# Patient Record
Sex: MALE | Race: WHITE | NOT HISPANIC OR LATINO | Employment: OTHER | URBAN - METROPOLITAN AREA
[De-identification: names, ages, dates, MRNs, and addresses within clinical notes are randomized per-mention and may not be internally consistent; named-entity substitution may affect disease eponyms.]

---

## 2017-02-08 ENCOUNTER — COMMUNICATION - HEALTHEAST (OUTPATIENT)
Dept: FAMILY MEDICINE | Facility: CLINIC | Age: 67
End: 2017-02-08

## 2017-02-08 DIAGNOSIS — F52.21 ERECTILE DISORDER, GENERALIZED, MILD: ICD-10-CM

## 2017-04-24 ENCOUNTER — AMBULATORY - HEALTHEAST (OUTPATIENT)
Dept: FAMILY MEDICINE | Facility: CLINIC | Age: 67
End: 2017-04-24

## 2017-04-24 ENCOUNTER — AMBULATORY - HEALTHEAST (OUTPATIENT)
Dept: NEUROSURGERY | Facility: CLINIC | Age: 67
End: 2017-04-24

## 2017-04-24 ENCOUNTER — OFFICE VISIT - HEALTHEAST (OUTPATIENT)
Dept: FAMILY MEDICINE | Facility: CLINIC | Age: 67
End: 2017-04-24

## 2017-04-24 DIAGNOSIS — N52.9 ED (ERECTILE DYSFUNCTION): ICD-10-CM

## 2017-04-24 DIAGNOSIS — R73.09 ABNORMAL BLOOD SUGAR: ICD-10-CM

## 2017-04-24 DIAGNOSIS — M54.30 SCIATICA: ICD-10-CM

## 2017-04-24 DIAGNOSIS — N40.0 PROSTATISM: ICD-10-CM

## 2017-04-24 DIAGNOSIS — M54.10 RADICULOPATHY: ICD-10-CM

## 2017-04-24 DIAGNOSIS — Z00.00 ROUTINE GENERAL MEDICAL EXAMINATION AT A HEALTH CARE FACILITY: ICD-10-CM

## 2017-04-24 DIAGNOSIS — E78.00 HYPERCHOLESTEREMIA: ICD-10-CM

## 2017-04-24 DIAGNOSIS — M54.9 BACK PAIN: ICD-10-CM

## 2017-04-24 LAB
ATRIAL RATE - MUSE: 71 BPM
CHOLEST SERPL-MCNC: 157 MG/DL
DIASTOLIC BLOOD PRESSURE - MUSE: NORMAL MMHG
FASTING STATUS PATIENT QL REPORTED: YES
HBA1C MFR BLD: 5.9 % (ref 3.5–6)
HDLC SERPL-MCNC: 53 MG/DL
INTERPRETATION ECG - MUSE: NORMAL
LDLC SERPL CALC-MCNC: 84 MG/DL
P AXIS - MUSE: 16 DEGREES
PR INTERVAL - MUSE: 162 MS
PSA SERPL-MCNC: 1.1 NG/ML (ref 0–4.5)
QRS DURATION - MUSE: 84 MS
QT - MUSE: 376 MS
QTC - MUSE: 408 MS
R AXIS - MUSE: 30 DEGREES
SYSTOLIC BLOOD PRESSURE - MUSE: NORMAL MMHG
T AXIS - MUSE: 0 DEGREES
TRIGL SERPL-MCNC: 98 MG/DL
VENTRICULAR RATE- MUSE: 71 BPM

## 2017-04-24 RX ORDER — CHLORAL HYDRATE 500 MG
2 CAPSULE ORAL DAILY
Status: SHIPPED | COMMUNITY
Start: 2017-04-24 | End: 2024-08-27

## 2017-04-24 ASSESSMENT — MIFFLIN-ST. JEOR: SCORE: 1513.76

## 2017-04-28 ENCOUNTER — HOSPITAL ENCOUNTER (OUTPATIENT)
Dept: RADIOLOGY | Facility: HOSPITAL | Age: 67
Discharge: HOME | End: 2017-04-28
Attending: SURGERY

## 2017-04-28 ENCOUNTER — HOSPITAL ENCOUNTER (OUTPATIENT)
Dept: MRI IMAGING | Facility: HOSPITAL | Age: 67
Discharge: HOME | End: 2017-04-28
Attending: SURGERY

## 2017-04-28 DIAGNOSIS — M54.9 BACK PAIN: ICD-10-CM

## 2017-05-04 ENCOUNTER — AMBULATORY - HEALTHEAST (OUTPATIENT)
Dept: NEUROSURGERY | Facility: CLINIC | Age: 67
End: 2017-05-04

## 2017-05-04 ENCOUNTER — OFFICE VISIT - HEALTHEAST (OUTPATIENT)
Dept: NEUROSURGERY | Facility: CLINIC | Age: 67
End: 2017-05-04

## 2017-05-04 ENCOUNTER — COMMUNICATION - HEALTHEAST (OUTPATIENT)
Dept: NEUROSURGERY | Facility: CLINIC | Age: 67
End: 2017-05-04

## 2017-05-04 DIAGNOSIS — M54.9 BACK PAIN: ICD-10-CM

## 2017-05-04 DIAGNOSIS — E78.00 HIGH CHOLESTEROL: ICD-10-CM

## 2017-05-04 ASSESSMENT — MIFFLIN-ST. JEOR: SCORE: 1477.47

## 2017-05-14 ENCOUNTER — COMMUNICATION - HEALTHEAST (OUTPATIENT)
Dept: FAMILY MEDICINE | Facility: CLINIC | Age: 67
End: 2017-05-14

## 2017-05-14 DIAGNOSIS — E78.00 HYPERCHOLESTEREMIA: ICD-10-CM

## 2017-11-12 ENCOUNTER — COMMUNICATION - HEALTHEAST (OUTPATIENT)
Dept: FAMILY MEDICINE | Facility: CLINIC | Age: 67
End: 2017-11-12

## 2017-11-12 DIAGNOSIS — E78.00 HYPERCHOLESTEREMIA: ICD-10-CM

## 2017-11-16 ENCOUNTER — OFFICE VISIT - HEALTHEAST (OUTPATIENT)
Dept: FAMILY MEDICINE | Facility: CLINIC | Age: 67
End: 2017-11-16

## 2017-11-16 DIAGNOSIS — Z00.00 HEALTH CARE MAINTENANCE: ICD-10-CM

## 2017-11-16 DIAGNOSIS — E78.00 HYPERCHOLESTEREMIA: ICD-10-CM

## 2017-11-16 DIAGNOSIS — Z23 NEED FOR PNEUMOCOCCAL VACCINE: ICD-10-CM

## 2017-11-16 DIAGNOSIS — C43.59 MALIGNANT MELANOMA OF TORSO EXCLUDING BREAST (H): ICD-10-CM

## 2017-11-16 DIAGNOSIS — Z01.818 PREOP EXAMINATION: ICD-10-CM

## 2017-11-16 LAB
ATRIAL RATE - MUSE: 70 BPM
CHOLEST SERPL-MCNC: 157 MG/DL
DIASTOLIC BLOOD PRESSURE - MUSE: NORMAL MMHG
FASTING STATUS PATIENT QL REPORTED: YES
HDLC SERPL-MCNC: 54 MG/DL
INTERPRETATION ECG - MUSE: NORMAL
LDLC SERPL CALC-MCNC: 84 MG/DL
P AXIS - MUSE: 10 DEGREES
PR INTERVAL - MUSE: 156 MS
QRS DURATION - MUSE: 84 MS
QT - MUSE: 374 MS
QTC - MUSE: 403 MS
R AXIS - MUSE: 25 DEGREES
SYSTOLIC BLOOD PRESSURE - MUSE: NORMAL MMHG
T AXIS - MUSE: -5 DEGREES
TRIGL SERPL-MCNC: 95 MG/DL
VENTRICULAR RATE- MUSE: 70 BPM

## 2017-11-16 ASSESSMENT — MIFFLIN-ST. JEOR: SCORE: 1504.69

## 2018-05-23 ENCOUNTER — OFFICE VISIT - HEALTHEAST (OUTPATIENT)
Dept: FAMILY MEDICINE | Facility: CLINIC | Age: 68
End: 2018-05-23

## 2018-05-23 DIAGNOSIS — N40.0 PROSTATISM: ICD-10-CM

## 2018-05-23 DIAGNOSIS — R06.02 SOB (SHORTNESS OF BREATH): ICD-10-CM

## 2018-05-23 DIAGNOSIS — E78.00 HYPERCHOLESTEREMIA: ICD-10-CM

## 2018-05-23 DIAGNOSIS — Z00.00 MEDICARE ANNUAL WELLNESS VISIT, INITIAL: ICD-10-CM

## 2018-05-23 DIAGNOSIS — N52.9 ED (ERECTILE DYSFUNCTION): ICD-10-CM

## 2018-05-23 LAB
ALBUMIN SERPL-MCNC: 4.6 G/DL (ref 3.5–5)
ALBUMIN UR-MCNC: NEGATIVE MG/DL
ALP SERPL-CCNC: 80 U/L (ref 45–120)
ALT SERPL W P-5'-P-CCNC: 26 U/L (ref 0–45)
ANION GAP SERPL CALCULATED.3IONS-SCNC: 11 MMOL/L (ref 5–18)
APPEARANCE UR: CLEAR
AST SERPL W P-5'-P-CCNC: 24 U/L (ref 0–40)
ATRIAL RATE - MUSE: 79 BPM
BASOPHILS # BLD AUTO: 0 THOU/UL (ref 0–0.2)
BASOPHILS NFR BLD AUTO: 1 % (ref 0–2)
BILIRUB SERPL-MCNC: 0.7 MG/DL (ref 0–1)
BILIRUB UR QL STRIP: NEGATIVE
BUN SERPL-MCNC: 22 MG/DL (ref 8–22)
CALCIUM SERPL-MCNC: 10.2 MG/DL (ref 8.5–10.5)
CHLORIDE BLD-SCNC: 105 MMOL/L (ref 98–107)
CHOLEST SERPL-MCNC: 137 MG/DL
CO2 SERPL-SCNC: 25 MMOL/L (ref 22–31)
COLOR UR AUTO: YELLOW
CREAT SERPL-MCNC: 1.23 MG/DL (ref 0.7–1.3)
DIASTOLIC BLOOD PRESSURE - MUSE: NORMAL MMHG
EOSINOPHIL # BLD AUTO: 0.3 THOU/UL (ref 0–0.4)
EOSINOPHIL NFR BLD AUTO: 3 % (ref 0–6)
ERYTHROCYTE [DISTWIDTH] IN BLOOD BY AUTOMATED COUNT: 12.7 % (ref 11–14.5)
FASTING STATUS PATIENT QL REPORTED: YES
GFR SERPL CREATININE-BSD FRML MDRD: 58 ML/MIN/1.73M2
GLUCOSE BLD-MCNC: 103 MG/DL (ref 70–125)
GLUCOSE UR STRIP-MCNC: NEGATIVE MG/DL
HBA1C MFR BLD: 5.6 % (ref 3.5–6)
HCT VFR BLD AUTO: 43.2 % (ref 40–54)
HDLC SERPL-MCNC: 49 MG/DL
HGB BLD-MCNC: 14.7 G/DL (ref 14–18)
HGB UR QL STRIP: NEGATIVE
INTERPRETATION ECG - MUSE: NORMAL
KETONES UR STRIP-MCNC: NEGATIVE MG/DL
LDLC SERPL CALC-MCNC: 75 MG/DL
LEUKOCYTE ESTERASE UR QL STRIP: NEGATIVE
LYMPHOCYTES # BLD AUTO: 1.6 THOU/UL (ref 0.8–4.4)
LYMPHOCYTES NFR BLD AUTO: 22 % (ref 20–40)
MCH RBC QN AUTO: 32.1 PG (ref 27–34)
MCHC RBC AUTO-ENTMCNC: 33.9 G/DL (ref 32–36)
MCV RBC AUTO: 95 FL (ref 80–100)
MONOCYTES # BLD AUTO: 0.7 THOU/UL (ref 0–0.9)
MONOCYTES NFR BLD AUTO: 9 % (ref 2–10)
NEUTROPHILS # BLD AUTO: 4.8 THOU/UL (ref 2–7.7)
NEUTROPHILS NFR BLD AUTO: 65 % (ref 50–70)
NITRATE UR QL: NEGATIVE
P AXIS - MUSE: 11 DEGREES
PH UR STRIP: 5.5 [PH] (ref 5–8)
PLATELET # BLD AUTO: 203 THOU/UL (ref 140–440)
PMV BLD AUTO: 9.5 FL (ref 7–10)
POTASSIUM BLD-SCNC: 4.4 MMOL/L (ref 3.5–5)
PR INTERVAL - MUSE: 150 MS
PROT SERPL-MCNC: 7.3 G/DL (ref 6–8)
PSA SERPL-MCNC: 1.4 NG/ML (ref 0–4.5)
QRS DURATION - MUSE: 86 MS
QT - MUSE: 382 MS
QTC - MUSE: 438 MS
R AXIS - MUSE: 48 DEGREES
RBC # BLD AUTO: 4.56 MILL/UL (ref 4.4–6.2)
SODIUM SERPL-SCNC: 141 MMOL/L (ref 136–145)
SP GR UR STRIP: 1.02 (ref 1–1.03)
SYSTOLIC BLOOD PRESSURE - MUSE: NORMAL MMHG
T AXIS - MUSE: 9 DEGREES
TRIGL SERPL-MCNC: 64 MG/DL
UROBILINOGEN UR STRIP-ACNC: NORMAL
VENTRICULAR RATE- MUSE: 79 BPM
WBC: 7.3 THOU/UL (ref 4–11)

## 2018-05-23 ASSESSMENT — MIFFLIN-ST. JEOR: SCORE: 1503.56

## 2018-05-24 ENCOUNTER — COMMUNICATION - HEALTHEAST (OUTPATIENT)
Dept: FAMILY MEDICINE | Facility: CLINIC | Age: 68
End: 2018-05-24

## 2018-05-31 ENCOUNTER — HOSPITAL ENCOUNTER (OUTPATIENT)
Dept: CARDIOLOGY | Facility: HOSPITAL | Age: 68
Discharge: HOME | End: 2018-05-31
Attending: FAMILY MEDICINE

## 2018-05-31 DIAGNOSIS — R06.02 SOB (SHORTNESS OF BREATH): ICD-10-CM

## 2018-05-31 LAB
CV STRESS CURRENT BP HE: NORMAL
CV STRESS CURRENT HR HE: 101
CV STRESS CURRENT HR HE: 104
CV STRESS CURRENT HR HE: 106
CV STRESS CURRENT HR HE: 108
CV STRESS CURRENT HR HE: 109
CV STRESS CURRENT HR HE: 110
CV STRESS CURRENT HR HE: 110
CV STRESS CURRENT HR HE: 116
CV STRESS CURRENT HR HE: 122
CV STRESS CURRENT HR HE: 124
CV STRESS CURRENT HR HE: 128
CV STRESS CURRENT HR HE: 130
CV STRESS CURRENT HR HE: 130
CV STRESS CURRENT HR HE: 132
CV STRESS CURRENT HR HE: 142
CV STRESS CURRENT HR HE: 142
CV STRESS CURRENT HR HE: 143
CV STRESS CURRENT HR HE: 150
CV STRESS CURRENT HR HE: 151
CV STRESS CURRENT HR HE: 151
CV STRESS CURRENT HR HE: 152
CV STRESS CURRENT HR HE: 79
CV STRESS CURRENT HR HE: 81
CV STRESS CURRENT HR HE: 93
CV STRESS CURRENT HR HE: 94
CV STRESS CURRENT HR HE: 95
CV STRESS CURRENT HR HE: 96
CV STRESS CURRENT HR HE: 97
CV STRESS CURRENT HR HE: 97
CV STRESS CURRENT HR HE: 98
CV STRESS CURRENT HR HE: 98
CV STRESS CURRENT HR HE: 99
CV STRESS DEVIATION TIME HE: NORMAL
CV STRESS ECHO PERCENT HR HE: NORMAL
CV STRESS EXERCISE STAGE HE: NORMAL
CV STRESS FINAL RESTING BP HE: NORMAL
CV STRESS FINAL RESTING HR HE: 101
CV STRESS MAX HR HE: 152
CV STRESS MAX TREADMILL GRADE HE: 16
CV STRESS MAX TREADMILL SPEED HE: 4.2
CV STRESS PEAK DIA BP HE: NORMAL
CV STRESS PEAK SYS BP HE: NORMAL
CV STRESS PHASE HE: NORMAL
CV STRESS PROTOCOL HE: NORMAL
CV STRESS RESTING PT POSITION HE: NORMAL
CV STRESS RESTING PT POSITION HE: NORMAL
CV STRESS ST DEVIATION AMOUNT HE: NORMAL
CV STRESS ST DEVIATION ELEVATION HE: NORMAL
CV STRESS ST EVELATION AMOUNT HE: NORMAL
CV STRESS TEST TYPE HE: NORMAL
CV STRESS TOTAL STAGE TIME MIN 1 HE: NORMAL
ECHO EJECTION FRACTION ESTIMATED: 60 %
STRESS ECHO BASELINE BP: NORMAL
STRESS ECHO BASELINE HR: 77
STRESS ECHO CALCULATED PERCENT HR: 101 %
STRESS ECHO LAST STRESS BP: NORMAL
STRESS ECHO LAST STRESS HR: 152
STRESS ECHO POST ESTIMATED WORKLOAD: 12.1
STRESS ECHO POST EXERCISE DUR MIN: 11
STRESS ECHO POST EXERCISE DUR SEC: 20
STRESS ECHO TARGET HR: 128

## 2018-06-04 ENCOUNTER — COMMUNICATION - HEALTHEAST (OUTPATIENT)
Dept: FAMILY MEDICINE | Facility: CLINIC | Age: 68
End: 2018-06-04

## 2018-06-04 ENCOUNTER — OFFICE VISIT - HEALTHEAST (OUTPATIENT)
Dept: FAMILY MEDICINE | Facility: CLINIC | Age: 68
End: 2018-06-04

## 2018-06-04 DIAGNOSIS — R07.89 ATYPICAL CHEST PAIN: ICD-10-CM

## 2018-06-04 ASSESSMENT — MIFFLIN-ST. JEOR: SCORE: 1492.67

## 2018-07-11 ENCOUNTER — OFFICE VISIT - HEALTHEAST (OUTPATIENT)
Dept: FAMILY MEDICINE | Facility: CLINIC | Age: 68
End: 2018-07-11

## 2018-07-11 DIAGNOSIS — F52.21 ERECTILE DISORDER, GENERALIZED, MILD: ICD-10-CM

## 2018-07-11 DIAGNOSIS — E78.00 HYPERCHOLESTEREMIA: ICD-10-CM

## 2018-07-11 DIAGNOSIS — I25.10 CAD (CORONARY ARTERY DISEASE): ICD-10-CM

## 2018-07-11 ASSESSMENT — MIFFLIN-ST. JEOR: SCORE: 1500.38

## 2019-01-28 ENCOUNTER — COMMUNICATION - HEALTHEAST (OUTPATIENT)
Dept: FAMILY MEDICINE | Facility: CLINIC | Age: 69
End: 2019-01-28

## 2019-01-28 DIAGNOSIS — F52.21 ERECTILE DISORDER, GENERALIZED, MILD: ICD-10-CM

## 2019-05-13 ENCOUNTER — COMMUNICATION - HEALTHEAST (OUTPATIENT)
Dept: FAMILY MEDICINE | Facility: CLINIC | Age: 69
End: 2019-05-13

## 2019-05-13 DIAGNOSIS — E78.00 HYPERCHOLESTEREMIA: ICD-10-CM

## 2019-05-13 RX ORDER — UBIDECARENONE 200 MG
200 CAPSULE ORAL DAILY
Qty: 90 CAPSULE | Refills: 3 | Status: SHIPPED | OUTPATIENT
Start: 2019-05-13

## 2019-05-22 ENCOUNTER — COMMUNICATION - HEALTHEAST (OUTPATIENT)
Dept: FAMILY MEDICINE | Facility: CLINIC | Age: 69
End: 2019-05-22

## 2019-05-22 DIAGNOSIS — F52.21 ERECTILE DISORDER, GENERALIZED, MILD: ICD-10-CM

## 2019-07-02 ENCOUNTER — RECORDS - HEALTHEAST (OUTPATIENT)
Dept: ADMINISTRATIVE | Facility: OTHER | Age: 69
End: 2019-07-02

## 2019-08-28 ENCOUNTER — COMMUNICATION - HEALTHEAST (OUTPATIENT)
Dept: FAMILY MEDICINE | Facility: CLINIC | Age: 69
End: 2019-08-28

## 2019-08-28 DIAGNOSIS — F52.21 ERECTILE DISORDER, GENERALIZED, MILD: ICD-10-CM

## 2019-08-29 ENCOUNTER — COMMUNICATION - HEALTHEAST (OUTPATIENT)
Dept: FAMILY MEDICINE | Facility: CLINIC | Age: 69
End: 2019-08-29

## 2019-08-29 DIAGNOSIS — F52.21 ERECTILE DISORDER, GENERALIZED, MILD: ICD-10-CM

## 2019-09-25 ENCOUNTER — OFFICE VISIT - HEALTHEAST (OUTPATIENT)
Dept: FAMILY MEDICINE | Facility: CLINIC | Age: 69
End: 2019-09-25

## 2019-09-25 DIAGNOSIS — N40.0 PROSTATISM: ICD-10-CM

## 2019-09-25 DIAGNOSIS — Z12.5 ENCOUNTER FOR SCREENING FOR MALIGNANT NEOPLASM OF PROSTATE: ICD-10-CM

## 2019-09-25 DIAGNOSIS — C43.59 MALIGNANT MELANOMA OF TORSO EXCLUDING BREAST (H): ICD-10-CM

## 2019-09-25 DIAGNOSIS — E78.00 HYPERCHOLESTEREMIA: ICD-10-CM

## 2019-09-25 DIAGNOSIS — R73.01 IMPAIRED FASTING BLOOD SUGAR: ICD-10-CM

## 2019-09-25 DIAGNOSIS — Z00.00 MEDICARE ANNUAL WELLNESS VISIT, SUBSEQUENT: ICD-10-CM

## 2019-09-25 LAB
ALBUMIN SERPL-MCNC: 4.6 G/DL (ref 3.5–5)
ALBUMIN UR-MCNC: NEGATIVE MG/DL
ALP SERPL-CCNC: 78 U/L (ref 45–120)
ALT SERPL W P-5'-P-CCNC: 30 U/L (ref 0–45)
ANION GAP SERPL CALCULATED.3IONS-SCNC: 9 MMOL/L (ref 5–18)
APPEARANCE UR: CLEAR
AST SERPL W P-5'-P-CCNC: 25 U/L (ref 0–40)
BILIRUB SERPL-MCNC: 0.8 MG/DL (ref 0–1)
BILIRUB UR QL STRIP: NEGATIVE
BUN SERPL-MCNC: 19 MG/DL (ref 8–28)
CALCIUM SERPL-MCNC: 9.8 MG/DL (ref 8.5–10.5)
CHLORIDE BLD-SCNC: 104 MMOL/L (ref 98–107)
CHOLEST SERPL-MCNC: 151 MG/DL
CO2 SERPL-SCNC: 28 MMOL/L (ref 22–31)
COLOR UR AUTO: YELLOW
CREAT SERPL-MCNC: 1.24 MG/DL (ref 0.7–1.3)
ERYTHROCYTE [DISTWIDTH] IN BLOOD BY AUTOMATED COUNT: 12.8 % (ref 11–14.5)
FASTING STATUS PATIENT QL REPORTED: YES
GFR SERPL CREATININE-BSD FRML MDRD: 58 ML/MIN/1.73M2
GLUCOSE BLD-MCNC: 93 MG/DL (ref 70–125)
GLUCOSE UR STRIP-MCNC: NEGATIVE MG/DL
HBA1C MFR BLD: 5.8 % (ref 3.5–6)
HCT VFR BLD AUTO: 42.2 % (ref 40–54)
HDLC SERPL-MCNC: 58 MG/DL
HGB BLD-MCNC: 14.4 G/DL (ref 14–18)
HGB UR QL STRIP: NEGATIVE
KETONES UR STRIP-MCNC: NEGATIVE MG/DL
LDLC SERPL CALC-MCNC: 76 MG/DL
LEUKOCYTE ESTERASE UR QL STRIP: NEGATIVE
MCH RBC QN AUTO: 31.7 PG (ref 27–34)
MCHC RBC AUTO-ENTMCNC: 34.2 G/DL (ref 32–36)
MCV RBC AUTO: 93 FL (ref 80–100)
NITRATE UR QL: NEGATIVE
PH UR STRIP: 5.5 [PH] (ref 5–8)
PLATELET # BLD AUTO: 174 THOU/UL (ref 140–440)
PMV BLD AUTO: 9.5 FL (ref 7–10)
POTASSIUM BLD-SCNC: 4.1 MMOL/L (ref 3.5–5)
PROT SERPL-MCNC: 7.3 G/DL (ref 6–8)
PSA SERPL-MCNC: 0.8 NG/ML (ref 0–6.5)
RBC # BLD AUTO: 4.55 MILL/UL (ref 4.4–6.2)
SODIUM SERPL-SCNC: 141 MMOL/L (ref 136–145)
SP GR UR STRIP: 1.02 (ref 1–1.03)
TRIGL SERPL-MCNC: 86 MG/DL
UROBILINOGEN UR STRIP-ACNC: NORMAL
WBC: 6.9 THOU/UL (ref 4–11)

## 2019-09-25 ASSESSMENT — MIFFLIN-ST. JEOR: SCORE: 1457.75

## 2019-09-25 ASSESSMENT — ANXIETY QUESTIONNAIRES
2. NOT BEING ABLE TO STOP OR CONTROL WORRYING: NOT AT ALL
1. FEELING NERVOUS, ANXIOUS, OR ON EDGE: NOT AT ALL

## 2019-09-26 ENCOUNTER — COMMUNICATION - HEALTHEAST (OUTPATIENT)
Dept: FAMILY MEDICINE | Facility: CLINIC | Age: 69
End: 2019-09-26

## 2019-09-26 LAB
ATRIAL RATE - MUSE: 69 BPM
DIASTOLIC BLOOD PRESSURE - MUSE: NORMAL
INTERPRETATION ECG - MUSE: NORMAL
P AXIS - MUSE: 17 DEGREES
PR INTERVAL - MUSE: 160 MS
QRS DURATION - MUSE: 82 MS
QT - MUSE: 388 MS
QTC - MUSE: 415 MS
R AXIS - MUSE: 37 DEGREES
SYSTOLIC BLOOD PRESSURE - MUSE: NORMAL
T AXIS - MUSE: 7 DEGREES
VENTRICULAR RATE- MUSE: 69 BPM

## 2019-12-01 ENCOUNTER — COMMUNICATION - HEALTHEAST (OUTPATIENT)
Dept: FAMILY MEDICINE | Facility: CLINIC | Age: 69
End: 2019-12-01

## 2019-12-01 DIAGNOSIS — F52.21 ERECTILE DISORDER, GENERALIZED, MILD: ICD-10-CM

## 2019-12-04 ENCOUNTER — COMMUNICATION - HEALTHEAST (OUTPATIENT)
Dept: FAMILY MEDICINE | Facility: CLINIC | Age: 69
End: 2019-12-04

## 2020-02-20 ENCOUNTER — COMMUNICATION - HEALTHEAST (OUTPATIENT)
Dept: FAMILY MEDICINE | Facility: CLINIC | Age: 70
End: 2020-02-20

## 2020-02-20 DIAGNOSIS — R07.89 ATYPICAL CHEST PAIN: ICD-10-CM

## 2020-10-23 ENCOUNTER — COMMUNICATION - HEALTHEAST (OUTPATIENT)
Dept: LAB | Facility: CLINIC | Age: 70
End: 2020-10-23

## 2020-10-26 ENCOUNTER — AMBULATORY - HEALTHEAST (OUTPATIENT)
Dept: LAB | Facility: CLINIC | Age: 70
End: 2020-10-26

## 2020-10-26 ENCOUNTER — AMBULATORY - HEALTHEAST (OUTPATIENT)
Dept: FAMILY MEDICINE | Facility: CLINIC | Age: 70
End: 2020-10-26

## 2020-10-26 DIAGNOSIS — R73.01 IMPAIRED FASTING GLUCOSE: ICD-10-CM

## 2020-10-26 DIAGNOSIS — Z00.00 ROUTINE GENERAL MEDICAL EXAMINATION AT A HEALTH CARE FACILITY: ICD-10-CM

## 2020-10-26 DIAGNOSIS — Z12.5 ENCOUNTER FOR SCREENING FOR MALIGNANT NEOPLASM OF PROSTATE: ICD-10-CM

## 2020-10-26 DIAGNOSIS — E78.00 HYPERCHOLESTEREMIA: ICD-10-CM

## 2020-10-26 LAB
ALBUMIN SERPL-MCNC: 4.6 G/DL (ref 3.5–5)
ALBUMIN UR-MCNC: NEGATIVE MG/DL
ALP SERPL-CCNC: 86 U/L (ref 45–120)
ALT SERPL W P-5'-P-CCNC: 30 U/L (ref 0–45)
ANION GAP SERPL CALCULATED.3IONS-SCNC: 10 MMOL/L (ref 5–18)
APPEARANCE UR: CLEAR
AST SERPL W P-5'-P-CCNC: 24 U/L (ref 0–40)
BACTERIA #/AREA URNS HPF: NORMAL HPF
BILIRUB SERPL-MCNC: 0.6 MG/DL (ref 0–1)
BILIRUB UR QL STRIP: NEGATIVE
BUN SERPL-MCNC: 24 MG/DL (ref 8–28)
CALCIUM SERPL-MCNC: 9.9 MG/DL (ref 8.5–10.5)
CHLORIDE BLD-SCNC: 106 MMOL/L (ref 98–107)
CHOLEST SERPL-MCNC: 161 MG/DL
CO2 SERPL-SCNC: 26 MMOL/L (ref 22–31)
COLOR UR AUTO: YELLOW
CREAT SERPL-MCNC: 1.13 MG/DL (ref 0.7–1.3)
ERYTHROCYTE [DISTWIDTH] IN BLOOD BY AUTOMATED COUNT: 14.1 % (ref 11–14.5)
FASTING STATUS PATIENT QL REPORTED: YES
GFR SERPL CREATININE-BSD FRML MDRD: >60 ML/MIN/1.73M2
GLUCOSE BLD-MCNC: 112 MG/DL (ref 70–125)
GLUCOSE UR STRIP-MCNC: NEGATIVE MG/DL
HBA1C MFR BLD: 5.9 %
HCT VFR BLD AUTO: 43.2 % (ref 40–54)
HDLC SERPL-MCNC: 57 MG/DL
HGB BLD-MCNC: 14.1 G/DL (ref 14–18)
HGB UR QL STRIP: NEGATIVE
KETONES UR STRIP-MCNC: NEGATIVE MG/DL
LDLC SERPL CALC-MCNC: 87 MG/DL
LEUKOCYTE ESTERASE UR QL STRIP: NEGATIVE
MCH RBC QN AUTO: 31.3 PG (ref 27–34)
MCHC RBC AUTO-ENTMCNC: 32.6 G/DL (ref 32–36)
MCV RBC AUTO: 96 FL (ref 80–100)
NITRATE UR QL: NEGATIVE
PH UR STRIP: 5.5 [PH] (ref 5–8)
PLATELET # BLD AUTO: 161 THOU/UL (ref 140–440)
PMV BLD AUTO: 12.8 FL (ref 8.5–12.5)
POTASSIUM BLD-SCNC: 4.9 MMOL/L (ref 3.5–5)
PROT SERPL-MCNC: 7.1 G/DL (ref 6–8)
PSA SERPL-MCNC: 1 NG/ML (ref 0–6.5)
RBC # BLD AUTO: 4.5 MILL/UL (ref 4.4–6.2)
RBC #/AREA URNS AUTO: NORMAL HPF
SODIUM SERPL-SCNC: 142 MMOL/L (ref 136–145)
SP GR UR STRIP: >=1.03 (ref 1–1.03)
SQUAMOUS #/AREA URNS AUTO: NORMAL LPF
TRIGL SERPL-MCNC: 83 MG/DL
UROBILINOGEN UR STRIP-ACNC: NORMAL
WBC #/AREA URNS AUTO: NORMAL HPF
WBC: 7.4 THOU/UL (ref 4–11)

## 2020-11-02 ENCOUNTER — OFFICE VISIT - HEALTHEAST (OUTPATIENT)
Dept: FAMILY MEDICINE | Facility: CLINIC | Age: 70
End: 2020-11-02

## 2020-11-02 DIAGNOSIS — Z00.00 ROUTINE GENERAL MEDICAL EXAMINATION AT A HEALTH CARE FACILITY: ICD-10-CM

## 2020-11-02 DIAGNOSIS — E78.00 HYPERCHOLESTEREMIA: ICD-10-CM

## 2020-11-02 DIAGNOSIS — F52.21 ERECTILE DISORDER, GENERALIZED, MILD: ICD-10-CM

## 2020-11-02 LAB
ATRIAL RATE - MUSE: 67 BPM
DIASTOLIC BLOOD PRESSURE - MUSE: NORMAL
INTERPRETATION ECG - MUSE: NORMAL
P AXIS - MUSE: 23 DEGREES
PR INTERVAL - MUSE: 164 MS
QRS DURATION - MUSE: 84 MS
QT - MUSE: 404 MS
QTC - MUSE: 426 MS
R AXIS - MUSE: 41 DEGREES
SYSTOLIC BLOOD PRESSURE - MUSE: NORMAL
T AXIS - MUSE: 10 DEGREES
VENTRICULAR RATE- MUSE: 67 BPM

## 2020-11-02 RX ORDER — SILDENAFIL 50 MG/1
TABLET, FILM COATED ORAL
Qty: 18 TABLET | Refills: 3 | Status: SHIPPED | OUTPATIENT
Start: 2020-11-02 | End: 2021-11-17

## 2020-11-02 RX ORDER — SIMVASTATIN 80 MG
TABLET ORAL
Qty: 90 TABLET | Refills: 3 | Status: SHIPPED | OUTPATIENT
Start: 2020-11-02 | End: 2021-11-17

## 2020-11-02 ASSESSMENT — MIFFLIN-ST. JEOR: SCORE: 1475.5

## 2020-12-03 ENCOUNTER — COMMUNICATION - HEALTHEAST (OUTPATIENT)
Dept: PHARMACY | Facility: CLINIC | Age: 70
End: 2020-12-03

## 2020-12-03 DIAGNOSIS — R07.89 ATYPICAL CHEST PAIN: ICD-10-CM

## 2020-12-20 ENCOUNTER — COMMUNICATION - HEALTHEAST (OUTPATIENT)
Dept: FAMILY MEDICINE | Facility: CLINIC | Age: 70
End: 2020-12-20

## 2020-12-20 DIAGNOSIS — E78.00 HYPERCHOLESTEREMIA: ICD-10-CM

## 2020-12-20 DIAGNOSIS — F52.21 ERECTILE DISORDER, GENERALIZED, MILD: ICD-10-CM

## 2020-12-20 DIAGNOSIS — Z00.00 ROUTINE GENERAL MEDICAL EXAMINATION AT A HEALTH CARE FACILITY: ICD-10-CM

## 2021-05-25 ENCOUNTER — RECORDS - HEALTHEAST (OUTPATIENT)
Dept: ADMINISTRATIVE | Facility: CLINIC | Age: 71
End: 2021-05-25

## 2021-05-29 NOTE — TELEPHONE ENCOUNTER
Refill Approved    Rx renewed per Medication Renewal Policy. Medication was last renewed on 7/11/18.    Scarlett De La Fuente, [facility] Triage/Med Refill 5/23/2019     Requested Prescriptions   Pending Prescriptions Disp Refills     simvastatin (ZOCOR) 80 MG tablet [Pharmacy Med Name: SIMVASTATIN 80MG TABLETS] 90 tablet 0     Sig: TAKE 1 TABLET(80 MG) BY MOUTH EVERY EVENING       Statins Refill Protocol (Hmg CoA Reductase Inhibitors) Passed - 5/22/2019  1:46 PM        Passed - PCP or prescribing provider visit in past 12 months      Last office visit with prescriber/PCP: 7/11/2018 Luis Alfredo Bolanos MD OR same dept: 7/11/2018 Luis Alfredo Bolanos MD OR same specialty: 7/11/2018 Luis Alfredo Bolanos MD  Last physical: 5/23/2018 Last MTM visit: Visit date not found   Next visit within 3 mo: Visit date not found  Next physical within 3 mo: Visit date not found  Prescriber OR PCP: Luis Alfredo Bolanos MD  Last diagnosis associated with med order: 1. Erectile disorder, generalized, mild  - simvastatin (ZOCOR) 80 MG tablet [Pharmacy Med Name: SIMVASTATIN 80MG TABLETS]; TAKE 1 TABLET(80 MG) BY MOUTH EVERY EVENING  Dispense: 90 tablet; Refill: 0    If protocol passes may refill for 12 months if within 3 months of last provider visit (or a total of 15 months).

## 2021-05-30 VITALS — BODY MASS INDEX: 27.8 KG/M2 | WEIGHT: 173 LBS | HEIGHT: 66 IN

## 2021-05-30 VITALS — WEIGHT: 181 LBS | BODY MASS INDEX: 29.09 KG/M2 | HEIGHT: 66 IN

## 2021-05-31 VITALS — WEIGHT: 179 LBS | HEIGHT: 66 IN | BODY MASS INDEX: 28.77 KG/M2

## 2021-05-31 NOTE — TELEPHONE ENCOUNTER
RN cannot approve Refill Request    RN can NOT refill this medication Protocol failed and NO refill given.       Scarlett De La Fuente, [facility] Triage/Med Refill 8/29/2019    Requested Prescriptions   Pending Prescriptions Disp Refills     simvastatin (ZOCOR) 80 MG tablet [Pharmacy Med Name: SIMVASTATIN 80MG TABLETS] 90 tablet 0     Sig: TAKE 1 TABLET(80 MG) BY MOUTH EVERY EVENING       Statins Refill Protocol (Hmg CoA Reductase Inhibitors) Failed - 8/28/2019  8:52 AM        Failed - PCP or prescribing provider visit in past 12 months      Last office visit with prescriber/PCP: 7/11/2018 Luis Alfredo Bolanos MD OR same dept: Visit date not found OR same specialty: 7/11/2018 Luis Alfredo Bolanos MD  Last physical: 5/23/2018 Last MTM visit: Visit date not found   Next visit within 3 mo: Visit date not found  Next physical within 3 mo: Visit date not found  Prescriber OR PCP: Luis Alfredo Bolanos MD  Last diagnosis associated with med order: 1. Erectile disorder, generalized, mild  - simvastatin (ZOCOR) 80 MG tablet [Pharmacy Med Name: SIMVASTATIN 80MG TABLETS]; TAKE 1 TABLET(80 MG) BY MOUTH EVERY EVENING  Dispense: 90 tablet; Refill: 0    If protocol passes may refill for 12 months if within 3 months of last provider visit (or a total of 15 months).

## 2021-06-01 VITALS — HEIGHT: 67 IN | WEIGHT: 177 LBS | BODY MASS INDEX: 27.78 KG/M2

## 2021-06-01 VITALS — WEIGHT: 178.1 LBS | BODY MASS INDEX: 28.62 KG/M2 | HEIGHT: 66 IN

## 2021-06-01 VITALS — WEIGHT: 179.8 LBS | BODY MASS INDEX: 28.9 KG/M2 | HEIGHT: 66 IN

## 2021-06-01 NOTE — PROGRESS NOTES
Assessment and Plan:   Royce is being seen for his annual examination wellness examination and follow-up of his high lipids and his strong family history of coronary artery disease.  Patient in the past has had atypical chest discomfort has been evaluated and worked up both here locally and as well as at [facility].  CT angiogram years ago showed some mild blockage 1 of the LAD tributaries minimally involvement.  Patient is on simvastatin 80 mg last lipid level showed LDLs of 70.  Patient maintains his weight is physically active and enjoys his family lives in the area here and [place] in the [place] area he denies any recent visual disturbances he does have mild presbycusis wears hearing aids no dysphasia difficulty swallowing no difficulty with muscles of mastication no shortness of breath dyspnea chest pain angina no abdominal discomfort colonoscopy up-to-date with nocturia x1 usually related to if he has a beer or not at night.  Alcohol social use about 4 beers per week.  No neurological complaints no disturbance of gait or station.  Medications were reviewed today patient does take baby aspirin in addition to his lipids.  We did suggest that he control his sodium intake as he freely uses sodium.  His wife does have hypertension and she watches her sodium and I suggest that he should do that to for a long-term benefits.  All medical questions asked were answered I personally reviewed family social history surgeries allergies problems list    1. Medicare annual wellness visit, subsequent    - Comprehensive Metabolic Panel  - HM2(CBC w/o Differential)  - Urinalysis-UC if Indicated    2. Hypercholesteremia    - Glycosylated Hemoglobin A1c  - Lipid Cascade  - Electrocardiogram Perform - Clinic    3. Prostatism    - PSA (Prostatic-Specific Antigen), Annual Screen    4. Malignant melanoma of torso excluding breast (H)  Follow-up with biannual. Impaired fasting blood sugar   Glycosylated Hemoglobin A1c    6.  Encounter for screening for malignant neoplasm of prostate   2 twice a day to every 12 hours and tramadol- PSA (Prostatic-Specific Antigen), Annual Screen     The patient's current medical problems were reviewed.    I have had an Advance Directives discussion with the patient.  The following health maintenance schedule was reviewed with the patient and provided in printed form in the after visit summary:   Health Maintenance   Topic Date Due     HEPATITIS C SCREENING  1950     TD 18+ HE  12/05/1966     PNEUMOCOCCAL IMMUNIZATION 65+ LOW/MEDIUM RISK (1 of 2 - PCV13) 12/05/2013     MEDICARE ANNUAL WELLNESS VISIT  11/25/2016     INFLUENZA VACCINE RULE BASED (1) 08/01/2019     ZOSTER VACCINES (1 of 2) 09/26/2018     FALL RISK ASSESSMENT  09/25/2020     ADVANCE CARE PLANNING  07/11/2023     COLONOSCOPY  12/17/2025        Subjective:   Chief Complaint: Royce Feliz is an 68 y.o. male here for an Annual Wellness visit.   HPI: See under assessment and plan    Review of Systems: 14 point review of systems negative please see above.  The rest of the review of systems are negative for all systems.    Patient Care Team:  Luis Alfredo Bolanos MD as PCP - General (Family Medicine)  Luis Alfredo Bolanos MD as Assigned PCP     Patient Active Problem List   Diagnosis     Hypercholesteremia     Prostatism     High cholesterol     Malignant melanoma of torso excluding breast (H)     Past Medical History:   Diagnosis Date     Cancer (H)     melanoma     High cholesterol       Past Surgical History:   Procedure Laterality Date     EYE SURGERY       SKIN BIOPSY        Family History   Problem Relation Age of Onset     Cancer Mother      Diabetes Father      Cancer Sister      Varicose Veins Sister      Diabetes Brother       Social History     Socioeconomic History     Marital status:      Spouse name: Not on file     Number of children: Not on file     Years of education: Not on file     Highest education level: Not on file  "  Occupational History     Not on file   Social Needs     Financial resource strain: Not on file     Food insecurity:     Worry: Not on file     Inability: Not on file     Transportation needs:     Medical: Not on file     Non-medical: Not on file   Tobacco Use     Smoking status: Never Smoker     Smokeless tobacco: Never Used   Substance and Sexual Activity     Alcohol use: Not on file     Drug use: Not on file     Sexual activity: Not on file   Lifestyle     Physical activity:     Days per week: Not on file     Minutes per session: Not on file     Stress: Not on file   Relationships     Social connections:     Talks on phone: Not on file     Gets together: Not on file     Attends Faith service: Not on file     Active member of club or organization: Not on file     Attends meetings of clubs or organizations: Not on file     Relationship status: Not on file     Intimate partner violence:     Fear of current or ex partner: Not on file     Emotionally abused: Not on file     Physically abused: Not on file     Forced sexual activity: Not on file   Other Topics Concern     Not on file   Social History Narrative     Not on file      Current Outpatient Medications   Medication Sig Dispense Refill     aspirin 81 MG EC tablet Take 1 tablet by mouth.       coenzyme Q10 200 mg capsule Take 200 mg by mouth daily. 90 capsule 3     MEN'S MULTI-VITAMIN ORAL Take 1 tablet by mouth daily.       OMEGA-3/DHA/EPA/FISH OIL (FISH OIL-OMEGA-3 FATTY ACIDS) 300-1,000 mg capsule Take 2 g by mouth daily.       omeprazole (PRILOSEC) 20 MG capsule Take 1 capsule (20 mg total) by mouth daily. 90 capsule 3     sildenafil (VIAGRA) 50 MG tablet TAKE 1 TABLET BY MOUTH 1 HOUR BEFORE NEEDED 18 tablet 3     simvastatin (ZOCOR) 80 MG tablet TAKE 1 TABLET(80 MG) BY MOUTH EVERY EVENING 90 tablet 0     No current facility-administered medications for this visit.       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, EOM's intact, conjunctiva and corneas clear  Nose:  Nares symmetrical, septum midline, mucosa pink, no sinus tenderness  Throat:  Lips, tongue, and mucosa are moist, pink, and intact  Neck:  Supple, symmetrical, trachea midline, no adenopathy; thyroid: no enlargement, symmetric,no tenderness/mass/nodules; no carotid bruit, no JVD  Back:  Symmetrical, no curvature, ROM normal, no CVA tenderness  Chest/Breast:  No mass or tenderness  Lungs:  Clear to auscultation bilaterally, respirations unlabored   Heart:  Normal PMI, regular rate & rhythm, S1 and S2 normal, no murmurs, rubs, or gallops  Abdomen:  Soft, non-tender, bowel sounds active all four quadrants, no mass, or organomegaly  Musculoskeletal:  Tone and strength strong and symmetrical, all extremities  Lymphatic:  No adenopathy  Skin/Hair/Nails:  Skin warm, dry, and intact, no rashes  Neurologic:  Alert and oriented x3, no cranial nerve deficits, normal strength and tone, gait steady  Extremities:  No edema.  Yaz's sign negative.    Genitourinary: Circumcised male testes normal prostate 40  Pulses:  Equal bilaterally    Assessment Results 9/25/2019   Activities of Daily Living No help needed   Instrumental Activities of Daily Living No help needed   Mini Cog Total Score 4   Some recent data might be hidden     A Mini-Cog score of 0-2 suggests the possibility of dementia, score of 3-5 suggests no dementia    Identified Health Risks:     The patient was counseled and encouraged to consider modifying their diet and eating habits. He was provided with information on recommended healthy diet options.  Patient's advanced directive was discussed  and I am comfortable with the patient's wishes.

## 2021-06-03 VITALS
WEIGHT: 173.9 LBS | SYSTOLIC BLOOD PRESSURE: 110 MMHG | HEART RATE: 72 BPM | HEIGHT: 65 IN | BODY MASS INDEX: 28.97 KG/M2 | DIASTOLIC BLOOD PRESSURE: 70 MMHG

## 2021-06-03 NOTE — TELEPHONE ENCOUNTER
Refill Approved    Rx renewed per Medication Renewal Policy. Medication was last renewed on 8/29/19.    Lalitha Franco, [facility] Triage/Med Refill 12/1/2019     Requested Prescriptions   Pending Prescriptions Disp Refills     simvastatin (ZOCOR) 80 MG tablet [Pharmacy Med Name: SIMVASTATIN 80MG TABLETS] 90 tablet 0     Sig: TAKE 1 TABLET(80 MG) BY MOUTH EVERY EVENING       Statins Refill Protocol (Hmg CoA Reductase Inhibitors) Passed - 12/1/2019  8:45 AM        Passed - PCP or prescribing provider visit in past 12 months      Last office visit with prescriber/PCP: Visit date not found OR same dept: Visit date not found OR same specialty: 7/11/2018 Luis Alfredo Bolanos MD  Last physical: Visit date not found Last MTM visit: Visit date not found   Next visit within 3 mo: Visit date not found  Next physical within 3 mo: Visit date not found  Prescriber OR PCP: Myesha Iqbal CNP  Last diagnosis associated with med order: 1. Erectile disorder, generalized, mild  - simvastatin (ZOCOR) 80 MG tablet [Pharmacy Med Name: SIMVASTATIN 80MG TABLETS]; TAKE 1 TABLET(80 MG) BY MOUTH EVERY EVENING  Dispense: 90 tablet; Refill: 0    If protocol passes may refill for 12 months if within 3 months of last provider visit (or a total of 15 months).

## 2021-06-03 NOTE — TELEPHONE ENCOUNTER
Name of form/paperwork: Other:  Medical Examinor Certificate for patient to be able to drive  Patient has CDL license to drive a truck.  Have you been seen for this request: N/A  Do we have the form: Patient is asking if you have this form at the clinic or on line  When is form needed by: asap. Patient states he has to know by today.  How would you like the form returned: Call patient.  Fax Number: n/a  Patient Notified form requests are processed in 3-5 business days: No  (If patient needs form sooner, please note that in this message.)  Okay to leave a detailed message? Yes

## 2021-06-04 VITALS
SYSTOLIC BLOOD PRESSURE: 116 MMHG | BODY MASS INDEX: 29.36 KG/M2 | WEIGHT: 176.2 LBS | HEIGHT: 65 IN | DIASTOLIC BLOOD PRESSURE: 68 MMHG | OXYGEN SATURATION: 98 % | HEART RATE: 68 BPM

## 2021-06-06 NOTE — TELEPHONE ENCOUNTER
Refill Approved    Rx renewed per Medication Renewal Policy. Medication was last renewed on 1/28/19.    Scarlett De La Fuente, [facility] Triage/Med Refill 2/24/2020     Requested Prescriptions   Pending Prescriptions Disp Refills     omeprazole (PRILOSEC) 20 MG capsule [Pharmacy Med Name: OMEPRAZOLE 20MG CAPSULES] 90 capsule 3     Sig: TAKE 1 CAPSULE(20 MG) BY MOUTH DAILY       GI Medications Refill Protocol Passed - 2/20/2020  6:43 PM        Passed - PCP or prescribing provider visit in last 12 or next 3 months.     Last office visit with prescriber/PCP: 7/11/2018 Luis Alfredo Bolanos MD OR same dept: Visit date not found OR same specialty: 7/11/2018 Luis Alfredo Bolanos MD  Last physical: 9/25/2019 Last MTM visit: Visit date not found   Next visit within 3 mo: Visit date not found  Next physical within 3 mo: Visit date not found  Prescriber OR PCP: Luis Alfredo Bolanos MD  Last diagnosis associated with med order: There are no diagnoses linked to this encounter.  If protocol passes may refill for 12 months if within 3 months of last provider visit (or a total of 15 months).

## 2021-06-10 NOTE — PROGRESS NOTES
This is a level 2 office consult.  Consult was requested by Dr Luis Alfredo Bolanos.  Royce Feliz  is a 66 y.o. male     Chief complaint: Numbness    HPI: The numbness affects both legs, more on the left.  It affects the left butt and leg.  It gets worse with driving or standing.  It has been present for 1 year.  He also describes bilateral leg pain, worse on the left.  He also describes bilateral leg weakness, worse on the left.  He does not have bladder symptoms.  He has not tried conservative treatment.  He does not want surgery.    PMH/ROS: Elevated cholesterol.  Melanoma.  No heart disease, lung disease, diabetes.    Exam: Well-developed and well-nourished.  Gait okay.  Able to walk on tiptoes and heels.  Good range of motion.  Reflexes hypoactive.  Good strength.  No atrophy.  Sensation intact.  Straight leg raising negative.  Raul's sign negative.    Studies/imaging: MRI shows foramen stenosis at L5-S1 bilateral and L4-5 on the right.    Impression: Lumbar spinal stenosis    Plan: Patient does not want surgery.  Neurology visit and EMG.  Trial of conservative treatment.  If we end up doing surgery maybe plain lumbar spine films with oblique views and flexion-extension views and an x-ray of the pelvis with both hips included.  Patient is satisfied with the plan.  Return for follow-up after the foregoing.

## 2021-06-10 NOTE — PROGRESS NOTES
Neurosurgery consultation was requested by: Dr. Luis Alfredo Bolanos   Pain: Denies back pain   Radicular Pain is present: Denies radicular pain   Lhermitte sign: No  Motor complaints: Weakness in both legs, left worse  Sensory complaints: Numbness in buttock and down posterior side of both legs and into feet   Gait and balance issues: Yes  Bowel or bladder issues: Has more constipation   Duration of SX is: 1 year   The symptoms are worse with: Standing and driving   The symptoms are better with: Adjusting positions   Injury: Denies   Severity is: Mild - moderate  Patient has tried the following conservative measures: Pt did PT/ massage and did help for short term . He also saw chiropractor and did not help.   YASMIN score is 26%  CHRIS Meade

## 2021-06-10 NOTE — PROGRESS NOTES
A consult was placed to Dr. Mcdaniel.  The reason for the consult was back pain.   A MRI w/wo of the lumbar spine was requested to assess for abnormality.  Kimberly Samaniego RN, CNRN

## 2021-06-10 NOTE — PROGRESS NOTES
"CC: I am here for my physical.  My lower back is bothering me and getting some numbness in my buttock and on my left leg    HPI: Royce presents here for his annual physical is been under my care for many years his medical problems include hyperlipidemia type II a for which he takes simvastatin 40 mg once daily.  He does have intermittent erectile dysfunction well managed with Viagra.  The patient has had borderline blood sugars in the past fasting blood sugars of 100 205.  His weight has been stable he denies any constitutional complaints no visual problems hearing problems dysphagia shortness of breath dyspnea chest pain angina abdominal pain diarrhea constipation urgency frequency dysuria patient is complaining of some intermittent paresthesias and numbness and burning in his left sciatic nerve distribution he did get some manipulative therapy was in [place] this winter with some good relief patient is requesting evaluation.  We will place referral for neurosurgery.  He has been a little more slightly short of breath than normal I note that his EKG is unchanged here patient is used to walking at least 20-25 minutes per day and he is to continue this.  All medical questions that were asked were answered I personally reviewed family and social history surgeries allergies and problems list at this visit.  Plan is to do a complete usual workup and referral to neurosurgery will follow with the consultants results      Review of Systems: A complete 14 point review of systems was obtained and is negative or as stated in the history of present illness.    No past medical history on file.  No family history on file.  No past surgical history on file.  Social History   Substance Use Topics     Smoking status: Never Smoker     Smokeless tobacco: Never Used     Alcohol use None       PE:   /78  Pulse 71  Ht 5' 6\" (1.676 m)  Wt 181 lb (82.1 kg)  SpO2 96%  BMI 29.21 kg/m2     General Appearance:  Alert, cooperative, " no distress  Head:  Normocephalic, no obvious abnormality  Ears: TM anatomy normal  Eyes:  PERRL, EOM's intact, conjunctiva and corneas clear left eye is prosthetic  Nose:  Nares symmetrical, septum midline, mucosa pink, no sinus tenderness  Throat:  Lips, tongue, and mucosa are moist, pink, and intact  Neck:  Supple, symmetrical, trachea midline, no adenopathy; thyroid: no enlargement, symmetric,no tenderness/mass/nodules; no carotid bruit, no JVD  Back:  Symmetrical, no curvature, ROM normal, no CVA tenderness  Chest/Breast:  No mass or tenderness  Lungs:  Clear to auscultation bilaterally, respirations unlabored   Heart:  Normal PMI, regular rate & rhythm, S1 and S2 normal, no murmurs, rubs, or gallops  Abdomen:  Soft, non-tender, bowel sounds active all four quadrants, no mass, or organomegaly  Musculoskeletal:  Tone and strength strong and symmetrical, all extremities  Lymphatic:  No adenopathy  Skin/Hair/Nails:  Skin warm, dry, and intact, no rashes  Neurologic:  Alert and oriented x3, no cranial nerve deficits, normal strength and tone, gait steady  Extremities:  No edema.  Yaz's sign negative.    Genitourinary circumcised male testes down rectal prostate normal  Pulses:  Equal bilaterally    Impression:     1. Routine general medical examination at a health care facility  Comprehensive Metabolic Panel    Electrocardiogram Perform - Clinic    PSA (Prostatic-Specific Antigen), Annual Screen    Urinalysis-UC if Indicated    Vitamin D, Total (25-Hydroxy)    HM1(CBC and Differential)    HM1 (CBC with Diff)   2. Hypercholesteremia  Comprehensive Metabolic Panel    Lipid Cascade    Thyroid Cascade   3. Prostatism  PSA (Prostatic-Specific Antigen), Annual Screen   4. ED (erectile dysfunction)  Comprehensive Metabolic Panel    Glycosylated Hemoglobin A1c   5. Abnormal blood sugar  Comprehensive Metabolic Panel    Glycosylated Hemoglobin A1c   6. Sciatica  Ambulatory referral to Neurosurgery        PLAN:   1.  Routine general medical examination at a [facility] care facility  Workup to include  - Comprehensive Metabolic Panel  - Electrocardiogram Perform - Clinic  - PSA (Prostatic-Specific Antigen), Annual Screen  - Urinalysis-UC if Indicated  - Vitamin D, Total (25-Hydroxy)  - HM1(CBC and Differential)  - HM1 (CBC with Diff)    2. Hypercholesteremia  Continue simvastatin workup to include  - Comprehensive Metabolic Panel  - Lipid Cascade  - Thyroid Cascade    3. Prostatism  Exam negative will check his PSA  - PSA (Prostatic-Specific Antigen), Annual Screen    4. ED (erectile dysfunction)  Workup to include  - Comprehensive Metabolic Panel  - Glycosylated Hemoglobin A1c    5. Abnormal blood sugar  We will do his A1c  - Comprehensive Metabolic Panel  - Glycosylated Hemoglobin A1c    6. Sciatica  Referral will be made  - Ambulatory referral to Neurosurgery              This note has been dictated using voice recognition software. Any grammatical or context distortions are unintentional and inherent to the the software.

## 2021-06-12 NOTE — TELEPHONE ENCOUNTER
Patient would like to come in prior to his physical for a lab only appointment.  Please place orders.  Pt has lab appt Monday morning.  Thank you!

## 2021-06-12 NOTE — PROGRESS NOTES
CC: I am here for my checkup I feel well    HPI: It is a pleasure to see Royce again for his annual exam.  He is enjoyed good health during the past year.  He splits his time between [place] and [place].  He is about to winter in the [facility] area.  We did his laboratory last week and his cholesterol and all of his numbers are good including his A1c.  Patient is on simvastatin.  He is very active in terms of aerobic exercise exercising almost on a daily basis.  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  He has a slight cataract and slight increased pressure in his good eye.  He does wear hearing aids hearing is down a bit.  He has no difficulty with his cranial nerves tasting or swallowing his food is smell is good no shortness of breath dyspnea chest pain angina abdominal pain diarrhea constipation urgency frequency dysuria no disturbances of sexual function.  He does use an occasional sildenafil.  He does report that he is getting a little curvature problem when he gets an erection but it is not interfering with the events.  We did discuss surgical correction of this and we would recommend [facility] for that should the need arise.  All medical questions asked were answered I will renew his medications as necessary.  I really enjoyed seeing him again and wish him well and safety during the winter months and will follow him up when he returns.      Review of Systems: A complete 14 point review of systems was obtained and is negative or as stated in the history of present illness.    Past Medical History:   Diagnosis Date     Cancer (H)     melanoma     High cholesterol      Family History   Problem Relation Age of Onset     Cancer Mother      Diabetes Father  "     Cancer Sister      Varicose Veins Sister      Diabetes Brother      Past Surgical History:   Procedure Laterality Date     EYE SURGERY       SKIN BIOPSY       Social History     Tobacco Use     Smoking status: Never Smoker     Smokeless tobacco: Never Used   Substance Use Topics     Alcohol use: Not on file     Drug use: Not on file       PE:   /68 (Patient Site: Right Arm, Patient Position: Sitting, Cuff Size: Adult Regular)   Pulse 68   Ht 5' 4.96\" (1.65 m)   Wt 176 lb 3.2 oz (79.9 kg)   SpO2 98%   BMI 29.36 kg/m       General Appearance:  Alert, cooperative, no distress  Head:  Normocephalic, no obvious abnormality  Ears: TM anatomy normal  Eyes: , EOM's intact, conjunctiva and corneaclear  Nose:  Nares symmetrical, septum midline, mucosa pink, no sinus tenderness  Throat:  Lips, tongue, and mucosa are moist, pink, and intact  Neck:  Supple, symmetrical, trachea midline, no adenopathy; thyroid: no enlargement, symmetric,no tenderness/mass/nodules; no carotid bruit, no JVD  Back:  Symmetrical, no curvature, ROM normal, no CVA tenderness  Chest/Breast:  No mass or tenderness  Lungs:  Clear to auscultation bilaterally, respirations unlabored   Heart:  Normal PMI, regular rate & rhythm, S1 and S2 normal, no murmurs, rubs, or gallops  Abdomen:  Soft, non-tender, bowel sounds active all four quadrants, no mass, or organomegaly  Musculoskeletal:  Tone and strength strong and symmetrical, all extremities  Lymphatic:  No adenopathy  Skin/Hair/Nails:  Skin warm, dry, and intact, no rashes  Neurologic:  Alert and oriented x3, no cranial nerve deficits, normal strength and tone, gait steady  Extremities:  No edema.  Yaz's sign negative.    Genitourinary: prostate normal  Pulses:  Equal bilaterally    Impression:     1. Hypercholesteremia  sildenafiL (VIAGRA) 50 MG tablet    simvastatin (ZOCOR) 80 MG tablet    Electrocardiogram Perform - Clinic   2. Erectile disorder, generalized, mild  sildenafiL (VIAGRA) " 50 MG tablet    simvastatin (ZOCOR) 80 MG tablet   3. Routine general medical examination at a health care facility  sildenafiL (VIAGRA) 50 MG tablet    simvastatin (ZOCOR) 80 MG tablet    Electrocardiogram Perform - Clinic        PLAN:   1. Erectile disorder, generalized, mild  Renew the following medication  - sildenafiL (VIAGRA) 50 MG tablet; TAKE 1 TABLET BY MOUTH 1 HOUR BEFORE NEEDED  Dispense: 18 tablet; Refill: 3  - simvastatin (ZOCOR) 80 MG tablet; One daily  Dispense: 90 tablet; Refill: 3    2. Hypercholesteremia  Continue simvastatin  - sildenafiL (VIAGRA) 50 MG tablet; TAKE 1 TABLET BY MOUTH 1 HOUR BEFORE NEEDED  Dispense: 18 tablet; Refill: 3  - simvastatin (ZOCOR) 80 MG tablet; One daily  Dispense: 90 tablet; Refill: 3  - Electrocardiogram Perform - Clinic    3. Routine general medical examination at a health care facility    - sildenafiL (VIAGRA) 50 MG tablet; TAKE 1 TABLET BY MOUTH 1 HOUR BEFORE NEEDED  Dispense: 18 tablet; Refill: 3  - simvastatin (ZOCOR) 80 MG tablet; One daily  Dispense: 90 tablet; Refill: 3  - Electrocardiogram Perform - Clinic      I have had an Advance Directives discussion with the patient.        This note has been dictated using voice recognition software. Any grammatical or context distortions are unintentional and inherent to the the software.

## 2021-06-13 NOTE — TELEPHONE ENCOUNTER
Received refill request from [facility] for omeprazole. patient was just seen therefore will send a once year supply.

## 2021-06-14 NOTE — PROGRESS NOTES
Assessment/Plan:      Visit for Preoperative Exam.     Patient approved for surgery with general or local anesthesia. Postoperative pain to be managed by surgeon during post-operative [employer] timeframe, typically 30-60 days for major surgery, and less for others. Postoperative Care will be managed by Hospital Service. Copy of the pre-op was given to the patient to bring along on the day of surgery. Follow up as needed. No Cardiology consultation or non-invasive testing. Low Risk Surgery.     Subjective:     Scheduled Procedure: yag capsulotomy   Surgery Date:  12/1/17  Surgery Location:  [place], [place] [facility]  Surgeon:  Dr. Zayas  Indication: Left eye has a growing cataract. Patient has known about the cataract for almost 30 years. It began after an injury. He doesn't see out of that eye. He noticed diminished light flashes as the cataract grew. There is concern for increased pressure given the size of the cataract.    Current Outpatient Prescriptions   Medication Sig Dispense Refill     coenzyme Q10 200 mg capsule Take 200 mg by mouth daily.       MEN'S MULTI-VITAMIN ORAL Take 1 tablet by mouth daily.       OMEGA-3/DHA/EPA/FISH OIL (FISH OIL-OMEGA-3 FATTY ACIDS) 300-1,000 mg capsule Take 2 g by mouth daily.       simvastatin (ZOCOR) 40 MG tablet TAKE 1 TABLET BY MOUTH EVERY DAY AT BEDTIME 90 tablet 2     VIAGRA 50 mg tablet TAKE 1 TABLET BY MOUTH 1 HOUR BEFORE NEEDED 18 tablet 0     No current facility-administered medications for this visit.        No Known Allergies    Immunization History   Administered Date(s) Administered     Hep A, Adult IM (19yr & older) 06/28/2007     Hep A, historic 06/28/2007     Hep B, Adult 06/28/2007     Hep B, historic 06/28/2007     Influenza high dose, seasonal 11/25/2015     Pneumo Conj 13-V (2010&after) 11/25/2015     Typhoid, historic, Unspecified 07/11/2007       Patient Active Problem List   Diagnosis     Hypercholesteremia     Prostatism     High  cholesterol       Past Medical History:   Diagnosis Date     Cancer     melanoma     High cholesterol        Social History     Social History     Marital status:      Spouse name: N/A     Number of children: N/A     Years of education: N/A     Occupational History     Not on file.     Social History Main Topics     Smoking status: Never Smoker     Smokeless tobacco: Never Used     Alcohol use Not on file     Drug use: Not on file     Sexual activity: Not on file     Other Topics Concern     Not on file     Social History Narrative       Past Surgical History:   Procedure Laterality Date     EYE SURGERY       SKIN BIOPSY         History of Present Illness  Recent Health  Fever: no  Chills: no  Fatigue: no  Chest Pain: no  Cough: no  Dyspnea: no  Urinary Frequency: no  Nausea: no  Vomiting: no  Diarrhea: no  Abdominal Pain: no  Easy Bruising: yes, but attributed to age, no major changes in the last few months, no trouble stopping bleeding  Lower Extremity Swelling: no  Poor Exercise Tolerance: no    Most recent Health Maintenance Visit:  7 month(s) ago    Pertinent History  Prior Anesthesia: yes  Previous Anesthesia Reaction:  no  Diabetes: no  Cardiovascular Disease: no  Pulmonary Disease: no  Renal Disease: no  GI Disease: no  Sleep Apnea: yes, had a CPAP but mouthpiece is working well  Thromboembolic Problems: no  Clotting Disorder: no  Bleeding Disorder: no  Transfusion Reaction: no  Impaired Immunity: no  Steroid use in the last 6 months: no  Frequent Aspirin use: no    Family history of father had diabetes, brother  from MI    Social history of patient does not wear denture or partial plates, there is no transfusion refusal, NSAID use and there are no concerns regarding care after surgery    After surgery, the patient plans to recover at home with family.    Review of Systems  Review of Systems       Endorses: constipation, arthritis, back pain, sexual dysfunction, easy bruising and easy  "bleeding      Objective:         Vitals:    11/16/17 1100   BP: 112/78   Pulse: 70   SpO2: 98%   Weight: 179 lb (81.2 kg)   Height: 5' 6\" (1.676 m)       Physical Exam:  Physical Exam     Gen: Alert, NAD, appears stated age, normal hygiene   Eyes: conjunctivae without injection, sclera clear, EOMI, right pupil round and reactive to light; no reaction in left eye to light; left eye does constrict to light shined into contralateral eye  ENT/mouth: nares clear, septum midline, absent rhinorrhea, throat without injection, neck is supple, no thyroid enlargement  CV: RRR, no murmur appreciated, pedal edema absent bilaterally  Resp: CTAB, no wheezes, rales or ronchi  ABD: normoactive, non-tender to palpation, nondistended  MSK: grossly full range of motion in all joints, no obvious deformity  Neuro: CN II-XII grossly intact, no deficits in coordination  Psych: no apparent hallucinations or delusions, no pressured speech; alert, oriented x3  SKIN: dry and without lesions  Heme/lymph: no pallor, no active bleeding/bruising, bilateral submandibular adenopathy appreciated    "

## 2021-06-16 PROBLEM — C43.59 MALIGNANT MELANOMA OF TORSO EXCLUDING BREAST (H): Status: ACTIVE | Noted: 2017-11-16

## 2021-06-17 NOTE — PATIENT INSTRUCTIONS - HE
Patient Instructions by Luis Alfredo Bolanos MD at 9/25/2019 12:00 PM     Author: Luis Alfredo Bolanos MD Service: -- Author Type: Physician    Filed: 9/25/2019 12:36 PM Encounter Date: 9/25/2019 Status: Signed    : Luis Alfredo Bolanos MD (Physician)         Patient Education   Understanding USDA MyPlate  The USDA (US Department of Agriculture) has guidelines to help you make healthy food choices. These are called MyPlate. MyPlate shows the food groups that make up healthy meals using the image of a place setting. Before you eat, think about the healthiest choices for what to put onto your plate or into your cup or bowl. To learn more about building a healthy plate, visit www.choosemyplate.gov.       The Food Groups    Fruits: Any fruit or 100% fruit juice counts as part of the Fruit Group. Fruits may be fresh, canned, frozen, or dried, and may be whole, cut-up, or pureed. Make half your plate fruits and vegetables.    Vegetables: Any vegetable or 100% vegetable juice counts as a member of the Vegetable Group. Vegetables may be fresh, frozen, canned, or dried. They can be served raw or cooked and may be whole, cut-up, or mashed. Make half your plate fruits and vegetables.     Grains: All foods made from grains are part of the Grains Group. These include wheat, rice, oats, cornmeal, and barley such as bread, pasta, oatmeal, cereal, tortillas, and grits. Grains should be no more than a quarter of your plate. At least half of your grains should be whole grains.    Protein: This group includes meat, poultry, seafood, beans and peas, eggs, processed soy products (like tofu), nuts (including nut butters), and seeds. Make protein choices no more than a quarter of your plate. Meat and poultry choices should be lean or low fat.    Dairy: All fluid milk products and foods made from milk that contain calcium, like yogurt and cheese are part of the Dairy Group. (Foods that have little calcium, such as cream, butter, and cream cheese,  are not part of the group.) Most dairy choices should be low-fat or fat-free.    Oils: These are fats that are liquid at room temperature. They include canola, corn, olive, soybean, and sunflower oil. Foods that are mainly oil include mayonnaise, certain salad dressings, and soft margarines. You should have only 5 to 7 teaspoons of oils a day. You probably already get this much from the food you eat.  Use [employer] to Help Build Your Meals  The SuperTracker can help you plan and track your meals and activity. You can look up individual foods to see or compare their nutritional value. You can get guidelines for what and how much you should eat. You can compare your food choices. And you can assess personal physical activities and see ways you can improve. Go to www.Neonode.gov/Concur Japancker/.    6405-9453 [employer]. [address] [place] [address]. All rights reserved. This information is not intended as a substitute for professional medical care. Always follow your healthcare professional's instructions.             Advance Directive  Patients advance directive was discussed and I am comfortable with the patients wishes.  Patient Education   Personalized Prevention Plan  You are due for the preventive services outlined below.  Your care team is available to assist you in scheduling these services.  If you have already completed any of these items, please share that information with your care team to update in your medical record.  Health Maintenance   Topic Date Due   ? HEPATITIS C SCREENING  1950   ? TD 18+ HE  12/05/1966   ? PNEUMOCOCCAL IMMUNIZATION 65+ LOW/MEDIUM RISK (1 of 2 - PCV13) 12/05/2013   ? MEDICARE ANNUAL WELLNESS VISIT  11/25/2016   ? INFLUENZA VACCINE RULE BASED (1) 08/01/2019   ? ZOSTER VACCINES (1 of 2) 09/26/2018   ? FALL RISK ASSESSMENT  09/25/2020   ? ADVANCE CARE PLANNING  07/11/2023   ? COLONOSCOPY  12/17/2025

## 2021-06-18 NOTE — PROGRESS NOTES
CC: I am here for my checkup; I am getting short of breath    HPI: Patient has been under my care for many years; he does have hyperlipidemia; the patient has been very medicine compliant and is healthy and exercise on a regular basis.  However over the last 3-4 months he has noticed increasing shortness of breath with normal activities and it seems to be progressive is also experiencing some atypical chest discomfort which she attributes to possible acid reflux but this is unusual for him.  He has had some intermittent numbness on his left side when he sleeps and some intermittent numbness in his hands.  The patient does have sleep apnea.  Today's examination which was essentially unremarkable and unchanged except for his story and reviewing his EKGs when comparing this to 1 year ago shows some R-wave changes consistent with perhaps early transition changes consistent with coronary artery disease or possible posterior infarction.  Abnormal EKG must be investigated and traced down.  I shared her feelings with the patient and work on a schedule a stress echo and proceed on with her workup.  The patient does take Viagra for ED will have him discontinue that and just to normal routine acts of daily living until we work this problem up.  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  I am concerned about the patient's increasing shortness of breath and lethargy.  Other than that he denies any hemoptysis nocturnal dyspnea peripheral edema abdominal changes diarrhea constipation urgency frequency dysuria.  Will follow along once the stress echo is complete.  Patient lost 2 brothers in their early 70s here in the last year I did take care of his father who also had coronary artery disease      Review of Systems: A complete 14 point review of systems was obtained and is negative or as stated in the history of present illness.    Past Medical History:  "  Diagnosis Date     Cancer     melanoma     High cholesterol      Family History   Problem Relation Age of Onset     Cancer Mother      Diabetes Father      Cancer Sister      Varicose Veins Sister      Diabetes Brother      Past Surgical History:   Procedure Laterality Date     EYE SURGERY       SKIN BIOPSY       Social History   Substance Use Topics     Smoking status: Never Smoker     Smokeless tobacco: Never Used     Alcohol use None       PE:   /64  Pulse 76  Ht 5' 6.5\" (1.689 m)  Wt 177 lb (80.3 kg)  SpO2 98%  BMI 28.14 kg/m2     General Appearance:  Alert, cooperative, no distress  Head:  Normocephalic, no obvious abnormality  Ears: TM anatomy normal  Eyes:  PERRL, EOM's intact, conjunctiva and corneas clear  Nose:  Nares symmetrical, septum midline, mucosa pink, no sinus tenderness  Throat:  Lips, tongue, and mucosa are moist, pink, and intact  Neck:  Supple, symmetrical, trachea midline, no adenopathy; thyroid: no enlargement, symmetric,no tenderness/mass/nodules; no carotid bruit, no JVD  Back:  Symmetrical, no curvature, ROM normal, no CVA tenderness  Chest/Breast:  No mass or tenderness  Lungs:  Clear to auscultation bilaterally, respirations unlabored   Heart:  Normal PMI, regular rate & rhythm, S1 and S2 normal, no murmurs, rubs, or gallops  Abdomen:  Soft, non-tender, bowel sounds active all four quadrants, no mass, or organomegaly  Musculoskeletal:  Tone and strength strong and symmetrical, all extremities  Lymphatic:  No adenopathy  Skin/Hair/Nails:  Skin warm, dry, and intact, no rashes  Neurologic:  Alert and oriented x3, no cranial nerve deficits, normal strength and tone, gait steady  Extremities:  No edema.  Yaz's sign negative.    Genitourinary: deferred  Pulses:  Equal bilaterally    Impression:     1. Medicare annual wellness visit, initial  Comprehensive Metabolic Panel    Electrocardiogram Perform - Clinic    Urinalysis-UC if Indicated    HM1(CBC and Differential)    HM1 (CBC " with Diff)   2. Hypercholesteremia  Comprehensive Metabolic Panel    Electrocardiogram Perform - Clinic    Lipid [place]   3. ED (erectile dysfunction)  Comprehensive Metabolic Panel    Electrocardiogram Perform - Clinic    Glycosylated Hemoglobin A1c   4. SOB (shortness of breath)  Comprehensive Metabolic Panel    Electrocardiogram Perform - Clinic    Echo Stress Exercise    CANCELED: Echo Stress Exercise   5. Prostatism  PSA (Prostatic-Specific Antigen), Annual Screen    Urinalysis-UC if Indicated        PLAN:   1. Medicare annual wellness visit, initial  Workup to include  - Comprehensive Metabolic Panel  - Electrocardiogram Perform - Clinic  - Urinalysis-UC if Indicated  - HM1(CBC and Differential)  - HM1 (CBC with Diff)    2. Hypercholesteremia  Workup to include the following  - Comprehensive Metabolic Panel  - Electrocardiogram Perform - Clinic  - Lipid [place]    3. ED (erectile dysfunction)  Patient should not use any Viagra until we complete the workup  - Comprehensive Metabolic Panel  - Electrocardiogram Perform - Clinic  - Glycosylated Hemoglobin A1c    4. SOB (shortness of breath)  Stress echo will be scheduled  - Comprehensive Metabolic Panel  - Electrocardiogram Perform - Clinic  - Echo Stress Exercise; Future    5. Prostatism  No change in medication  - PSA (Prostatic-Specific Antigen), Annual Screen  - Urinalysis-UC if Indicated      I have had an Advance Directives discussion with the patient.        This note has been dictated using voice recognition software. Any grammatical or context distortions are unintentional and inherent to the the software.

## 2021-06-18 NOTE — PROGRESS NOTES
Assessment:     1. Atypical chest pain         Plan:     1. Atypical chest pain  Patient is being seen today for results of his stress echo which show some changes in the inferior leads consistent with his recent EKG.  I have recommended cardiac consultation and patient is a registered patient at [facility] and we have decided to go there to see if patient needs further workup and to have his cardiovascular status reviewed.  Lipids were under good control ejection fraction 60% question is this patient need angiography.  Strong family history heart disease with recent death of 2 brothers.  Hopefully consultation can be obtained within a week or so otherwise we will pursue cardiology consultation within the [facility] system      Subjective:   Patient returns at my request and we go over the results of his stress echo which he has successfully passed with negative results for ischemia but he does have some stress echo findings consistent with electrocardiogram in the inferior leads which are a little troublesome with regards to the question of does he have any lesions in his LAD area.  I would suggest cardiac consultation preferably soon within a week or so for further workup and possible CT angiogram or classical angiogram.  Patient is enrolled and is an active patient at [facility] and may seek consultation there if not I will arrange appear within the [facility] system patient is experiencing more shortness of breath without any chest pain but a definite change from 1 year ago.  Patient may be deconditioned but we need to definitely rule out cardiovascular disease.  Patient understands he will let me know his decision and we will help him in any way but I think he can self refer through [facility].or website which now has access to epic system    Review of Systems: A complete 14 point review of systems was obtained and is negative or as stated in the history of present illness.    Past Medical History:   Diagnosis  "Date     Cancer     melanoma     High cholesterol      Family History   Problem Relation Age of Onset     Cancer Mother      Diabetes Father      Cancer Sister      Varicose Veins Sister      Diabetes Brother      Past Surgical History:   Procedure Laterality Date     EYE SURGERY       SKIN BIOPSY       Social History   Substance Use Topics     Smoking status: Never Smoker     Smokeless tobacco: Never Used     Alcohol use None         Objective:   /70  Pulse 88  Ht 5' 5.5\" (1.664 m)  Wt 178 lb 1.6 oz (80.8 kg)  BMI 29.19 kg/m2    General Appearance:  Alert, cooperative, no distress  Head:  Normocephalic, no obvious abnormality  Ears: TM anatomy normal  Eyes:  PERRL, EOM's intact, conjunctiva and corneas clear  Nose:  Nares symmetrical, septum midline, mucosa pink, no sinus tenderness  Throat:  Lips, tongue, and mucosa are moist, pink, and intact  Neck:  Supple, symmetrical, trachea midline, no adenopathy; thyroid: no enlargement, symmetric,no tenderness/mass/nodules; no carotid bruit, no JVD  Back:  Symmetrical, no curvature, ROM normal, no CVA tenderness  Chest/Breast:  No mass or tenderness  Lungs:  Clear to auscultation bilaterally, respirations unlabored   Heart:  Normal PMI, regular rate & rhythm, S1 and S2 normal, no murmurs, rubs, or gallops  Abdomen:  Soft, non-tender, bowel sounds active all four quadrants, no mass, or organomegaly  Musculoskeletal:  Tone and strength strong and symmetrical, all extremities  Lymphatic:  No adenopathy  Skin/Hair/Nails:  Skin warm, dry, and intact, no rashes  Neurologic:  Alert and oriented x3, no cranial nerve deficits, normal strength and tone, gait steady  Extremities:  No edema.  Yaz's sign negative.    Genitourinary: deferred  Pulses:  Equal bilaterally     I have had an Advance Directives discussion with the patient.      This note has been dictated using voice recognition software. Any grammatical or context distortions are unintentional and inherent to the " the software.

## 2021-06-19 NOTE — PROGRESS NOTES
Assessment:     1. CAD (coronary artery disease)     2. Erectile disorder, generalized, mild  sildenafil (VIAGRA) 50 MG tablet    simvastatin (ZOCOR) 80 MG tablet   3. Hypercholesteremia         Plan:     1. Erectile disorder, generalized, mild  Patient may renew the following medication  - sildenafil (VIAGRA) 50 MG tablet; TAKE 1 TABLET BY MOUTH 1 HOUR BEFORE NEEDED  Dispense: 18 tablet; Refill: 0  - simvastatin (ZOCOR) 80 MG tablet; Take 1 tablet (80 mg total) by mouth every evening.  Dispense: 90 tablet; Refill: 2    2. CAD (coronary artery disease)  We will increase his simvastatin to 80 mg/day    3. Hypercholesteremia  Increase as per above; aerobic exercise 40 minutes/day I am seeing Royce for consultation.  Patient went to [facility]      Subjective:   Where he had an MRI and an echo scan of chest.  Patient does have moderate coronary artery disease and left anterior descending but not distensible at this point.  [facility] feels he is 50-70% occluded.  I agree with the workup at the [facility] that patient should increase his simvastatin 80 mg/day.  In terms of cardiac hygiene he is doing quite well weight is ideal blood sugar controlled he exercises and he will increase this.  He is also having a flare of his GERD.  We will have him take his Prilosec 20 mg in the morning.  He is to take his cholesterol medication in the evening.  I also feel he should start a baby aspirin.  We will follow along and see him for his routine follow-up 6 months.  All medical questions that were asked were answered I personally reviewed family social history surgeries allergies problems list.  Patient may begin to eat some eggs about 4/week.  He also should eat a regular breakfast after taking his PPI inhibitor in the morning.  We also refilled his Viagra.  Pleasure to see the patient again.    Review of Systems: A complete 14 point review of systems was obtained and is negative or as stated in the history of present  "illness.    Past Medical History:   Diagnosis Date     Cancer (H)     melanoma     High cholesterol      Family History   Problem Relation Age of Onset     Cancer Mother      Diabetes Father      Cancer Sister      Varicose Veins Sister      Diabetes Brother      Past Surgical History:   Procedure Laterality Date     EYE SURGERY       SKIN BIOPSY       Social History   Substance Use Topics     Smoking status: Never Smoker     Smokeless tobacco: Never Used     Alcohol use None         Objective:   /72  Pulse 74  Ht 5' 5.5\" (1.664 m)  Wt 179 lb 12.8 oz (81.6 kg)  BMI 29.47 kg/m2    General Appearance:  Alert, cooperative, no distress  Head:  Normocephalic, no obvious abnormality  Ears: TM anatomy normal  Eyes:  PERRL, EOM's intact, conjunctiva and corneas clear  Nose:  Nares symmetrical, septum midline, mucosa pink, no sinus tenderness  Throat:  Lips, tongue, and mucosa are moist, pink, and intact  Neck:  Supple, symmetrical, trachea midline, no adenopathy; thyroid: no enlargement, symmetric,no tenderness/mass/nodules; no carotid bruit, no JVD  Back:  Symmetrical, no curvature, ROM normal, no CVA tenderness  Chest/Breast:  No mass or tenderness  Lungs:  Clear to auscultation bilaterally, respirations unlabored   Heart:  Normal PMI, regular rate & rhythm, S1 and S2 normal, no murmurs, rubs, or gallops  Abdomen:  Soft, non-tender, bowel sounds active all four quadrants, no mass, or organomegaly  Musculoskeletal:  Tone and strength strong and symmetrical, all extremities  Lymphatic:  No adenopathy  Skin/Hair/Nails:  Skin warm, dry, and intact, no rashes  Neurologic:  Alert and oriented x3, no cranial nerve deficits, normal strength and tone, gait steady  Extremities:  No edema.  Yaz's sign negative.    Genitourinary: deferred  Pulses:  Equal bilaterally     I have had an Advance Directives discussion with the patient.      This note has been dictated using voice recognition software. Any grammatical or " context distortions are unintentional and inherent to the the software.

## 2021-06-19 NOTE — LETTER
Letter by Luis Alfredo Bolanos MD at      Author: Luis Alfredo Bolanos MD Service: -- Author Type: --    Filed:  Encounter Date: 9/26/2019 Status: Signed         Royce Feliz  [address] [place] [address]             September 26, 2019         Dear Mr. Feliz,    Below are the results from your recent visit:    Resulted Orders   Comprehensive Metabolic Panel   Result Value Ref Range    Sodium 141 136 - 145 mmol/L    Potassium 4.1 3.5 - 5.0 mmol/L    Chloride 104 98 - 107 mmol/L    CO2 28 22 - 31 mmol/L    Anion Gap, Calculation 9 5 - 18 mmol/L    Glucose 93 70 - 125 mg/dL    BUN 19 8 - 28 mg/dL    Creatinine 1.24 0.70 - 1.30 mg/dL    GFR MDRD Af Amer >60 >60 mL/min/1.73m2    GFR MDRD Non Af Amer 58 (L) >60 mL/min/1.73m2    Bilirubin, Total 0.8 0.0 - 1.0 mg/dL    Calcium 9.8 8.5 - 10.5 mg/dL    Protein, Total 7.3 6.0 - 8.0 g/dL    Albumin 4.6 3.5 - 5.0 g/dL    Alkaline Phosphatase 78 45 - 120 U/L    AST 25 0 - 40 U/L    ALT 30 0 - 45 U/L    Narrative    Fasting Glucose reference range is 70-99 mg/dL per  American Diabetes Association (ADA) guidelines.   Glycosylated Hemoglobin A1c   Result Value Ref Range    Hemoglobin A1c 5.8 3.5 - 6.0 %   HM2(CBC w/o Differential)   Result Value Ref Range    WBC 6.9 4.0 - 11.0 thou/uL    RBC 4.55 4.40 - 6.20 mill/uL    Hemoglobin 14.4 14.0 - 18.0 g/dL    Hematocrit 42.2 40.0 - 54.0 %    MCV 93 80 - 100 fL    MCH 31.7 27.0 - 34.0 pg    MCHC 34.2 32.0 - 36.0 g/dL    RDW 12.8 11.0 - 14.5 %    Platelets 174 140 - 440 thou/uL    MPV 9.5 7.0 - 10.0 fL   Lipid Cascade   Result Value Ref Range    Cholesterol 151 <=199 mg/dL    Triglycerides 86 <=149 mg/dL    HDL Cholesterol 58 >=40 mg/dL    LDL Calculated 76 <=129 mg/dL    Patient Fasting > 8hrs? Yes    PSA (Prostatic-Specific Antigen), Annual Screen   Result Value Ref Range    PSA 0.8 0.0 - 6.5 ng/mL    Narrative    Method is Abbott Prostate-Specific Antigen (PSA)  Standard-WHO 1st International (90:10)   Urinalysis-UC if  Indicated   Result Value Ref Range    Color, UA Yellow Colorless, Yellow, Straw, Light Yellow    Clarity, UA Clear Clear    Glucose, UA Negative Negative    Bilirubin, UA Negative Negative    Ketones, UA Negative Negative    Specific Gravity, UA 1.025 1.005 - 1.030    Blood, UA Negative Negative    pH, UA 5.5 5.0 - 8.0    Protein, UA Negative Negative mg/dL    Urobilinogen, UA 0.2 E.U./dL 0.2 E.U./dL, 1.0 E.U./dL    Nitrite, UA Negative Negative    Leukocytes, UA Negative Negative    Narrative    Microscopic not indicated  UC not indicated       All good Royce    Please call with questions or contact us using [facility].    Sincerely,        Electronically signed by Luis Alfredo Bolanos MD

## 2021-06-23 NOTE — TELEPHONE ENCOUNTER
Pt calling requesting rx for prilosec to be sent to pharmacy so it can go through insurance vs. Paying out of pocket. Also requesting refill of viagra. Rx's set up for your auth

## 2021-08-15 ENCOUNTER — TRANSFERRED RECORDS (OUTPATIENT)
Dept: HEALTH INFORMATION MANAGEMENT | Facility: CLINIC | Age: 71
End: 2021-08-15

## 2021-10-15 ENCOUNTER — TELEPHONE (OUTPATIENT)
Dept: FAMILY MEDICINE | Facility: CLINIC | Age: 71
End: 2021-10-15

## 2021-10-21 NOTE — TELEPHONE ENCOUNTER
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 I would recommend he finds another facility for the DOT.

## 2021-10-21 NOTE — TELEPHONE ENCOUNTER
Spoke to Royce and informed him. He will find another DOT provider with a flat rate. Still plans to keep visit with Dr WALKER

## 2021-11-17 ENCOUNTER — OFFICE VISIT (OUTPATIENT)
Dept: FAMILY MEDICINE | Facility: CLINIC | Age: 71
End: 2021-11-17
Payer: COMMERCIAL

## 2021-11-17 VITALS
WEIGHT: 177 LBS | SYSTOLIC BLOOD PRESSURE: 118 MMHG | BODY MASS INDEX: 29.49 KG/M2 | HEIGHT: 65 IN | DIASTOLIC BLOOD PRESSURE: 70 MMHG | HEART RATE: 68 BPM

## 2021-11-17 DIAGNOSIS — Z23 HIGH PRIORITY FOR 2019-NCOV VACCINE: ICD-10-CM

## 2021-11-17 DIAGNOSIS — F52.21 ERECTILE DISORDER, GENERALIZED, MILD: ICD-10-CM

## 2021-11-17 DIAGNOSIS — Z00.00 ROUTINE GENERAL MEDICAL EXAMINATION AT A HEALTH CARE FACILITY: ICD-10-CM

## 2021-11-17 DIAGNOSIS — Z12.5 ENCOUNTER FOR SCREENING FOR MALIGNANT NEOPLASM OF PROSTATE: ICD-10-CM

## 2021-11-17 DIAGNOSIS — E78.00 HYPERCHOLESTEREMIA: ICD-10-CM

## 2021-11-17 DIAGNOSIS — R73.01 IMPAIRED FASTING GLUCOSE: Primary | ICD-10-CM

## 2021-11-17 DIAGNOSIS — R07.89 ATYPICAL CHEST PAIN: ICD-10-CM

## 2021-11-17 LAB
ALBUMIN SERPL-MCNC: 4.3 G/DL (ref 3.5–5)
ALBUMIN UR-MCNC: NEGATIVE MG/DL
ALP SERPL-CCNC: 92 U/L (ref 45–120)
ALT SERPL W P-5'-P-CCNC: 26 U/L (ref 0–45)
ANION GAP SERPL CALCULATED.3IONS-SCNC: 14 MMOL/L (ref 5–18)
APPEARANCE UR: CLEAR
AST SERPL W P-5'-P-CCNC: 21 U/L (ref 0–40)
ATRIAL RATE - MUSE: 74 BPM
BILIRUB SERPL-MCNC: 0.6 MG/DL (ref 0–1)
BILIRUB UR QL STRIP: NEGATIVE
BUN SERPL-MCNC: 21 MG/DL (ref 8–28)
CALCIUM SERPL-MCNC: 9.7 MG/DL (ref 8.5–10.5)
CHLORIDE BLD-SCNC: 106 MMOL/L (ref 98–107)
CHOLEST SERPL-MCNC: 144 MG/DL
CO2 SERPL-SCNC: 22 MMOL/L (ref 22–31)
COLOR UR AUTO: YELLOW
CREAT SERPL-MCNC: 1.21 MG/DL (ref 0.7–1.3)
DIASTOLIC BLOOD PRESSURE - MUSE: NORMAL MMHG
ERYTHROCYTE [DISTWIDTH] IN BLOOD BY AUTOMATED COUNT: 13.6 % (ref 10–15)
FASTING STATUS PATIENT QL REPORTED: YES
GFR SERPL CREATININE-BSD FRML MDRD: 59 ML/MIN/1.73M2
GLUCOSE BLD-MCNC: 105 MG/DL (ref 70–125)
GLUCOSE UR STRIP-MCNC: NEGATIVE MG/DL
HBA1C MFR BLD: 5.4 % (ref 0–5.6)
HCT VFR BLD AUTO: 44.2 % (ref 40–53)
HDLC SERPL-MCNC: 50 MG/DL
HGB BLD-MCNC: 15 G/DL (ref 13.3–17.7)
HGB UR QL STRIP: NEGATIVE
INTERPRETATION ECG - MUSE: NORMAL
KETONES UR STRIP-MCNC: NEGATIVE MG/DL
LDLC SERPL CALC-MCNC: 82 MG/DL
LEUKOCYTE ESTERASE UR QL STRIP: NEGATIVE
MCH RBC QN AUTO: 32.3 PG (ref 26.5–33)
MCHC RBC AUTO-ENTMCNC: 33.9 G/DL (ref 31.5–36.5)
MCV RBC AUTO: 95 FL (ref 78–100)
NITRATE UR QL: NEGATIVE
P AXIS - MUSE: 20 DEGREES
PH UR STRIP: 5.5 [PH] (ref 5–8)
PLATELET # BLD AUTO: 180 10E3/UL (ref 150–450)
POTASSIUM BLD-SCNC: 4.3 MMOL/L (ref 3.5–5)
PR INTERVAL - MUSE: 158 MS
PROT SERPL-MCNC: 6.9 G/DL (ref 6–8)
PSA SERPL-MCNC: 0.86 UG/L (ref 0–6.5)
QRS DURATION - MUSE: 80 MS
QT - MUSE: 374 MS
QTC - MUSE: 415 MS
R AXIS - MUSE: 40 DEGREES
RBC # BLD AUTO: 4.65 10E6/UL (ref 4.4–5.9)
SODIUM SERPL-SCNC: 142 MMOL/L (ref 136–145)
SP GR UR STRIP: 1.02 (ref 1–1.03)
SYSTOLIC BLOOD PRESSURE - MUSE: NORMAL MMHG
T AXIS - MUSE: 8 DEGREES
TRIGL SERPL-MCNC: 61 MG/DL
UROBILINOGEN UR STRIP-ACNC: 0.2 E.U./DL
VENTRICULAR RATE- MUSE: 74 BPM
WBC # BLD AUTO: 7.8 10E3/UL (ref 4–11)

## 2021-11-17 PROCEDURE — 80053 COMPREHEN METABOLIC PANEL: CPT | Performed by: FAMILY MEDICINE

## 2021-11-17 PROCEDURE — 83036 HEMOGLOBIN GLYCOSYLATED A1C: CPT | Performed by: FAMILY MEDICINE

## 2021-11-17 PROCEDURE — 0064A COVID-19,PF,MODERNA (18+ YRS BOOSTER .25ML): CPT | Performed by: FAMILY MEDICINE

## 2021-11-17 PROCEDURE — 93005 ELECTROCARDIOGRAM TRACING: CPT | Performed by: FAMILY MEDICINE

## 2021-11-17 PROCEDURE — 93010 ELECTROCARDIOGRAM REPORT: CPT | Mod: OFF | Performed by: INTERNAL MEDICINE

## 2021-11-17 PROCEDURE — 99397 PER PM REEVAL EST PAT 65+ YR: CPT | Performed by: FAMILY MEDICINE

## 2021-11-17 PROCEDURE — 80061 LIPID PANEL: CPT | Performed by: FAMILY MEDICINE

## 2021-11-17 PROCEDURE — G0103 PSA SCREENING: HCPCS | Performed by: FAMILY MEDICINE

## 2021-11-17 PROCEDURE — 99213 OFFICE O/P EST LOW 20 MIN: CPT | Mod: 25 | Performed by: FAMILY MEDICINE

## 2021-11-17 PROCEDURE — 81003 URINALYSIS AUTO W/O SCOPE: CPT | Performed by: FAMILY MEDICINE

## 2021-11-17 PROCEDURE — 85027 COMPLETE CBC AUTOMATED: CPT | Performed by: FAMILY MEDICINE

## 2021-11-17 PROCEDURE — 91306 COVID-19,PF,MODERNA (18+ YRS BOOSTER .25ML): CPT | Performed by: FAMILY MEDICINE

## 2021-11-17 PROCEDURE — 36415 COLL VENOUS BLD VENIPUNCTURE: CPT | Performed by: FAMILY MEDICINE

## 2021-11-17 RX ORDER — SILDENAFIL 50 MG/1
TABLET, FILM COATED ORAL
Qty: 18 TABLET | Refills: 3 | Status: SHIPPED | OUTPATIENT
Start: 2021-11-17 | End: 2024-02-09

## 2021-11-17 RX ORDER — SIMVASTATIN 80 MG
TABLET ORAL
Qty: 90 TABLET | Refills: 3 | Status: SHIPPED | OUTPATIENT
Start: 2021-11-17 | End: 2023-01-30

## 2021-11-17 ASSESSMENT — ACTIVITIES OF DAILY LIVING (ADL): CURRENT_FUNCTION: NO ASSISTANCE NEEDED

## 2021-11-17 ASSESSMENT — PATIENT HEALTH QUESTIONNAIRE - PHQ9
SUM OF ALL RESPONSES TO PHQ QUESTIONS 1-9: 0
SUM OF ALL RESPONSES TO PHQ QUESTIONS 1-9: 0
10. IF YOU CHECKED OFF ANY PROBLEMS, HOW DIFFICULT HAVE THESE PROBLEMS MADE IT FOR YOU TO DO YOUR WORK, TAKE CARE OF THINGS AT HOME, OR GET ALONG WITH OTHER PEOPLE: NOT DIFFICULT AT ALL

## 2021-11-17 ASSESSMENT — MIFFLIN-ST. JEOR: SCORE: 1471.81

## 2021-11-17 NOTE — LETTER
November 18, 2021      Royce Feliz  [address]  [place] [address]        Dear ,    We are writing to inform you of your test results.    Royce all of your tests look really good; the over read by the cardiologist of your cardiogram is normal.  Routine cardiology follow-up at [facility] in the spring when you return.  Nice to see you again    Resulted Orders   CBC with platelets   Result Value Ref Range    WBC Count 7.8 4.0 - 11.0 10e3/uL    RBC Count 4.65 4.40 - 5.90 10e6/uL    Hemoglobin 15.0 13.3 - 17.7 g/dL    Hematocrit 44.2 40.0 - 53.0 %    MCV 95 78 - 100 fL    MCH 32.3 26.5 - 33.0 pg    MCHC 33.9 31.5 - 36.5 g/dL    RDW 13.6 10.0 - 15.0 %    Platelet Count 180 150 - 450 10e3/uL   Comprehensive metabolic panel   Result Value Ref Range    Sodium 142 136 - 145 mmol/L    Potassium 4.3 3.5 - 5.0 mmol/L    Chloride 106 98 - 107 mmol/L    Carbon Dioxide (CO2) 22 22 - 31 mmol/L    Anion Gap 14 5 - 18 mmol/L    Urea Nitrogen 21 8 - 28 mg/dL    Creatinine 1.21 0.70 - 1.30 mg/dL    Calcium 9.7 8.5 - 10.5 mg/dL    Glucose 105 70 - 125 mg/dL    Alkaline Phosphatase 92 45 - 120 U/L    AST 21 0 - 40 U/L    ALT 26 0 - 45 U/L    Protein Total 6.9 6.0 - 8.0 g/dL    Albumin 4.3 3.5 - 5.0 g/dL    Bilirubin Total 0.6 0.0 - 1.0 mg/dL    GFR Estimate 59 (L) >60 mL/min/1.73m2      Comment:      As of July 11, 2021, eGFR is calculated by the CKD-EPI creatinine equation, without race adjustment. eGFR can be influenced by muscle mass, exercise, and diet. The reported eGFR is an estimation only and is only applicable if the renal function is stable.   HEMOGLOBIN A1C   Result Value Ref Range    Hemoglobin A1C 5.4 0.0 - 5.6 %      Comment:      Normal <5.7%   Prediabetes 5.7-6.4%    Diabetes 6.5% or higher     Note: Adopted from ADA consensus guidelines.   Lipid panel reflex to direct LDL Fasting   Result Value Ref Range    Cholesterol 144 <=199 mg/dL    Triglycerides 61 <=149 mg/dL    Direct Measure HDL 50  >=40 mg/dL      Comment:      HDL Cholesterol Reference Range:     0-2 years:   No reference ranges established for patients under 2 years old  at [facility] for lipid analytes.    2-8 years:  Greater than 45 mg/dL     18 years and older:   Female: Greater than or equal to 50 mg/dL   Male:   Greater than or equal to 40 mg/dL    LDL Cholesterol Calculated 82 <=129 mg/dL    Patient Fasting > 8hrs? Yes    Prostate Specific Antigen Screen   Result Value Ref Range    Prostate Specific Antigen Screen 0.86 0.00 - 6.50 ug/L    Narrative    Assay Method is Abbott Prostate-Specific Antigen (PSA)  Standard-WHO 1st International (90:10)   UA reflex to Microscopic and Culture   Result Value Ref Range    Color Urine Yellow Colorless, Straw, Light Yellow, Yellow    Appearance Urine Clear Clear    Glucose Urine Negative Negative mg/dL    Bilirubin Urine Negative Negative    Ketones Urine Negative Negative mg/dL    Specific Gravity Urine 1.025 1.005 - 1.030    Blood Urine Negative Negative    pH Urine 5.5 5.0 - 8.0    Protein Albumin Urine Negative Negative mg/dL    Urobilinogen Urine 0.2 0.2, 1.0 E.U./dL    Nitrite Urine Negative Negative    Leukocyte Esterase Urine Negative Negative    Narrative    Microscopic not indicated       If you have any questions or concerns, please call the clinic at the number listed above.       Sincerely,      Luis Alfredo Bolanos MD

## 2021-11-17 NOTE — PROGRESS NOTES
SUBJECTIVE:   Royce Feliz is a 70 year old male who presents for Preventive Visit.  Are you in the first 12 months of your Medicare coverage?  No  Chief complaint I feel well; I need a referral back to [facility] for my heart; I would like to see a urologist for penile curvature  HPI amarilis Crane presents today for his annual Medicare physical and follow-up of his high lipids he does have coronary artery disease 70% in 1 vessel and is being followed by [facility].  Last stress echo and cardiac work-up was in 2018 he is due for cardiovascular follow-up and will make an appointment with [facility] in April 2022.  He remains asymptomatic no shortness of breath dyspnea chest pain or angina.  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  His sons have taken over the business and are all doing well.  His wife is well his children are all well.  He has been having some low back issues he is doing some core exercises; he does not have a surgical back.  His sciatica bothers him and limits his aerobic walking to a mile and a half.  He does do daily cardiovascular weight lifting that produces a light sweat however.  He has noticed some curvature of his penis over the last 2 to 3 years.  We will get a urological consultation in the spring when he returns from the [facility] area in [place].  I have reviewed his chart all medical questions asked were answered.  Full work-up will pursue here.  We will recheck his EKG we will continue with his omeprazole for his GERD he may continue to use sildenafil and yeast continue to take simvastatin at 80 mg.  Pleasure to see him again we wish him well during the wintertime and hope to see him again in the spring.  Do you feel safe in your environment? Yes    Have you ever done Advance Care Planning? (For example, a Health Directive, POLST, or a discussion with a medical provider or your loved ones about your wishes):        Fall  "risk     click delete button to remove this line now  Cognitive Screening   1) Repeat 3 items (Leader, Season, Table)    2) Clock draw: NORMAL  3) 3 item recall: Recalls 1 object   Results: NORMAL clock, 1-2 items recalled: COGNITIVE IMPAIRMENT LESS LIKELY    Mini-CogTM Copyright ROSA MARIA Alegria. Licensed by the author for use in [facility]; reprinted with permission (foreign@[facility]). All rights reserved.      Do you have sleep apnea, excessive snoring or daytime drowsiness?: no    Reviewed and updated as needed this visit by clinical staff  Tobacco               Reviewed and updated as needed this visit by Provider               Social History     Tobacco Use     Smoking status: Never Smoker     Smokeless tobacco: Never Used   Substance Use Topics     Alcohol use: Not on file             Current providers sharing in care for this patient include:   Patient Care Team:  Luis Alfredo Bolanos MD as PCP - General (Family Practice)  Beata Rodas, PharmD as Pharmacist (Pharmacist)  Luis Alfredo Bolanso MD as Assigned PCP    The following health maintenance items are reviewed in Epic and correct as of today:  Health Maintenance Due   Topic Date Due     ANNUAL REVIEW OF HM ORDERS  Never done     COVID-19 Vaccine (1) Never done     HEPATITIS C SCREENING  Never done     AORTIC ANEURYSM SCREENING (SYSTEM ASSIGNED)  Never done     PHQ-2  01/01/2021     INFLUENZA VACCINE (1) 09/01/2021     FALL RISK ASSESSMENT  11/02/2021     MEDICARE ANNUAL WELLNESS VISIT  11/02/2021     Lab work is in process          Review of Systems  Constitutional, HEENT, cardiovascular, pulmonary, GI, , musculoskeletal, neuro, skin, endocrine and psych systems are negative, except as otherwise noted.    OBJECTIVE:   /70   Pulse 68   Ht 1.638 m (5' 4.5\")   Wt 80.3 kg (177 lb)   BMI 29.91 kg/m   Estimated body mass index is 29.91 kg/m  as calculated from the following:    Height as of this encounter: 1.638 m (5' 4.5\").    Weight as of this " "encounter: 80.3 kg (177 lb).  Physical Exam  GENERAL: healthy, alert and no distress  EYES: Eyes grossly normal to inspection, PERRL and conjunctivae and sclerae normal  HENT: ear canals and TM's normal, nose and mouth without ulcers or lesions  NECK: no adenopathy, no asymmetry, masses, or scars and thyroid normal to palpation  RESP: lungs clear to auscultation - no rales, rhonchi or wheezes  CV: regular rate and rhythm, normal S1 S2, no S3 or S4, no murmur, click or rub, no peripheral edema and peripheral pulses strong  ABDOMEN: soft, nontender, no hepatosplenomegaly, no masses and bowel sounds normal  MS: no gross musculoskeletal defects noted, no edema  SKIN: no suspicious lesions or rashes  NEURO: Normal strength and tone, mentation intact and speech normal  PSYCH: mentation appears normal, affect normal/bright  Genitalia-patient does have some deviation of his penile shaft to the patient's left; testes normal prostate normal small external hemorrhoidal tags skin tags       ASSESSMENT / PLAN:       Patient has been advised of split billing requirements and indicates understanding: Yes  COUNSELING:      Estimated body mass index is 29.91 kg/m  as calculated from the following:    Height as of this encounter: 1.638 m (5' 4.5\").    Weight as of this encounter: 80.3 kg (177 lb).        He reports that he has never smoked. He has never used smokeless tobacco.      Appropriate preventive services were discussed with this patient, including applicable screening as appropriate for cardiovascular disease, diabetes, osteopenia/osteoporosis, and glaucoma.  As appropriate for age/gender, discussed screening for colorectal cancer, prostate cancer, breast cancer, and cervical cancer. Checklist reviewing preventive services available has been given to the patient.    Reviewed patients plan of care and provided an AVS. The  carolyn Crane meets the Care Plan requirement. This Care Plan has been established and reviewed with the " Patient.    Counseling Resources:  ATP IV Guidelines  Pooled Cohorts Equation Calculator  Breast Cancer Risk Calculator  Breast Cancer: Medication to Reduce Risk  FRAX Risk Assessment  ICSI Preventive Guidelines  Dietary Guidelines for Americans, 2010  USDA's MyPlate  ASA Prophylaxis  Lung CA Screening    Luis Alfredo Bolanos MD  [facility]    Identified Health Risks:  Answers for HPI/ROS submitted by the patient on 11/17/2021  If you checked off any problems, how difficult have these problems made it for you to do your work, take care of things at home, or get along with other people?: Not difficult at all  PHQ9 TOTAL SCORE: 0

## 2021-11-18 ASSESSMENT — PATIENT HEALTH QUESTIONNAIRE - PHQ9: SUM OF ALL RESPONSES TO PHQ QUESTIONS 1-9: 0

## 2021-12-11 ENCOUNTER — HEALTH MAINTENANCE LETTER (OUTPATIENT)
Age: 71
End: 2021-12-11

## 2021-12-17 DIAGNOSIS — F52.21 ERECTILE DISORDER, GENERALIZED, MILD: ICD-10-CM

## 2021-12-17 DIAGNOSIS — E78.00 HYPERCHOLESTEREMIA: ICD-10-CM

## 2021-12-17 DIAGNOSIS — Z00.00 ROUTINE GENERAL MEDICAL EXAMINATION AT A HEALTH CARE FACILITY: ICD-10-CM

## 2021-12-20 RX ORDER — SILDENAFIL 50 MG/1
TABLET, FILM COATED ORAL
Qty: 18 TABLET | Refills: 3 | OUTPATIENT
Start: 2021-12-20

## 2022-04-29 ENCOUNTER — TELEPHONE (OUTPATIENT)
Dept: FAMILY MEDICINE | Facility: CLINIC | Age: 72
End: 2022-04-29
Payer: COMMERCIAL

## 2022-04-29 NOTE — TELEPHONE ENCOUNTER
If his symptoms are not improving over the weekend then I would recommend that he schedule an appointment for Monday or Tuesday to be evaluated. He may need a chest xray or discuss treatment for a sinus infection if persisting beyond 10 days.

## 2022-04-29 NOTE — TELEPHONE ENCOUNTER
Reason for call:  Patient reporting a symptom    Symptom or request: Cold    Duration (how long have symptoms been present): 7 days     Have you been treated for this before? N/A    Additional comments: Pt is callling stating that he has been having a sore throat/mouth and congestion for the past week now. Pt took covid test two days ago and came back negative.     Pt states no fever but on and off chills, phlegm started off white in color but turned green to dark. Has been taking OTC medications (mucinex and delsym) to help with the discomfort of his symptoms but not clearing up. Please advise patient what the MD recommendations are.     Phone Number patient can be reached at:  Home number on file [phone] (home)    Best Time:  Anytime     Can we leave a detailed message on this number:  YES    Call taken on 4/29/2022 at 9:16 AM by Cheryl Dawkins

## 2022-05-03 ENCOUNTER — OFFICE VISIT (OUTPATIENT)
Dept: FAMILY MEDICINE | Facility: CLINIC | Age: 72
End: 2022-05-03
Payer: COMMERCIAL

## 2022-05-03 VITALS
TEMPERATURE: 100.3 F | HEART RATE: 93 BPM | DIASTOLIC BLOOD PRESSURE: 78 MMHG | SYSTOLIC BLOOD PRESSURE: 124 MMHG | OXYGEN SATURATION: 94 %

## 2022-05-03 DIAGNOSIS — R22.0 TONGUE SWELLING: ICD-10-CM

## 2022-05-03 DIAGNOSIS — J20.9 ACUTE BRONCHITIS, UNSPECIFIED ORGANISM: Primary | ICD-10-CM

## 2022-05-03 LAB
ERYTHROCYTE [DISTWIDTH] IN BLOOD BY AUTOMATED COUNT: 13 % (ref 10–15)
HCT VFR BLD AUTO: 40.1 % (ref 40–53)
HGB BLD-MCNC: 13.8 G/DL (ref 13.3–17.7)
MCH RBC QN AUTO: 31.9 PG (ref 26.5–33)
MCHC RBC AUTO-ENTMCNC: 34.4 G/DL (ref 31.5–36.5)
MCV RBC AUTO: 93 FL (ref 78–100)
PLATELET # BLD AUTO: 242 10E3/UL (ref 150–450)
RBC # BLD AUTO: 4.33 10E6/UL (ref 4.4–5.9)
VIT B12 SERPL-MCNC: 631 PG/ML (ref 213–816)
WBC # BLD AUTO: 12.9 10E3/UL (ref 4–11)

## 2022-05-03 PROCEDURE — 82607 VITAMIN B-12: CPT | Performed by: FAMILY MEDICINE

## 2022-05-03 PROCEDURE — 99214 OFFICE O/P EST MOD 30 MIN: CPT | Performed by: FAMILY MEDICINE

## 2022-05-03 PROCEDURE — 36415 COLL VENOUS BLD VENIPUNCTURE: CPT | Performed by: FAMILY MEDICINE

## 2022-05-03 PROCEDURE — 85027 COMPLETE CBC AUTOMATED: CPT | Performed by: FAMILY MEDICINE

## 2022-05-03 RX ORDER — DOXYCYCLINE 100 MG/1
100 CAPSULE ORAL 2 TIMES DAILY
Qty: 20 CAPSULE | Refills: 0 | Status: SHIPPED | OUTPATIENT
Start: 2022-05-03 | End: 2022-06-09

## 2022-05-03 RX ORDER — BENZONATATE 100 MG/1
100 CAPSULE ORAL 3 TIMES DAILY PRN
Qty: 30 CAPSULE | Refills: 1 | Status: SHIPPED | OUTPATIENT
Start: 2022-05-03 | End: 2022-05-19

## 2022-05-03 RX ORDER — NYSTATIN 100000/ML
500000 SUSPENSION, ORAL (FINAL DOSE FORM) ORAL 3 TIMES DAILY
Qty: 150 ML | Refills: 0 | Status: SHIPPED | OUTPATIENT
Start: 2022-05-03 | End: 2022-05-11

## 2022-05-03 RX ORDER — GUAIFENESIN AND DEXTROMETHORPHAN HYDROBROMIDE 600; 30 MG/1; MG/1
1 TABLET, EXTENDED RELEASE ORAL EVERY 12 HOURS
COMMUNITY
End: 2023-11-01

## 2022-05-03 NOTE — PROGRESS NOTES
Assessment & Plan     Acute bronchitis, unspecified organism  71-year-old gentleman with a 10-day history of worsening cough and fatigue.  Recent chest x-ray, COVID testing, and influenza testing were all negative.  I suspect this is due to an acute bronchitis.  He is prescribed doxycycline twice daily for 10 days.  He is given Tessalon Perles to manage his coughing symptoms.  Would expect improvement over the next several days.  - doxycycline monohydrate (MONODOX) 100 MG capsule  Dispense: 20 capsule; Refill: 0  - nystatin (MYCOSTATIN) 360171 UNIT/ML suspension  Dispense: 150 mL; Refill: 0  - benzonatate (TESSALON) 100 MG capsule  Dispense: 30 capsule; Refill: 1    Tongue swelling  Mild tenderness and mild erythema.  May be a thrush versus irritation from his mouthguard.  Also discussed the possibility of low vitamin B12 levels contributing.  Will check CBC and B12 and prescribed nystatin swish and spit.  - CBC with platelets  - Vitamin B12  - CBC with platelets  - Vitamin B12      Kianna Bonner MD  [facility]    Ponce Crane is a 71 year old who presents for the following health issues     Cough x1 week.  Started Mucinex on Wednesday. Worsened so was seen on Saturday. COVID neg, normal chest, neg influenza.  They also did a cardiac work-up and work-up for PE all of which were negative.  His wife states he has not been the second very long time.  He did have COVID back in November and was not that sick then.  No specific exposures that they are aware of.  He is not having any chest pain.  He does have decreased exercise endurance but no chest pain with exertion.  They have not noticed any wheezing.  No history of asthma or allergies.  He is not a smoker.       Review of Systems      Constitutional, HEENT, cardiovascular, pulmonary, gi and gu systems are negative, except as otherwise noted.      Objective    /78 (BP Location: Right arm, Patient Position: Sitting, Cuff Size:  Pt asking for Nicotine gum. Nicotine patch discontinued and new order received for nicotine gum prn. Adult Regular)   Pulse 93   Temp 100.3  F (37.9  C)   SpO2 94%   There is no height or weight on file to calculate BMI.  Physical Exam   GENERAL: healthy, alert and no distress  NECK: no adenopathy, no asymmetry, masses, or scars and thyroid normal to palpation  RESP: Few expiratory wheezes and rhonchi at bases  CV: regular rate and rhythm, normal S1 S2, no S3 or S4, no murmur, click or rub, no peripheral edema and peripheral pulses strong  ABDOMEN: soft, nontender, no hepatosplenomegaly, no masses and bowel sounds normal  MS: no gross musculoskeletal defects noted, no edema    Results for orders placed or performed in visit on 05/03/22 (from the past 24 hour(s))   CBC with platelets   Result Value Ref Range    WBC Count 12.9 (H) 4.0 - 11.0 10e3/uL    RBC Count 4.33 (L) 4.40 - 5.90 10e6/uL    Hemoglobin 13.8 13.3 - 17.7 g/dL    Hematocrit 40.1 40.0 - 53.0 %    MCV 93 78 - 100 fL    MCH 31.9 26.5 - 33.0 pg    MCHC 34.4 31.5 - 36.5 g/dL    RDW 13.0 10.0 - 15.0 %    Platelet Count 242 150 - 450 10e3/uL

## 2022-05-04 NOTE — TELEPHONE ENCOUNTER
Left message to return call. When patient returns call please relay message below. Please help patient schedule appt (can use same day or acute slots) if he is not feeling better.   Thanks,  Marla FARIAS RN

## 2022-05-11 DIAGNOSIS — J20.9 ACUTE BRONCHITIS, UNSPECIFIED ORGANISM: ICD-10-CM

## 2022-05-11 RX ORDER — NYSTATIN 100000/ML
500000 SUSPENSION, ORAL (FINAL DOSE FORM) ORAL 3 TIMES DAILY
Qty: 150 ML | Refills: 0 | Status: SHIPPED | OUTPATIENT
Start: 2022-05-11 | End: 2023-11-01

## 2022-05-19 DIAGNOSIS — J20.9 ACUTE BRONCHITIS, UNSPECIFIED ORGANISM: ICD-10-CM

## 2022-05-19 RX ORDER — DOXYCYCLINE 100 MG/1
100 CAPSULE ORAL 2 TIMES DAILY
Qty: 20 CAPSULE | Refills: 0 | Status: SHIPPED | OUTPATIENT
Start: 2022-05-19 | End: 2022-05-29

## 2022-05-19 RX ORDER — BENZONATATE 100 MG/1
100 CAPSULE ORAL 3 TIMES DAILY PRN
Qty: 30 CAPSULE | Refills: 1 | Status: SHIPPED | OUTPATIENT
Start: 2022-05-19 | End: 2022-06-06

## 2022-05-19 NOTE — TELEPHONE ENCOUNTER
Patient calls to report that his Bronchitis is not cleared up yet. He would like refills.     Please advise.

## 2022-06-06 ENCOUNTER — TELEPHONE (OUTPATIENT)
Dept: NURSING | Facility: CLINIC | Age: 72
End: 2022-06-06
Payer: COMMERCIAL

## 2022-06-06 DIAGNOSIS — J20.9 ACUTE BRONCHITIS, UNSPECIFIED ORGANISM: ICD-10-CM

## 2022-06-06 RX ORDER — BENZONATATE 100 MG/1
100 CAPSULE ORAL 3 TIMES DAILY PRN
Qty: 30 CAPSULE | Refills: 0 | Status: SHIPPED | OUTPATIENT
Start: 2022-06-06 | End: 2023-11-01

## 2022-06-06 NOTE — TELEPHONE ENCOUNTER
A user error has taken place: encounter opened in error, closed for administrative reasons

## 2022-06-09 ENCOUNTER — ANCILLARY PROCEDURE (OUTPATIENT)
Dept: GENERAL RADIOLOGY | Facility: CLINIC | Age: 72
End: 2022-06-09
Attending: FAMILY MEDICINE
Payer: COMMERCIAL

## 2022-06-09 ENCOUNTER — OFFICE VISIT (OUTPATIENT)
Dept: FAMILY MEDICINE | Facility: CLINIC | Age: 72
End: 2022-06-09

## 2022-06-09 VITALS
WEIGHT: 171.3 LBS | HEART RATE: 83 BPM | SYSTOLIC BLOOD PRESSURE: 114 MMHG | TEMPERATURE: 97.8 F | BODY MASS INDEX: 28.95 KG/M2 | DIASTOLIC BLOOD PRESSURE: 66 MMHG | OXYGEN SATURATION: 94 %

## 2022-06-09 DIAGNOSIS — R05.8 RECURRENT COUGH: ICD-10-CM

## 2022-06-09 DIAGNOSIS — R05.8 RECURRENT COUGH: Primary | ICD-10-CM

## 2022-06-09 DIAGNOSIS — N18.31 CHRONIC KIDNEY DISEASE, STAGE 3A (H): ICD-10-CM

## 2022-06-09 DIAGNOSIS — N64.4 BREAST PAIN: ICD-10-CM

## 2022-06-09 PROCEDURE — 71046 X-RAY EXAM CHEST 2 VIEWS: CPT | Mod: TC | Performed by: RADIOLOGY

## 2022-06-09 PROCEDURE — 99214 OFFICE O/P EST MOD 30 MIN: CPT | Performed by: FAMILY MEDICINE

## 2022-06-09 RX ORDER — ALBUTEROL SULFATE 90 UG/1
2 AEROSOL, METERED RESPIRATORY (INHALATION) EVERY 4 HOURS PRN
Qty: 18 G | Refills: 1 | Status: SHIPPED | OUTPATIENT
Start: 2022-06-09 | End: 2023-11-01

## 2022-06-09 RX ORDER — LEVOFLOXACIN 750 MG/1
750 TABLET, FILM COATED ORAL DAILY
Qty: 5 TABLET | Refills: 0 | Status: SHIPPED | OUTPATIENT
Start: 2022-06-09 | End: 2022-06-14

## 2022-06-09 NOTE — PROGRESS NOTES
Assessment & Plan     Recurrent cough  Improved with doxycycline however has returned. No clear evidence of pneumonia on xray despite response to antibiotic. Empiric trial of inhaler and levofloxacin.  Consider CT if symptoms persist  - XR Chest 2 Views  - albuterol (PROAIR HFA/PROVENTIL HFA/VENTOLIN HFA) 108 (90 Base) MCG/ACT inhaler  Dispense: 18 g; Refill: 1  - levofloxacin (LEVAQUIN) 750 MG tablet  Dispense: 5 tablet; Refill: 0    Chronic kidney disease, stage 3a (H)  stable    Breast pain  Focal left breast pain  At 1200, no focal mass.  Referred for imaging.  - US Breast Left Limited 1-3 Quadrants      Kianna Bonner MD  [facility] is a 71 year old who presents for the following health issues     Chronic cough for several months. Improved with antibiotics but always returned.    No fevers, chills or weight loss  History of melanoma left back  Left breast tender for 3 weeks, feeling lump  Chewed for 20 years. Only occ cigs         Objective    /66 (BP Location: Left arm, Patient Position: Sitting, Cuff Size: Adult Regular)   Pulse 83   Temp 97.8  F (36.6  C) (Temporal)   Wt 77.7 kg (171 lb 4.8 oz)   SpO2 94%   BMI 28.95 kg/m    Body mass index is 28.95 kg/m .  Physical Exam   GENERAL: healthy, alert and no distress  NECK: no adenopathy, no asymmetry, masses, or scars and thyroid normal to palpation  RESP: lungs clear to auscultation - no rales, rhonchi or wheezes  CV: regular rate and rhythm, normal S1 S2, no S3 or S4, no murmur, click or rub, no peripheral edema and peripheral pulses strong  ABDOMEN: soft, nontender, no hepatosplenomegaly, no masses and bowel sounds normal  MS: no gross musculoskeletal defects noted, no edema    CXR - Reviewed and interpreted by me Normal- no infiltrates, effusions, pneumothoraces, cardiomegaly or masses

## 2022-06-22 ENCOUNTER — ANCILLARY PROCEDURE (OUTPATIENT)
Dept: MAMMOGRAPHY | Facility: CLINIC | Age: 72
End: 2022-06-22
Attending: FAMILY MEDICINE
Payer: COMMERCIAL

## 2022-06-22 DIAGNOSIS — N64.4 BREAST PAIN: ICD-10-CM

## 2022-06-22 PROCEDURE — 77062 BREAST TOMOSYNTHESIS BI: CPT

## 2022-06-22 PROCEDURE — 76642 ULTRASOUND BREAST LIMITED: CPT | Mod: LT

## 2022-07-14 ENCOUNTER — TELEPHONE (OUTPATIENT)
Dept: FAMILY MEDICINE | Facility: CLINIC | Age: 72
End: 2022-07-14

## 2022-07-14 NOTE — TELEPHONE ENCOUNTER
Dr. Bolanos thinks that [facility] would be a good place to consult with. But is willing to see patient next week if he would like to speak with Dr. Bolanos first regarding a plan.

## 2022-07-14 NOTE — TELEPHONE ENCOUNTER
Reason for Call:  Other: Further check of health     Detailed comments: Pt calling today wondering if he should have any further check of his health and thought if he should see someone at [facility].    Pt stated that Dr. Bolanos should know what pt is talking about.       Phone Number Patient can be reached at: Home number on file [phone] (home)    Best Time: Any time    Can we leave a detailed message on this number? YES    Call taken on 7/14/2022 at 9:50 AM by Caitlyn Sol

## 2022-07-20 ENCOUNTER — OFFICE VISIT (OUTPATIENT)
Dept: FAMILY MEDICINE | Facility: CLINIC | Age: 72
End: 2022-07-20
Payer: COMMERCIAL

## 2022-07-20 VITALS
HEIGHT: 64 IN | TEMPERATURE: 98.6 F | SYSTOLIC BLOOD PRESSURE: 120 MMHG | DIASTOLIC BLOOD PRESSURE: 68 MMHG | HEART RATE: 73 BPM | BODY MASS INDEX: 28.85 KG/M2 | WEIGHT: 169 LBS | OXYGEN SATURATION: 98 % | RESPIRATION RATE: 18 BRPM

## 2022-07-20 DIAGNOSIS — R05.9 COUGH: ICD-10-CM

## 2022-07-20 DIAGNOSIS — E78.00 HYPERCHOLESTEREMIA: Primary | ICD-10-CM

## 2022-07-20 PROCEDURE — 99214 OFFICE O/P EST MOD 30 MIN: CPT | Performed by: FAMILY MEDICINE

## 2022-07-20 ASSESSMENT — PAIN SCALES - GENERAL: PAINLEVEL: NO PAIN (0)

## 2022-07-20 NOTE — PROGRESS NOTES
"  Assessment & Plan     Hypercholesteremia  Patient is to continue with his statin medication; he is; is getting a little more short of breath and we think he should self refer himself back to [facility] cardiology for definitive angiography; from history CT angiogram showed a hint of 60 to 70 degrees stenosis in one of his coronary arteries.  Currently is asymptomatic    Cough  Cough cleared after last treatment for his oral yeast infection following an upper respiratory infection that was difficult to clear               BMI:   Estimated body mass index is 28.57 kg/m  as calculated from the following:    Height as of this encounter: 1.638 m (5' 4.49\").    Weight as of this encounter: 76.7 kg (169 lb).           No follow-ups on file.    Luis Alfredo Bolanos MD  [facility]    Ponce Crane is a 71 year old, presenting for the following health issues:  RECHECK (aorta + cholesterol)      HOLLI Crane is here to discuss his cardiovascular status.  His blood pressure is fine.  His lipids are under good control with Lipitor we encouraged him to continue that treatment.  He did have what sounds like a CT angiogram and calcium study at [facility] about 5 to 6 years ago.  He is concerned because of his family history with 2 of his brothers dying from acute coronary syndrome.  It sounds like one of his coronary arteries was a little bit stenotic and I would defer to our colleagues at [facility] to evaluate his coronary artery status possibly with definitive coronary angiography.  He states he is a little more short of breath does not get dyspneic but he is concerned and worried about his cardiovascular status.  He will call [facility] and schedule an executive health screening and also a urological referral because of some bending of his distal penis which is interfering with his intimate relations.      Review of Systems   Constitutional, HEENT, cardiovascular, pulmonary, gi and gu systems are " "negative, except as otherwise noted.      Objective    /68 (BP Location: Right arm, Patient Position: Sitting, Cuff Size: Adult Regular)   Pulse 73   Temp 98.6  F (37  C) (Oral)   Resp 18   Ht 1.638 m (5' 4.49\")   Wt 76.7 kg (169 lb)   SpO2 98%   BMI 28.57 kg/m    Body mass index is 28.57 kg/m .  Physical Exam   GENERAL: healthy, alert and no distress  NECK: no adenopathy, no asymmetry, masses, or scars and thyroid normal to palpation  RESP: lungs clear to auscultation - no rales, rhonchi or wheezes  CV: regular rate and rhythm, normal S1 S2, no S3 or S4, no murmur, click or rub, no peripheral edema and peripheral pulses strong  ABDOMEN: soft, nontender, no hepatosplenomegaly, no masses and bowel sounds normal  MS: no gross musculoskeletal defects noted, no edema      Time includes face-to-face and non-face-to-face patient care totaling 30 minutes              .  ..Answers for HPI/ROS submitted by the patient on 7/20/2022  What is the reason for your visit today? : Follow up on heart check  How many servings of fruits and vegetables do you eat daily?: 2-3  On average, how many sweetened beverages do you drink each day (Examples: soda, juice, sweet tea, etc.  Do NOT count diet or artificially sweetened beverages)?: 0  How many minutes a day do you exercise enough to make your heart beat faster?: 20 to 29  How many days a week do you exercise enough to make your heart beat faster?: 3 or less  How many days per week do you miss taking your medication?: 0      "

## 2022-09-25 ENCOUNTER — HEALTH MAINTENANCE LETTER (OUTPATIENT)
Age: 72
End: 2022-09-25

## 2022-12-06 ENCOUNTER — OFFICE VISIT (OUTPATIENT)
Dept: FAMILY MEDICINE | Facility: CLINIC | Age: 72
End: 2022-12-06
Payer: COMMERCIAL

## 2022-12-06 VITALS
WEIGHT: 178.2 LBS | SYSTOLIC BLOOD PRESSURE: 124 MMHG | DIASTOLIC BLOOD PRESSURE: 78 MMHG | HEART RATE: 72 BPM | TEMPERATURE: 98.4 F | OXYGEN SATURATION: 81 % | BODY MASS INDEX: 30.13 KG/M2

## 2022-12-06 DIAGNOSIS — Z13.220 SCREENING FOR HYPERLIPIDEMIA: ICD-10-CM

## 2022-12-06 DIAGNOSIS — E78.00 HIGH CHOLESTEROL: ICD-10-CM

## 2022-12-06 LAB
ANION GAP SERPL CALCULATED.3IONS-SCNC: 12 MMOL/L (ref 7–15)
BUN SERPL-MCNC: 20.5 MG/DL (ref 8–23)
CALCIUM SERPL-MCNC: 10 MG/DL (ref 8.8–10.2)
CHLORIDE SERPL-SCNC: 104 MMOL/L (ref 98–107)
CHOLEST SERPL-MCNC: 143 MG/DL
CREAT SERPL-MCNC: 1.17 MG/DL (ref 0.67–1.17)
DEPRECATED HCO3 PLAS-SCNC: 26 MMOL/L (ref 22–29)
GFR SERPL CREATININE-BSD FRML MDRD: 65 ML/MIN/1.73M2
GLUCOSE SERPL-MCNC: 108 MG/DL (ref 70–99)
HCV AB SERPL QL IA: NONREACTIVE
HDLC SERPL-MCNC: 60 MG/DL
LDLC SERPL CALC-MCNC: 62 MG/DL
NONHDLC SERPL-MCNC: 83 MG/DL
POTASSIUM SERPL-SCNC: 4.7 MMOL/L (ref 3.4–5.3)
SODIUM SERPL-SCNC: 142 MMOL/L (ref 136–145)
TRIGL SERPL-MCNC: 107 MG/DL

## 2022-12-06 PROCEDURE — 86803 HEPATITIS C AB TEST: CPT | Performed by: FAMILY MEDICINE

## 2022-12-06 PROCEDURE — 80048 BASIC METABOLIC PNL TOTAL CA: CPT | Performed by: FAMILY MEDICINE

## 2022-12-06 PROCEDURE — 80061 LIPID PANEL: CPT | Performed by: FAMILY MEDICINE

## 2022-12-06 PROCEDURE — 36415 COLL VENOUS BLD VENIPUNCTURE: CPT | Performed by: FAMILY MEDICINE

## 2022-12-06 PROCEDURE — 82043 UR ALBUMIN QUANTITATIVE: CPT | Performed by: FAMILY MEDICINE

## 2022-12-06 PROCEDURE — 99214 OFFICE O/P EST MOD 30 MIN: CPT | Performed by: FAMILY MEDICINE

## 2022-12-06 ASSESSMENT — PAIN SCALES - GENERAL: PAINLEVEL: NO PAIN (0)

## 2022-12-06 NOTE — PROGRESS NOTES
"  Assessment & Plan     Chronic kidney disease, stage 3a (H)    - Albumin Random Urine Quantitative with Creat Ratio  - BASIC METABOLIC PANEL  - Albumin Random Urine Quantitative with Creat Ratio  - BASIC METABOLIC PANEL      Screening for hyperlipidemia  - Lipid panel reflex to direct LDL Non-fasting  -    High cholesterol    - Lipid panel reflex to direct LDL Non-fasting  -           BMI:   Estimated body mass index is 30.13 kg/m  as calculated from the following:    Height as of 7/20/22: 1.638 m (5' 4.49\").    Weight as of this encounter: 80.8 kg (178 lb 3.2 oz).           No follow-ups on file.    Luis Alfredo Bolanos MD  [facility] RUBY Crane is a 72 year old, presenting for the following health issues:  Clinic Care Coordination - Follow-up (FROM [place] )      HPI patient is being seen here for follow-up of his lipid management; patient is on simvastatin and Zetia.  We spent some time explaining the management of total cholesterol LDLs and HDLs.  Patient is exercising aerobically 40 to 60 minutes/day which is ideal.  We did go over the results of his tests at [facility] and agree with her consultations that this is not a invasive type of indication for any further cardiovascular consultation.  Patient will follow with us here in 6 months.  He is going to winter in [place].  All medical questions asked were answered I have personally reviewed family social history surgeries allergies problems list total time spent face-to-face including dietary management review of systems was 30 minutes.          Review of Systems   Constitutional, HEENT, cardiovascular, pulmonary, gi and gu systems are negative, except as otherwise noted.      Objective    /78 (BP Location: Right arm, Patient Position: Sitting, Cuff Size: Adult Regular)   Pulse 72   Temp 98.4  F (36.9  C) (Oral)   Wt 80.8 kg (178 lb 3.2 oz)   SpO2 (!) 81%   BMI 30.13 kg/m    Body mass index is 30.13 kg/m .  Physical Exam "   GENERAL: healthy, alert and no distress  NECK: no adenopathy, no asymmetry, masses, or scars and thyroid normal to palpation  RESP: lungs clear to auscultation - no rales, rhonchi or wheezes  CV: regular rate and rhythm, normal S1 S2, no S3 or S4, no murmur, click or rub, no peripheral edema and peripheral pulses strong  ABDOMEN: soft, nontender, no hepatosplenomegaly, no masses and bowel sounds normal  MS: no gross musculoskeletal defects noted, no edema

## 2022-12-07 LAB
CREAT UR-MCNC: 122 MG/DL
MICROALBUMIN UR-MCNC: 52.3 MG/L
MICROALBUMIN/CREAT UR: 42.87 MG/G CR (ref 0–17)

## 2022-12-20 DIAGNOSIS — R07.89 ATYPICAL CHEST PAIN: ICD-10-CM

## 2022-12-21 NOTE — TELEPHONE ENCOUNTER
"Last Written Prescription Date:  11/17/21  Last Fill Quantity: 90,  # refills: 3   Last office visit provider:  12/6/22     Requested Prescriptions   Pending Prescriptions Disp Refills     omeprazole (PRILOSEC) 20 MG DR capsule [Pharmacy Med Name: OMEPRAZOLE 20MG CAPSULES] 90 capsule 3     Sig: TAKE ONE CAPSULE BY MOUTH DAILY       PPI Protocol Passed - 12/20/2022 10:00 PM        Passed - Not on Clopidogrel (unless Pantoprazole ordered)        Passed - No diagnosis of osteoporosis on record        Passed - Recent (12 mo) or future (30 days) visit within the authorizing provider's specialty     Patient has had an office visit with the authorizing provider or a provider within the authorizing providers department within the previous 12 mos or has a future within next 30 days. See \"Patient Info\" tab in inbasket, or \"Choose Columns\" in Meds & Orders section of the refill encounter.              Passed - Medication is active on med list        Passed - Patient is age 18 or older             Scarlett De La Fuente RN 12/21/22 4:41 PM  "

## 2023-01-30 DIAGNOSIS — Z00.00 ROUTINE GENERAL MEDICAL EXAMINATION AT A HEALTH CARE FACILITY: ICD-10-CM

## 2023-01-30 DIAGNOSIS — E78.00 HYPERCHOLESTEREMIA: ICD-10-CM

## 2023-01-30 DIAGNOSIS — E78.5 HYPERLIPIDEMIA LDL GOAL <100: Primary | ICD-10-CM

## 2023-01-31 RX ORDER — SIMVASTATIN 80 MG
TABLET ORAL
Qty: 90 TABLET | Refills: 2 | Status: SHIPPED | OUTPATIENT
Start: 2023-01-31 | End: 2023-11-17

## 2023-01-31 NOTE — TELEPHONE ENCOUNTER
"Please review    Last Written Prescription Date:  11/17/21  Last Fill Quantity: 90,  # refills: 3   Last office visit provider:  12/6/22     Requested Prescriptions   Pending Prescriptions Disp Refills     simvastatin (ZOCOR) 80 MG tablet [Pharmacy Med Name: SIMVASTATIN 80MG TABLETS] 90 tablet 3     Sig: TAKE 1 TABLET BY MOUTH DAILY       Statins Protocol Passed - 1/30/2023  5:24 AM        Passed - LDL on file in past 12 months     Recent Labs   Lab Test 12/06/22  1050   LDL 62             Passed - No abnormal creatine kinase in past 12 months     No lab results found.             Passed - Recent (12 mo) or future (30 days) visit within the authorizing provider's specialty     Patient has had an office visit with the authorizing provider or a provider within the authorizing providers department within the previous 12 mos or has a future within next 30 days. See \"Patient Info\" tab in inbasket, or \"Choose Columns\" in Meds & Orders section of the refill encounter.              Passed - Medication is active on med list        Passed - Patient is age 18 or older             Scarlett De La Fuente RN 01/30/23 6:53 PM  "

## 2023-02-04 ENCOUNTER — HEALTH MAINTENANCE LETTER (OUTPATIENT)
Age: 73
End: 2023-02-04

## 2023-08-29 ENCOUNTER — OFFICE VISIT (OUTPATIENT)
Dept: FAMILY MEDICINE | Facility: CLINIC | Age: 73
End: 2023-08-29
Payer: COMMERCIAL

## 2023-08-29 VITALS
TEMPERATURE: 98.1 F | WEIGHT: 177.4 LBS | BODY MASS INDEX: 30.29 KG/M2 | OXYGEN SATURATION: 96 % | RESPIRATION RATE: 18 BRPM | HEIGHT: 64 IN | DIASTOLIC BLOOD PRESSURE: 77 MMHG | HEART RATE: 66 BPM | SYSTOLIC BLOOD PRESSURE: 131 MMHG

## 2023-08-29 DIAGNOSIS — N48.6 PEYRONIE'S DISEASE: ICD-10-CM

## 2023-08-29 DIAGNOSIS — E78.00 HYPERCHOLESTEREMIA: ICD-10-CM

## 2023-08-29 DIAGNOSIS — Z00.00 ENCOUNTER FOR ANNUAL PHYSICAL EXAM: Primary | ICD-10-CM

## 2023-08-29 DIAGNOSIS — R93.1 ABNORMAL CORONARY ANGIOGRAM: ICD-10-CM

## 2023-08-29 LAB
ALBUMIN SERPL BCG-MCNC: 4.6 G/DL (ref 3.5–5.2)
ALBUMIN UR-MCNC: NEGATIVE MG/DL
ALP SERPL-CCNC: 78 U/L (ref 40–129)
ALT SERPL W P-5'-P-CCNC: 30 U/L (ref 0–70)
ANION GAP SERPL CALCULATED.3IONS-SCNC: 11 MMOL/L (ref 7–15)
APPEARANCE UR: CLEAR
AST SERPL W P-5'-P-CCNC: 28 U/L (ref 0–45)
BILIRUB SERPL-MCNC: 0.5 MG/DL
BILIRUB UR QL STRIP: NEGATIVE
BUN SERPL-MCNC: 23.2 MG/DL (ref 8–23)
CALCIUM SERPL-MCNC: 9.8 MG/DL (ref 8.8–10.2)
CHLORIDE SERPL-SCNC: 103 MMOL/L (ref 98–107)
CHOLEST SERPL-MCNC: 137 MG/DL
COLOR UR AUTO: YELLOW
CREAT SERPL-MCNC: 1.16 MG/DL (ref 0.67–1.17)
DEPRECATED HCO3 PLAS-SCNC: 26 MMOL/L (ref 22–29)
ERYTHROCYTE [DISTWIDTH] IN BLOOD BY AUTOMATED COUNT: 13.6 % (ref 10–15)
GFR SERPL CREATININE-BSD FRML MDRD: 66 ML/MIN/1.73M2
GLUCOSE SERPL-MCNC: 118 MG/DL (ref 70–99)
GLUCOSE UR STRIP-MCNC: NEGATIVE MG/DL
HBA1C MFR BLD: 5.6 % (ref 0–5.6)
HCT VFR BLD AUTO: 41.2 % (ref 40–53)
HDLC SERPL-MCNC: 53 MG/DL
HGB BLD-MCNC: 14.1 G/DL (ref 13.3–17.7)
HGB UR QL STRIP: NEGATIVE
KETONES UR STRIP-MCNC: NEGATIVE MG/DL
LDLC SERPL CALC-MCNC: 60 MG/DL
LEUKOCYTE ESTERASE UR QL STRIP: NEGATIVE
MCH RBC QN AUTO: 32.3 PG (ref 26.5–33)
MCHC RBC AUTO-ENTMCNC: 34.2 G/DL (ref 31.5–36.5)
MCV RBC AUTO: 95 FL (ref 78–100)
NITRATE UR QL: NEGATIVE
NONHDLC SERPL-MCNC: 84 MG/DL
PH UR STRIP: 7 [PH] (ref 5–8)
PLATELET # BLD AUTO: 151 10E3/UL (ref 150–450)
POTASSIUM SERPL-SCNC: 4.8 MMOL/L (ref 3.4–5.3)
PROT SERPL-MCNC: 7.1 G/DL (ref 6.4–8.3)
PSA SERPL DL<=0.01 NG/ML-MCNC: 0.9 NG/ML (ref 0–6.5)
RBC # BLD AUTO: 4.36 10E6/UL (ref 4.4–5.9)
SODIUM SERPL-SCNC: 140 MMOL/L (ref 136–145)
SP GR UR STRIP: 1.01 (ref 1–1.03)
TRIGL SERPL-MCNC: 120 MG/DL
UROBILINOGEN UR STRIP-ACNC: 0.2 E.U./DL
WBC # BLD AUTO: 8.1 10E3/UL (ref 4–11)

## 2023-08-29 PROCEDURE — 93005 ELECTROCARDIOGRAM TRACING: CPT | Performed by: FAMILY MEDICINE

## 2023-08-29 PROCEDURE — 80061 LIPID PANEL: CPT | Performed by: FAMILY MEDICINE

## 2023-08-29 PROCEDURE — 36415 COLL VENOUS BLD VENIPUNCTURE: CPT | Performed by: FAMILY MEDICINE

## 2023-08-29 PROCEDURE — G0103 PSA SCREENING: HCPCS | Performed by: FAMILY MEDICINE

## 2023-08-29 PROCEDURE — G0439 PPPS, SUBSEQ VISIT: HCPCS | Performed by: FAMILY MEDICINE

## 2023-08-29 PROCEDURE — 80053 COMPREHEN METABOLIC PANEL: CPT | Performed by: FAMILY MEDICINE

## 2023-08-29 PROCEDURE — 81003 URINALYSIS AUTO W/O SCOPE: CPT | Performed by: FAMILY MEDICINE

## 2023-08-29 PROCEDURE — 99213 OFFICE O/P EST LOW 20 MIN: CPT | Mod: 25 | Performed by: FAMILY MEDICINE

## 2023-08-29 PROCEDURE — 85027 COMPLETE CBC AUTOMATED: CPT | Performed by: FAMILY MEDICINE

## 2023-08-29 PROCEDURE — 93010 ELECTROCARDIOGRAM REPORT: CPT | Performed by: INTERNAL MEDICINE

## 2023-08-29 PROCEDURE — 83036 HEMOGLOBIN GLYCOSYLATED A1C: CPT | Performed by: FAMILY MEDICINE

## 2023-08-29 RX ORDER — EZETIMIBE 10 MG/1
TABLET ORAL
COMMUNITY
Start: 2023-07-19 | End: 2023-10-26

## 2023-08-29 ASSESSMENT — ENCOUNTER SYMPTOMS
CONSTIPATION: 1
DIZZINESS: 0
PARESTHESIAS: 0
SORE THROAT: 0
WEAKNESS: 0
NAUSEA: 0
HEMATURIA: 0
PALPITATIONS: 0
MYALGIAS: 1
EYE PAIN: 0
NERVOUS/ANXIOUS: 0
DIARRHEA: 0
ARTHRALGIAS: 1
HEARTBURN: 0
FREQUENCY: 0
DYSURIA: 0
FEVER: 0
CHILLS: 0
JOINT SWELLING: 0
HEMATOCHEZIA: 0
HEADACHES: 0
SHORTNESS OF BREATH: 1
COUGH: 0
ABDOMINAL PAIN: 0

## 2023-08-29 ASSESSMENT — PAIN SCALES - GENERAL: PAINLEVEL: NO PAIN (0)

## 2023-08-29 ASSESSMENT — ACTIVITIES OF DAILY LIVING (ADL): CURRENT_FUNCTION: NO ASSISTANCE NEEDED

## 2023-08-29 NOTE — PROGRESS NOTES
"SUBJECTIVE:   Royce is a 72 year old who presents for Preventive Visit.      8/29/2023     7:01 AM   Additional Questions   Roomed by Coreen     Chief complaint I am here for my physical; I have Peyronie's disease I need to see a urologist; requesting to see a cardiologist because of my abnormal test in the past  Are you in the first 12 months of your Medicare coverage?  No    Healthy Habits:     In general, how would you rate your overall health?  Excellent    Frequency of exercise:  2-3 days/week    Duration of exercise:  30-45 minutes    Do you usually eat at least 4 servings of fruit and vegetables a day, include whole grains    & fiber and avoid regularly eating high fat or \"junk\" foods?  Yes    Taking medications regularly:  Yes    Medication side effects:  None    Ability to successfully perform activities of daily living:  No assistance needed    Home Safety:  No safety concerns identified    Hearing Impairment:  No hearing concerns    In the past 6 months, have you been bothered by leaking of urine?  No    In general, how would you rate your overall mental or emotional health?  Excellent    Additional concerns today:  No        Have you ever done Advance Care Planning? (For example, a Health Directive, POLST, or a discussion with a medical provider or your loved ones about your wishes): Yes, patient states has an Advance Care Planning document and will bring a copy to the clinic.       Fall risk  Fallen 2 or more times in the past year?: No  Any fall with injury in the past year?: No    Cognitive Screening   1) Repeat 3 items (Leader, Season, Table)    2) Clock draw: NORMAL  3) 3 item recall: Recalls 3 objects  Results: 3 items recalled: COGNITIVE IMPAIRMENT LESS LIKELY    Mini-CogTM Copyright S [employer]. Licensed by the author for use in [facility]; reprinted with permission (foreign@.[facility]). All rights reserved.          Reviewed and updated as needed this visit by clinical staff   Tobacco   Meds "              Reviewed and updated as needed this visit by Provider                 Social History     Tobacco Use     Smoking status: Never     Smokeless tobacco: Never   Substance Use Topics     Alcohol use: Not on file             8/29/2023     7:00 AM   Alcohol Use   Prescreen: >3 drinks/day or >7 drinks/week? No     Do you have a current opioid prescription? No  Do you use any other controlled substances or medications that are not prescribed by a provider? None              Current providers sharing in care for this patient include:   Patient Care Team:  Luis Alfredo Bolanos MD as PCP - General (Family Practice)  Beata Adams PharmD as Pharmacist (Pharmacist)  Luis Alfredo Bolanos MD as Assigned PCP    The following health maintenance items are reviewed in Epic and correct as of today:  Health Maintenance   Topic Date Due     AORTIC ANEURYSM SCREENING (SYSTEM ASSIGNED)  Never done     COVID-19 Vaccine (3 - Booster for Love series) 01/12/2022     MEDICARE ANNUAL WELLNESS VISIT  11/17/2022     ANNUAL REVIEW OF HM ORDERS  06/09/2023     HEMOGLOBIN  05/03/2023     INFLUENZA VACCINE (1) 09/01/2023     BMP  12/06/2023     LIPID  12/06/2023     MICROALBUMIN  12/06/2023     FALL RISK ASSESSMENT  08/29/2024     ADVANCE CARE PLANNING  11/02/2025     COLORECTAL CANCER SCREENING  12/17/2025     DTAP/TDAP/TD IMMUNIZATION (3 - Td or Tdap) 09/25/2029     HEPATITIS C SCREENING  Completed     PHQ-2 (once per calendar year)  Completed     Pneumococcal Vaccine: 65+ Years  Completed     URINALYSIS  Completed     ZOSTER IMMUNIZATION  Completed     IPV IMMUNIZATION  Aged Out     MENINGITIS IMMUNIZATION  Aged Out     Lab work is in process    History of present illness and assessment: Royce has been under my care for decades.  He presents for his annual physical exam new complaints include he is having pain with intercourse and has curvature of his penis and is requesting a consult for Peyronie's disease.  This will be placed.  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  He exercises almost on a daily basis walks at least 10,000 steps per day.  He is got a past medical history of EDMAR well treated and documented with CPAP.  His ejection fraction was slightly off over the last few tests he has had CT angiogram which shows 70% stenosis of 1 vessel.  He is requesting to see Dr. Zhou and cardiology.  He previously was taken care of by the [facility] cardiology people but he like to stay local.  He has no urinary tract symptoms slight prostatism in the past but this is improved over the last year.  His weight has been stable all medical questions asked were answered I have personally reviewed family social history surgeries allergies problems list nice to see him again we will follow along with our consultants      Review of Systems   Constitutional:  Negative for chills and fever.   HENT:  Positive for hearing loss. Negative for congestion, ear pain and sore throat.    Eyes:  Negative for pain and visual disturbance.   Respiratory:  Positive for shortness of breath. Negative for cough.    Cardiovascular:  Negative for chest pain, palpitations and peripheral edema.   Gastrointestinal:  Positive for constipation. Negative for abdominal pain, diarrhea, heartburn, hematochezia and nausea.   Genitourinary:  Positive for impotence. Negative for dysuria, frequency, genital sores, hematuria, penile discharge and urgency.   Musculoskeletal:  Positive for arthralgias and myalgias. Negative for joint swelling.   Skin:  Negative for rash.   Neurological:  Negative for dizziness, weakness, headaches and paresthesias.   Psychiatric/Behavioral:  Negative for mood changes. The patient is not nervous/anxious.      Constitutional, HEENT, cardiovascular, pulmonary, GI, , musculoskeletal, neuro, skin, endocrine and psych systems are negative, except as otherwise  "noted.    OBJECTIVE:   /77 (BP Location: Right arm, Patient Position: Sitting, Cuff Size: Adult Large)   Pulse 66   Temp 98.1  F (36.7  C) (Oral)   Resp 18   Ht 1.631 m (5' 4.21\")   Wt 80.5 kg (177 lb 6.4 oz)   SpO2 96%   BMI 30.25 kg/m   Estimated body mass index is 30.25 kg/m  as calculated from the following:    Height as of this encounter: 1.631 m (5' 4.21\").    Weight as of this encounter: 80.5 kg (177 lb 6.4 oz).  Physical Exam  GENERAL: healthy, alert and no distress  EYES: Eyes grossly normal to inspection, PERRL and conjunctivae and sclerae normal  HENT: ear canals and TM's normal, nose and mouth without ulcers or lesions  NECK: no adenopathy, no asymmetry, masses, or scars and thyroid normal to palpation  RESP: lungs clear to auscultation - no rales, rhonchi or wheezes  CV: regular rate and rhythm, normal S1 S2, no S3 or S4, no murmur, click or rub, no peripheral edema and peripheral pulses strong  ABDOMEN: soft, nontender, no hepatosplenomegaly, no masses and bowel sounds normal  MS: no gross musculoskeletal defects noted, no edema  SKIN: no suspicious lesions or rashes  NEURO: Normal strength and tone, mentation intact and speech normal  PSYCH: mentation appears normal, affect normal/bright  Prostate exam was normal  Diagnostic Test Results:  Labs reviewed in Epic    ASSESSMENT / PLAN:       ICD-10-CM    1. Encounter for annual physical exam  Z00.00 EKG 12-lead, tracing only     PSA, screen     CBC with platelets     Comprehensive metabolic panel     EKG 12-lead, tracing only     HEMOGLOBIN A1C     Lipid panel reflex to direct LDL Fasting     Prostate Specific Antigen Screen     UA Macroscopic with reflex to Microscopic and Culture      2. Hypercholesteremia  E78.00 Adult Cardiology Eval  Referral     Comprehensive metabolic panel     Lipid panel reflex to direct LDL Fasting      3. Peyronie's disease  N48.6 Adult Urology  Referral      4. Abnormal coronary angiogram  R93.1 " "          Patient has been advised of split billing requirements and indicates understanding: Yes      COUNSELING:  Reviewed preventive health counseling, as reflected in patient instructions       Regular exercise      BMI:   Estimated body mass index is 30.25 kg/m  as calculated from the following:    Height as of this encounter: 1.631 m (5' 4.21\").    Weight as of this encounter: 80.5 kg (177 lb 6.4 oz).         He reports that he has never smoked. He has never used smokeless tobacco.      Appropriate preventive services were discussed with this patient, including applicable screening as appropriate for cardiovascular disease, diabetes, osteopenia/osteoporosis, and glaucoma.  As appropriate for age/gender, discussed screening for colorectal cancer, prostate cancer, breast cancer, and cervical cancer. Checklist reviewing preventive services available has been given to the patient.    Reviewed patients plan of care and provided an AVS. The Basic Care Plan (routine screening as documented in Health Maintenance) for Royce meets the Care Plan requirement. This Care Plan has been established and reviewed with the Patient.          Luis Alfredo Bolanos MD  [facility]    Identified Health Risks:    "

## 2023-08-30 LAB
ATRIAL RATE - MUSE: 64 BPM
DIASTOLIC BLOOD PRESSURE - MUSE: NORMAL MMHG
INTERPRETATION ECG - MUSE: NORMAL
P AXIS - MUSE: 0 DEGREES
PR INTERVAL - MUSE: 140 MS
QRS DURATION - MUSE: 78 MS
QT - MUSE: 396 MS
QTC - MUSE: 408 MS
R AXIS - MUSE: 25 DEGREES
SYSTOLIC BLOOD PRESSURE - MUSE: NORMAL MMHG
T AXIS - MUSE: 2 DEGREES
VENTRICULAR RATE- MUSE: 64 BPM

## 2023-10-06 ENCOUNTER — TELEPHONE (OUTPATIENT)
Dept: FAMILY MEDICINE | Facility: CLINIC | Age: 73
End: 2023-10-06
Payer: COMMERCIAL

## 2023-10-06 DIAGNOSIS — G47.33 OSA (OBSTRUCTIVE SLEEP APNEA): Primary | ICD-10-CM

## 2023-10-06 NOTE — TELEPHONE ENCOUNTER
Pt is calling and asking for a prescription for a CPAP Machine to be sent to [facility] Phone Number [phone]   Would like a call back regarding this    Thanks    Marianne Felix MA

## 2023-10-09 NOTE — TELEPHONE ENCOUNTER
I placed a call to [facility] and spoke to a representative. They state that the patient's sleep study is greater than 10 years. Insurance requires that patient complete a new study before a new CPAP will be approved.     Please sign order for sleep study.

## 2023-10-26 DIAGNOSIS — E78.00 HYPERCHOLESTEREMIA: Primary | ICD-10-CM

## 2023-10-26 RX ORDER — EZETIMIBE 10 MG/1
TABLET ORAL
Qty: 90 TABLET | Refills: 3 | Status: ON HOLD | OUTPATIENT
Start: 2023-10-26 | End: 2023-12-06

## 2023-10-26 NOTE — TELEPHONE ENCOUNTER
Pending Prescriptions:                       Disp   Refills    ezetimibe (ZETIA) 10 MG tablet            90 tab*3

## 2023-11-01 ENCOUNTER — OFFICE VISIT (OUTPATIENT)
Dept: CARDIOLOGY | Facility: CLINIC | Age: 73
End: 2023-11-01
Attending: FAMILY MEDICINE
Payer: COMMERCIAL

## 2023-11-01 VITALS
DIASTOLIC BLOOD PRESSURE: 62 MMHG | WEIGHT: 176 LBS | HEART RATE: 81 BPM | SYSTOLIC BLOOD PRESSURE: 116 MMHG | RESPIRATION RATE: 18 BRPM | BODY MASS INDEX: 30.01 KG/M2 | OXYGEN SATURATION: 96 %

## 2023-11-01 DIAGNOSIS — R07.2 PRECORDIAL PAIN: ICD-10-CM

## 2023-11-01 DIAGNOSIS — E78.00 HYPERCHOLESTEREMIA: ICD-10-CM

## 2023-11-01 DIAGNOSIS — I25.119 CORONARY ARTERY DISEASE INVOLVING NATIVE CORONARY ARTERY OF NATIVE HEART WITH ANGINA PECTORIS (H): Primary | ICD-10-CM

## 2023-11-01 PROCEDURE — 99204 OFFICE O/P NEW MOD 45 MIN: CPT | Performed by: INTERNAL MEDICINE

## 2023-11-01 NOTE — H&P (VIEW-ONLY)
Thank you, Dr. Bolanos, for asking [facility] to evaluate Mr. Royce Feliz.      Assessment/Recommendations   Assessment:    Coronary artery disease, multivessel(70 to 99% proximal RCA and 50% left main by CT coronary angiogram 2018), possible anginal equivalents/exertional dyspnea  Hypercholesterolemia, satisfactory control on combination of high-dose simvastatin and Zetia  Chronic low back pain    Plan:  CT coronary angiogram to reassess severity of coronary artery disease.  I am a bit skeptical about doing stress test for the patients with  left main disease.  May consider revascularization if severity of left main disease progressed  I encouraged him to continue with current medications and physical activities    Follow-up based on the results of the CT       History of Present Illness    Mr. Royce Feliz is a 72 year old male who comes in for initial cardiac evaluation.  He has a history of coronary artery disease with known 70 to 90% RCA and 50% left main stenosis by CT coronary angiogram in 2018.  He has follow-up with cardiology at [facility].  The last functional testing was in 2022 at which time he had no evidence of significant ischemia.  He does not have classical angina.  He does complain of shortness of breath with physical activity although he continues to work full-time with some physical involvement.  He works out regularly.  He has not had weight gain, PND, orthopnea.  He denies heart palpitations syncope.  He has been compliant with cholesterol-lowering medications    ECG: Personally reviewed.  8/21/2023 normal sinus rhythm within normal limits.      Cardiopulmonary stress test October 2022 at [facility]    1. Exercise echocardiogram negative for myocardial ischemia.   2. The patient's exercise capacity was below average, the patient achieved a workload of 8 METS and 89% FAC.   3. Ejection fraction response from 55% at rest to 70% at peak stress, left  ventricular end-systolic volume decreased with stress.    consistent with normal left ventricular filling pressure at rest and with exercise.   5. Findings consistent with normal lung aeration at rest and with exercise.   6. The stress ECG was negative for ischemia.   Coronary Angiogram:  June 2018 at [facility]  1. Severe mixed atherosclerotic coronary artery disease, with 50% stenosis of   the distal left main, as well as  discrete 70-99% lesions in the proximal RCA   and mid LAD; CAD-RADS = 4.   2. Normal left ventricular size and function, abnormal septal motion without   other regional wall motion abnormalities.   3. Mild right ventricular enlargement with mild to moderate reduced right   ventricular systolic function   4. Normal caliber thoracic aorta.          Physical Examination Review of Systems   /62 (BP Location: Left arm, Patient Position: Sitting, Cuff Size: Adult Regular)   Pulse 81   Resp 18   Wt 79.8 kg (176 lb)   SpO2 96%   BMI 30.01 kg/m    Body mass index is 30.01 kg/m .  Wt Readings from Last 3 Encounters:   11/01/23 79.8 kg (176 lb)   08/29/23 80.5 kg (177 lb 6.4 oz)   12/06/22 80.8 kg (178 lb 3.2 oz)     General Appearance:   Alert, cooperative, no distress, appears stated age   Head/ENT: Normocephalic, without obvious abnormality. Membranes moist      EYES:  no scleral icterus, normal conjunctivae   Neck: Supple, symmetrical, trachea midline, no adenopathy, thyroid: not enlarged, symmetric, no carotid bruit or JVD   Chest/Lungs:   Lungs are clear to auscultation, respirations unlabored. No tenderness or deformity    Cardiovascular:   Regular rhythm, S1, S2 normal, no murmur, rub or gallop.   Abdomen:  Soft, non-tender, bowel sounds active all four quadrants,  no masses, no organomegaly   Extremities: no cyanosis or clubbing. No edema   Skin: Skin color, texture, turgor normal, no rashes or lesions.    Psychiatric: Normal affect, calm   Neurologic: Alert and oriented x 3, moving  "all four extremities.     Enc Vitals  BP: 116/62  Pulse: 81  Resp: 18  SpO2: 96 %  Weight: 79.8 kg (176 lb) (Shoes on)                                          Lab Results    Chemistry/lipid CBC Cardiac Enzymes/BNP/TSH/INR   Recent Labs   Lab Test 23  0800   CHOL 137   HDL 53   LDL 60   TRIG 120     Recent Labs   Lab Test 23  0800 22  1050 21  0904   LDL 60 62 82     Recent Labs   Lab Test 23  0800      POTASSIUM 4.8   CHLORIDE 103   CO2 26   *   BUN 23.2*   CR 1.16   GFRESTIMATED 66   GADIEL 9.8     Recent Labs   Lab Test 23  0800 22  1050 21  0904   CR 1.16 1.17 1.21     Recent Labs   Lab Test 23  0800 21  0904 10/26/20  0920   A1C 5.6 5.4 5.9*          Recent Labs   Lab Test 23  0800   WBC 8.1   HGB 14.1   HCT 41.2   MCV 95        Recent Labs   Lab Test 23  0800 22  1237 21  0904   HGB 14.1 13.8 15.0    No results for input(s): \"TROPONINI\" in the last 85293 hours.  No results for input(s): \"BNP\", \"NTBNPI\", \"NTBNP\" in the last 37644 hours.  No results for input(s): \"TSH\" in the last 45326 hours.  No results for input(s): \"INR\" in the last 41134 hours.     Medical History  Surgical History Family History Social History   CAD Past Surgical History:   Procedure Laterality Date    BIOPSY SKIN (LOCATION)      EYE SURGERY       2 of his brothers  from CAD in their early 70s   Social History     Socioeconomic History    Marital status:      Spouse name: Not on file    Number of children: Not on file    Years of education: Not on file    Highest education level: Not on file   Occupational History    Not on file   Tobacco Use    Smoking status: Never    Smokeless tobacco: Never   Substance and Sexual Activity    Alcohol use: Not on file    Drug use: Not on file    Sexual activity: Not on file   Other Topics Concern    Not on file   Social History Narrative    Not on file     Social Determinants of Health "     Financial Resource Strain: Not on file   Food Insecurity: Not on file   Transportation Needs: Not on file   Physical Activity: Not on file   Stress: Not on file   Social Connections: Not on file   Interpersonal Safety: Not on file   Housing Stability: Not on file         Medications  Allergies   Current Outpatient Medications   Medication Sig Dispense Refill    aspirin 81 MG EC tablet [ASPIRIN 81 MG EC TABLET] Take 1 tablet by mouth.      coenzyme Q10 200 mg capsule [COENZYME Q10 200 MG CAPSULE] Take 200 mg by mouth daily. 90 capsule 3    ezetimibe (ZETIA) 10 MG tablet One daily 90 tablet 3    Multiple Vitamin (MULTIVITAMIN ADULT PO) Take 1 tablet by mouth daily      OMEGA-3/DHA/EPA/FISH OIL (FISH OIL-OMEGA-3 FATTY ACIDS) 300-1,000 mg capsule Take 2 g by mouth daily      omeprazole (PRILOSEC) 20 MG DR capsule TAKE ONE CAPSULE BY MOUTH DAILY 90 capsule 3    sildenafil (VIAGRA) 50 MG tablet [SILDENAFIL (VIAGRA) 50 MG TABLET] TAKE 1 TABLET BY MOUTH 1 HOUR BEFORE NEEDED 18 tablet 3    simvastatin (ZOCOR) 80 MG tablet TAKE 1 TABLET BY MOUTH DAILY (Patient taking differently: Take 80 mg by mouth at bedtime TAKE 1 TABLET BY MOUTH DAILY) 90 tablet 2      No Known Allergies

## 2023-11-01 NOTE — PATIENT INSTRUCTIONS
Royce Feliz,    It was a pleasure to see you today at the [facility].     My recommendations after this visit include:    CT coronary angiogram    SHARON Zhou MD, FACC, ANN

## 2023-11-01 NOTE — LETTER
11/1/2023    Luis Alfredo Bolanos MD  [address]  [facility] [address]    RE: Royce Feliz       Dear Colleague,     I had the pleasure of seeing Royce Feliz in the [facility].        Thank you, Dr. Bolanos, for asking [facility] to evaluate Mr. Royce Feliz.      Assessment/Recommendations   Assessment:    Coronary artery disease, multivessel(70 to 99% proximal RCA and 50% left main by CT coronary angiogram 2018), possible anginal equivalents/exertional dyspnea  Hypercholesterolemia, satisfactory control on combination of high-dose simvastatin and Zetia  Chronic low back pain    Plan:  CT coronary angiogram to reassess severity of coronary artery disease.  I am a bit skeptical about doing stress test for the patients with  left main disease.  May consider revascularization if severity of left main disease progressed  I encouraged him to continue with current medications and physical activities    Follow-up based on the results of the CT       History of Present Illness    Mr. Royce Feliz is a 72 year old male who comes in for initial cardiac evaluation.  He has a history of coronary artery disease with known 70 to 90% RCA and 50% left main stenosis by CT coronary angiogram in 2018.  He has follow-up with cardiology at [facility].  The last functional testing was in 2022 at which time he had no evidence of significant ischemia.  He does not have classical angina.  He does complain of shortness of breath with physical activity although he continues to work full-time with some physical involvement.  He works out regularly.  He has not had weight gain, PND, orthopnea.  He denies heart palpitations syncope.  He has been compliant with cholesterol-lowering medications    ECG: Personally reviewed.  8/21/2023 normal sinus rhythm within normal limits.      Cardiopulmonary stress test October 2022 at [facility]    1. Exercise echocardiogram negative for  myocardial ischemia.   2. The patient's exercise capacity was below average, the patient achieved a workload of 8 METS and 89% FAC.   3. Ejection fraction response from 55% at rest to 70% at peak stress, left ventricular end-systolic volume decreased with stress.    consistent with normal left ventricular filling pressure at rest and with exercise.   5. Findings consistent with normal lung aeration at rest and with exercise.   6. The stress ECG was negative for ischemia.   Coronary Angiogram:  June 2018 at [facility]  1. Severe mixed atherosclerotic coronary artery disease, with 50% stenosis of   the distal left main, as well as  discrete 70-99% lesions in the proximal RCA   and mid LAD; CAD-RADS = 4.   2. Normal left ventricular size and function, abnormal septal motion without   other regional wall motion abnormalities.   3. Mild right ventricular enlargement with mild to moderate reduced right   ventricular systolic function   4. Normal caliber thoracic aorta.          Physical Examination Review of Systems   /62 (BP Location: Left arm, Patient Position: Sitting, Cuff Size: Adult Regular)   Pulse 81   Resp 18   Wt 79.8 kg (176 lb)   SpO2 96%   BMI 30.01 kg/m    Body mass index is 30.01 kg/m .  Wt Readings from Last 3 Encounters:   11/01/23 79.8 kg (176 lb)   08/29/23 80.5 kg (177 lb 6.4 oz)   12/06/22 80.8 kg (178 lb 3.2 oz)     General Appearance:   Alert, cooperative, no distress, appears stated age   Head/ENT: Normocephalic, without obvious abnormality. Membranes moist      EYES:  no scleral icterus, normal conjunctivae   Neck: Supple, symmetrical, trachea midline, no adenopathy, thyroid: not enlarged, symmetric, no carotid bruit or JVD   Chest/Lungs:   Lungs are clear to auscultation, respirations unlabored. No tenderness or deformity    Cardiovascular:   Regular rhythm, S1, S2 normal, no murmur, rub or gallop.   Abdomen:  Soft, non-tender, bowel sounds active all four quadrants,  no masses, no  "organomegaly   Extremities: no cyanosis or clubbing. No edema   Skin: Skin color, texture, turgor normal, no rashes or lesions.    Psychiatric: Normal affect, calm   Neurologic: Alert and oriented x 3, moving all four extremities.     Enc Vitals  BP: 116/62  Pulse: 81  Resp: 18  SpO2: 96 %  Weight: 79.8 kg (176 lb) (Shoes on)                                          Lab Results    Chemistry/lipid CBC Cardiac Enzymes/BNP/TSH/INR   Recent Labs   Lab Test 23  08   CHOL 137   HDL 53   LDL 60   TRIG 120     Recent Labs   Lab Test 23  0800 22  1050 21  0904   LDL 60 62 82     Recent Labs   Lab Test 23  08      POTASSIUM 4.8   CHLORIDE 103   CO2 26   *   BUN 23.2*   CR 1.16   GFRESTIMATED 66   GADIEL 9.8     Recent Labs   Lab Test 23  0800 22  1050 21  0904   CR 1.16 1.17 1.21     Recent Labs   Lab Test 23  0800 21  0904 10/26/20  0920   A1C 5.6 5.4 5.9*          Recent Labs   Lab Test 23  08   WBC 8.1   HGB 14.1   HCT 41.2   MCV 95        Recent Labs   Lab Test 23  0800 22  1237 21  0904   HGB 14.1 13.8 15.0    No results for input(s): \"TROPONINI\" in the last 70213 hours.  No results for input(s): \"BNP\", \"NTBNPI\", \"NTBNP\" in the last 42046 hours.  No results for input(s): \"TSH\" in the last 06030 hours.  No results for input(s): \"INR\" in the last 12962 hours.     Medical History  Surgical History Family History Social History   CAD Past Surgical History:   Procedure Laterality Date    BIOPSY SKIN (LOCATION)      EYE SURGERY       2 of his brothers  from CAD in their early 70s   Social History     Socioeconomic History    Marital status:      Spouse name: Not on file    Number of children: Not on file    Years of education: Not on file    Highest education level: Not on file   Occupational History    Not on file   Tobacco Use    Smoking status: Never    Smokeless tobacco: Never   Substance and Sexual " Activity    Alcohol use: Not on file    Drug use: Not on file    Sexual activity: Not on file   Other Topics Concern    Not on file   Social History Narrative    Not on file     Social Determinants of Health     Financial Resource Strain: Not on file   Food Insecurity: Not on file   Transportation Needs: Not on file   Physical Activity: Not on file   Stress: Not on file   Social Connections: Not on file   Interpersonal Safety: Not on file   Housing Stability: Not on file         Medications  Allergies   Current Outpatient Medications   Medication Sig Dispense Refill    aspirin 81 MG EC tablet [ASPIRIN 81 MG EC TABLET] Take 1 tablet by mouth.      coenzyme Q10 200 mg capsule [COENZYME Q10 200 MG CAPSULE] Take 200 mg by mouth daily. 90 capsule 3    ezetimibe (ZETIA) 10 MG tablet One daily 90 tablet 3    Multiple Vitamin (MULTIVITAMIN ADULT PO) Take 1 tablet by mouth daily      OMEGA-3/DHA/EPA/FISH OIL (FISH OIL-OMEGA-3 FATTY ACIDS) 300-1,000 mg capsule Take 2 g by mouth daily      omeprazole (PRILOSEC) 20 MG DR capsule TAKE ONE CAPSULE BY MOUTH DAILY 90 capsule 3    sildenafil (VIAGRA) 50 MG tablet [SILDENAFIL (VIAGRA) 50 MG TABLET] TAKE 1 TABLET BY MOUTH 1 HOUR BEFORE NEEDED 18 tablet 3    simvastatin (ZOCOR) 80 MG tablet TAKE 1 TABLET BY MOUTH DAILY (Patient taking differently: Take 80 mg by mouth at bedtime TAKE 1 TABLET BY MOUTH DAILY) 90 tablet 2      No Known Allergies                     Thank you for allowing me to participate in the care of your patient.      Sincerely,     Iván Zhou MD     [facility]  cc:   Luis Alfredo Bolanos MD  [address] [place] [address]

## 2023-11-01 NOTE — PROGRESS NOTES
Thank you, Dr. Bolanos, for asking [facility] to evaluate Mr. Royce Feliz.      Assessment/Recommendations   Assessment:    Coronary artery disease, multivessel(70 to 99% proximal RCA and 50% left main by CT coronary angiogram 2018), possible anginal equivalents/exertional dyspnea  Hypercholesterolemia, satisfactory control on combination of high-dose simvastatin and Zetia  Chronic low back pain    Plan:  CT coronary angiogram to reassess severity of coronary artery disease.  I am a bit skeptical about doing stress test for the patients with  left main disease.  May consider revascularization if severity of left main disease progressed  I encouraged him to continue with current medications and physical activities    Follow-up based on the results of the CT       History of Present Illness    Mr. Royce Feliz is a 72 year old male who comes in for initial cardiac evaluation.  He has a history of coronary artery disease with known 70 to 90% RCA and 50% left main stenosis by CT coronary angiogram in 2018.  He has follow-up with cardiology at [facility].  The last functional testing was in 2022 at which time he had no evidence of significant ischemia.  He does not have classical angina.  He does complain of shortness of breath with physical activity although he continues to work full-time with some physical involvement.  He works out regularly.  He has not had weight gain, PND, orthopnea.  He denies heart palpitations syncope.  He has been compliant with cholesterol-lowering medications    ECG: Personally reviewed.  8/21/2023 normal sinus rhythm within normal limits.      Cardiopulmonary stress test October 2022 at [facility]    1. Exercise echocardiogram negative for myocardial ischemia.   2. The patient's exercise capacity was below average, the patient achieved a workload of 8 METS and 89% FAC.   3. Ejection fraction response from 55% at rest to 70% at peak stress, left  ventricular end-systolic volume decreased with stress.    consistent with normal left ventricular filling pressure at rest and with exercise.   5. Findings consistent with normal lung aeration at rest and with exercise.   6. The stress ECG was negative for ischemia.   Coronary Angiogram:  June 2018 at [facility]  1. Severe mixed atherosclerotic coronary artery disease, with 50% stenosis of   the distal left main, as well as  discrete 70-99% lesions in the proximal RCA   and mid LAD; CAD-RADS = 4.   2. Normal left ventricular size and function, abnormal septal motion without   other regional wall motion abnormalities.   3. Mild right ventricular enlargement with mild to moderate reduced right   ventricular systolic function   4. Normal caliber thoracic aorta.          Physical Examination Review of Systems   /62 (BP Location: Left arm, Patient Position: Sitting, Cuff Size: Adult Regular)   Pulse 81   Resp 18   Wt 79.8 kg (176 lb)   SpO2 96%   BMI 30.01 kg/m    Body mass index is 30.01 kg/m .  Wt Readings from Last 3 Encounters:   11/01/23 79.8 kg (176 lb)   08/29/23 80.5 kg (177 lb 6.4 oz)   12/06/22 80.8 kg (178 lb 3.2 oz)     General Appearance:   Alert, cooperative, no distress, appears stated age   Head/ENT: Normocephalic, without obvious abnormality. Membranes moist      EYES:  no scleral icterus, normal conjunctivae   Neck: Supple, symmetrical, trachea midline, no adenopathy, thyroid: not enlarged, symmetric, no carotid bruit or JVD   Chest/Lungs:   Lungs are clear to auscultation, respirations unlabored. No tenderness or deformity    Cardiovascular:   Regular rhythm, S1, S2 normal, no murmur, rub or gallop.   Abdomen:  Soft, non-tender, bowel sounds active all four quadrants,  no masses, no organomegaly   Extremities: no cyanosis or clubbing. No edema   Skin: Skin color, texture, turgor normal, no rashes or lesions.    Psychiatric: Normal affect, calm   Neurologic: Alert and oriented x 3, moving  "all four extremities.     Enc Vitals  BP: 116/62  Pulse: 81  Resp: 18  SpO2: 96 %  Weight: 79.8 kg (176 lb) (Shoes on)                                          Lab Results    Chemistry/lipid CBC Cardiac Enzymes/BNP/TSH/INR   Recent Labs   Lab Test 23  0800   CHOL 137   HDL 53   LDL 60   TRIG 120     Recent Labs   Lab Test 23  0800 22  1050 21  0904   LDL 60 62 82     Recent Labs   Lab Test 23  0800      POTASSIUM 4.8   CHLORIDE 103   CO2 26   *   BUN 23.2*   CR 1.16   GFRESTIMATED 66   GADIEL 9.8     Recent Labs   Lab Test 23  0800 22  1050 21  0904   CR 1.16 1.17 1.21     Recent Labs   Lab Test 23  0800 21  0904 10/26/20  0920   A1C 5.6 5.4 5.9*          Recent Labs   Lab Test 23  0800   WBC 8.1   HGB 14.1   HCT 41.2   MCV 95        Recent Labs   Lab Test 23  0800 22  1237 21  0904   HGB 14.1 13.8 15.0    No results for input(s): \"TROPONINI\" in the last 46141 hours.  No results for input(s): \"BNP\", \"NTBNPI\", \"NTBNP\" in the last 75478 hours.  No results for input(s): \"TSH\" in the last 67166 hours.  No results for input(s): \"INR\" in the last 55586 hours.     Medical History  Surgical History Family History Social History   CAD Past Surgical History:   Procedure Laterality Date     BIOPSY SKIN (LOCATION)       EYE SURGERY       2 of his brothers  from CAD in their early 70s   Social History     Socioeconomic History     Marital status:      Spouse name: Not on file     Number of children: Not on file     Years of education: Not on file     Highest education level: Not on file   Occupational History     Not on file   Tobacco Use     Smoking status: Never     Smokeless tobacco: Never   Substance and Sexual Activity     Alcohol use: Not on file     Drug use: Not on file     Sexual activity: Not on file   Other Topics Concern     Not on file   Social History Narrative     Not on file     Social Determinants of " Health     Financial Resource Strain: Not on file   Food Insecurity: Not on file   Transportation Needs: Not on file   Physical Activity: Not on file   Stress: Not on file   Social Connections: Not on file   Interpersonal Safety: Not on file   Housing Stability: Not on file         Medications  Allergies   Current Outpatient Medications   Medication Sig Dispense Refill     aspirin 81 MG EC tablet [ASPIRIN 81 MG EC TABLET] Take 1 tablet by mouth.       coenzyme Q10 200 mg capsule [COENZYME Q10 200 MG CAPSULE] Take 200 mg by mouth daily. 90 capsule 3     ezetimibe (ZETIA) 10 MG tablet One daily 90 tablet 3     Multiple Vitamin (MULTIVITAMIN ADULT PO) Take 1 tablet by mouth daily       OMEGA-3/DHA/EPA/FISH OIL (FISH OIL-OMEGA-3 FATTY ACIDS) 300-1,000 mg capsule Take 2 g by mouth daily       omeprazole (PRILOSEC) 20 MG DR capsule TAKE ONE CAPSULE BY MOUTH DAILY 90 capsule 3     sildenafil (VIAGRA) 50 MG tablet [SILDENAFIL (VIAGRA) 50 MG TABLET] TAKE 1 TABLET BY MOUTH 1 HOUR BEFORE NEEDED 18 tablet 3     simvastatin (ZOCOR) 80 MG tablet TAKE 1 TABLET BY MOUTH DAILY (Patient taking differently: Take 80 mg by mouth at bedtime TAKE 1 TABLET BY MOUTH DAILY) 90 tablet 2      No Known Allergies

## 2023-11-14 ENCOUNTER — HOSPITAL ENCOUNTER (OUTPATIENT)
Dept: CT IMAGING | Facility: CLINIC | Age: 73
Discharge: HOME | End: 2023-11-14
Attending: INTERNAL MEDICINE | Admitting: INTERNAL MEDICINE
Payer: COMMERCIAL

## 2023-11-14 VITALS
DIASTOLIC BLOOD PRESSURE: 53 MMHG | BODY MASS INDEX: 29.37 KG/M2 | SYSTOLIC BLOOD PRESSURE: 104 MMHG | HEIGHT: 64 IN | WEIGHT: 172 LBS

## 2023-11-14 DIAGNOSIS — R07.2 PRECORDIAL PAIN: ICD-10-CM

## 2023-11-14 DIAGNOSIS — I25.119 CORONARY ARTERY DISEASE INVOLVING NATIVE CORONARY ARTERY OF NATIVE HEART WITH ANGINA PECTORIS (H): ICD-10-CM

## 2023-11-14 DIAGNOSIS — R93.1 ABNORMAL FINDINGS ON DIAGNOSTIC IMAGING OF HEART AND CORONARY CIRCULATION: ICD-10-CM

## 2023-11-14 LAB
CREAT BLD-MCNC: 1.2 MG/DL (ref 0.7–1.3)
EGFRCR SERPLBLD CKD-EPI 2021: >60 ML/MIN/1.73M2

## 2023-11-14 PROCEDURE — 0504T CT FFR: CPT | Performed by: INTERNAL MEDICINE

## 2023-11-14 PROCEDURE — 0503T CT FFR: CPT

## 2023-11-14 PROCEDURE — 250N000009 HC RX 250: Performed by: INTERNAL MEDICINE

## 2023-11-14 PROCEDURE — 82565 ASSAY OF CREATININE: CPT

## 2023-11-14 PROCEDURE — 75574 CT ANGIO HRT W/3D IMAGE: CPT

## 2023-11-14 PROCEDURE — 75574 CT ANGIO HRT W/3D IMAGE: CPT | Mod: 26 | Performed by: INTERNAL MEDICINE

## 2023-11-14 PROCEDURE — 250N000011 HC RX IP 250 OP 636: Mod: JZ | Performed by: INTERNAL MEDICINE

## 2023-11-14 PROCEDURE — 250N000013 HC RX MED GY IP 250 OP 250 PS 637: Performed by: INTERNAL MEDICINE

## 2023-11-14 RX ORDER — LIDOCAINE 40 MG/G
CREAM TOPICAL
Status: DISCONTINUED | OUTPATIENT
Start: 2023-11-14 | End: 2023-11-15 | Stop reason: HOSPADM

## 2023-11-14 RX ORDER — DILTIAZEM HYDROCHLORIDE 5 MG/ML
10 INJECTION INTRAVENOUS
Status: DISCONTINUED | OUTPATIENT
Start: 2023-11-14 | End: 2023-11-15 | Stop reason: HOSPADM

## 2023-11-14 RX ORDER — NITROGLYCERIN 0.4 MG/1
0.4 TABLET SUBLINGUAL ONCE
Status: COMPLETED | OUTPATIENT
Start: 2023-11-14 | End: 2023-11-14

## 2023-11-14 RX ORDER — IOPAMIDOL 755 MG/ML
100 INJECTION, SOLUTION INTRAVASCULAR ONCE
Status: COMPLETED | OUTPATIENT
Start: 2023-11-14 | End: 2023-11-14

## 2023-11-14 RX ORDER — METOPROLOL TARTRATE 1 MG/ML
5 INJECTION, SOLUTION INTRAVENOUS
Status: DISCONTINUED | OUTPATIENT
Start: 2023-11-14 | End: 2023-11-15 | Stop reason: HOSPADM

## 2023-11-14 RX ORDER — DILTIAZEM HYDROCHLORIDE 5 MG/ML
5 INJECTION INTRAVENOUS
Status: DISCONTINUED | OUTPATIENT
Start: 2023-11-14 | End: 2023-11-15 | Stop reason: HOSPADM

## 2023-11-14 RX ADMIN — METOPROLOL TARTRATE 5 MG: 1 INJECTION, SOLUTION INTRAVENOUS at 07:08

## 2023-11-14 RX ADMIN — NITROGLYCERIN 0.4 MG: 0.4 TABLET SUBLINGUAL at 07:03

## 2023-11-14 RX ADMIN — IOPAMIDOL 100 ML: 755 INJECTION, SOLUTION INTRAVENOUS at 07:03

## 2023-11-15 LAB
BSA FOR ECHO PROCEDURE: 0 M2
BSA FOR ECHO PROCEDURE: 0 M2

## 2023-11-16 ENCOUNTER — TELEPHONE (OUTPATIENT)
Dept: CARDIOLOGY | Facility: CLINIC | Age: 73
End: 2023-11-16
Payer: COMMERCIAL

## 2023-11-16 DIAGNOSIS — I25.119 CORONARY ARTERY DISEASE INVOLVING NATIVE CORONARY ARTERY OF NATIVE HEART WITH ANGINA PECTORIS (H): Primary | ICD-10-CM

## 2023-11-16 DIAGNOSIS — R93.1 ABNORMAL CARDIAC CT ANGIOGRAPHY: ICD-10-CM

## 2023-11-16 DIAGNOSIS — R93.1 ABNORMAL FINDINGS ON DIAGNOSTIC IMAGING OF HEART AND CORONARY CIRCULATION: ICD-10-CM

## 2023-11-16 NOTE — TELEPHONE ENCOUNTER
----- Message from Iván Zhou MD sent at 11/15/2023  2:38 PM CST -----  Significant coronary artery disease suggested.  Should undergo coronary angiogram and possible intervention

## 2023-11-16 NOTE — TELEPHONE ENCOUNTER
Called Royce and updated on results and recommendations for cors poss pci. Order placed along with labs. He is agreeable. Will get scheduled and then sent education via [employer] and do phone teach. This is his first invasive coronary angiogram. -[facility]          Case Type: coronary angiogram with possible percutaneous cardiac intervention   Ordering Provider: Dr. Zhou   H&P: 11/1  Alerts: none   Additional Info/Urgency: n/a  Orders for Pre-Procedure Labs? ordered

## 2023-11-17 DIAGNOSIS — E78.5 HYPERLIPIDEMIA LDL GOAL <100: ICD-10-CM

## 2023-11-17 RX ORDER — SIMVASTATIN 80 MG
TABLET ORAL
Qty: 90 TABLET | Refills: 2 | Status: SHIPPED | OUTPATIENT
Start: 2023-11-17 | End: 2023-12-20

## 2023-11-17 NOTE — TELEPHONE ENCOUNTER
[facility] Call Center    Phone Message    May a detailed message be left on voicemail: yes     Reason for Call: Other: Royce called back following up from Sommer's call requesting to schedule his angiogram and any other tests Dr. Zhou wanted. Please reach out to Royce to discuss and schedule. Thank you!     Action Taken: Other: Cardiology    Travel Screening: Not Applicable      Thank you!  [facility]

## 2023-11-20 ENCOUNTER — MYC MEDICAL ADVICE (OUTPATIENT)
Dept: CARDIOLOGY | Facility: CLINIC | Age: 73
End: 2023-11-20
Payer: COMMERCIAL

## 2023-11-20 ENCOUNTER — TELEPHONE (OUTPATIENT)
Dept: CARDIOLOGY | Facility: CLINIC | Age: 73
End: 2023-11-20
Payer: COMMERCIAL

## 2023-11-20 ENCOUNTER — PREP FOR PROCEDURE (OUTPATIENT)
Dept: CARDIOLOGY | Facility: CLINIC | Age: 73
End: 2023-11-20
Payer: COMMERCIAL

## 2023-11-20 DIAGNOSIS — R93.1 ABNORMAL CARDIAC CT ANGIOGRAPHY: Primary | ICD-10-CM

## 2023-11-20 RX ORDER — FENTANYL CITRATE 50 UG/ML
25 INJECTION, SOLUTION INTRAMUSCULAR; INTRAVENOUS
Status: CANCELLED | OUTPATIENT
Start: 2023-11-22

## 2023-11-20 RX ORDER — LIDOCAINE 40 MG/G
CREAM TOPICAL
Status: CANCELLED | OUTPATIENT
Start: 2023-11-20

## 2023-11-20 RX ORDER — ASPIRIN 81 MG/1
243 TABLET, CHEWABLE ORAL ONCE
Status: CANCELLED | OUTPATIENT
Start: 2023-11-22

## 2023-11-20 RX ORDER — SODIUM CHLORIDE 9 MG/ML
INJECTION, SOLUTION INTRAVENOUS CONTINUOUS
Status: CANCELLED | OUTPATIENT
Start: 2023-11-22

## 2023-11-20 RX ORDER — ASPIRIN 325 MG
325 TABLET ORAL ONCE
Status: CANCELLED | OUTPATIENT
Start: 2023-11-22 | End: 2023-11-20

## 2023-11-20 NOTE — TELEPHONE ENCOUNTER
[facility] Center    Phone Message    May a detailed message be left on voicemail: yes     Reason for Call: Other: Pt would like a call back to discuss scheduling an angiogram     Action Taken: Other: Cardio    Travel Screening: Not Applicable

## 2023-11-20 NOTE — TELEPHONE ENCOUNTER
Pau Padgett Mallory J, RN  Caller: Unspecified (4 days ago,  4:25 PM)  Case type: CORS POSS PCI    Procedure Physician(s): KB/MARCH    Procedure Date and Patient Arrival Time: Wednesday 11/22, with arrival time of 630 AM    H&P: Completed on 11/1 WTZ    Pre-Procedure Lab Appt: ON ADMSSION    Alerts/Important Info: None    Thanks,  Nina        ==will send education over [facility]

## 2023-11-20 NOTE — TELEPHONE ENCOUNTER
[facility] Call Center    Phone Message    May a detailed message be left on voicemail: yes     Reason for Call: Other: Pt requesting a call back to assist in scheduling angiogram.      Action Taken: Other: Cardiology    Travel Screening: Not Applicable    Thank you!  [facility]

## 2023-11-20 NOTE — TELEPHONE ENCOUNTER
Called patient to address his questions and concerns. He actually just wanted to schedule his procedure now. He had questions that he presented to his PMD and he is now willing to proceed. Msg to schedulers in other encounter-[facility]

## 2023-11-21 ENCOUNTER — TELEPHONE (OUTPATIENT)
Dept: CARDIOLOGY | Facility: CLINIC | Age: 73
End: 2023-11-21
Payer: COMMERCIAL

## 2023-11-21 NOTE — TELEPHONE ENCOUNTER
Royce Lazarotavares  [address] [place] [address]  [phone] (home)     PCP:  Luis Alfredo Bolanos  H&P completed by:  11/1  Admit date 11/22 Arrival time:  0630  Anticoagulation:  NA  Previous PCI: No  Bypass Grafts: No  Renal Issues: No  Diabetic?: No  Device?: No    Ambulation status: ambulatory     Reason for Visit:  Telephone call to discuss pre-procedure education in preparation for: Coronary Angiogram with Possible PCI    Procedure Prep:  EKG results obtained, dated: To be completed on day of cath lab procedure  Hemogram results obtained: To be completed on day of cath lab procedure  Basic Metabolic Panel results obtained: To be completed on day of cath lab procedure  Pertinent cardiac test results: in epic- abnl ccta      Patient Education    Your arrival time is 0630.  [place] [address] - Main Entrance of the Hospital  Please plan on being at the hospital all day.  At any time, emergencies and/or urgent cases may come up which could delay the start of your procedure.    COVID Testing Instructions  *Mandatory COVID Testing: ALL Patients will need to complete a COVID test no sooner than 4 days prior to their procedure (regardless of vaccination status).    To schedule COVID testing Please call [phone]  If you want to complete this at an outside facility please call them to find out if they will have the results within the appropriate time frame and their fax number.  You will need to provide us with that information so we can send the order.  The facility completing the test will need to fax the results to [phone]  If you are running into and issues please call us.     Pre-procedure instructions  Take your temperature in the morning prior to coming in.  If your temperature is 100 F please call [facility] [phone] and notify them.  If you do not have access to a thermometer at home, please come in for testing.  If you  are running a temperature your procedure may be rescheduled.  Patient instructed to not Eat or Drink after midnight.  Patient instructed to shower the evening before or the morning of the procedure.  Patient instructed to arrange for transportation home following procedure from a responsible family member or friend. No driving for at least 24 hours.  Patient instructed to have a responsible adult with them for 24 hours post-procedure.  Post-procedure follow up process.  Conscious sedation discussed.      Pre-procedure medication instructions.  Continue medications as scheduled, with a small amount of water on the day of the procedure unless indicated. (NO Diabetic Medications or Blood Thinners)  Pt instructed not to consume Alcohol, Tobacco, Caffeine, or Carbonated beverages 12 hours prior to procedure.  Patient instructed to take 324 mg of Aspirin the night before and morning of procedure: Yes  Other medication: instructed to only take aspirin the a.m. of the procedure.      Patient states understanding of procedure and agrees to proceed.    *PATIENTS RECORDS AVAILABLE IN [facility] UNLESS OTHERWISE INDICATED*      Patient Active Problem List   Diagnosis    Hypercholesteremia    Prostatism    High cholesterol    Malignant melanoma of torso excluding breast (H)    Chronic kidney disease, stage 3a (H)    Coronary artery disease involving native coronary artery of native heart with angina pectoris (H24)    Precordial pain       Current Outpatient Medications   Medication Sig Dispense Refill    aspirin 81 MG EC tablet [ASPIRIN 81 MG EC TABLET] Take 1 tablet by mouth.      coenzyme Q10 200 mg capsule [COENZYME Q10 200 MG CAPSULE] Take 200 mg by mouth daily. 90 capsule 3    ezetimibe (ZETIA) 10 MG tablet One daily 90 tablet 3    Multiple Vitamin (MULTIVITAMIN ADULT PO) Take 1 tablet by mouth daily      OMEGA-3/DHA/EPA/FISH OIL (FISH OIL-OMEGA-3 FATTY ACIDS) 300-1,000 mg capsule Take 2 g by mouth daily      omeprazole  (PRILOSEC) 20 MG DR capsule TAKE ONE CAPSULE BY MOUTH DAILY 90 capsule 3    sildenafil (VIAGRA) 50 MG tablet [SILDENAFIL (VIAGRA) 50 MG TABLET] TAKE 1 TABLET BY MOUTH 1 HOUR BEFORE NEEDED 18 tablet 3    simvastatin (ZOCOR) 80 MG tablet TAKE 1 TABLET BY MOUTH DAILY 90 tablet 2       No Known Allergies    Sommer Malloy RN on 11/21/2023 at 3:46 PM        Pt's family will be his .

## 2023-11-21 NOTE — TELEPHONE ENCOUNTER
Called patient and LVM to answer any pre-procedure questions. Direct line provided for call back. -[facility]          11/22  Received: Yesterday  Sommer Malloy, Sommer Shelton, Pau Siddiqui Mallory J, RN  Caller: Unspecified (4 days ago,  4:25 PM)  Case type: CORS POSS PCI    Procedure Physician(s): KB/MARCH    Procedure Date and Patient Arrival Time: Wednesday 11/22, with arrival time of 630 AM    H&P: Completed on 11/1 WTZ    Pre-Procedure Lab Appt: ON ADMSSION    Alerts/Important Info: None    Nina Lewis

## 2023-11-22 ENCOUNTER — APPOINTMENT (OUTPATIENT)
Dept: CARDIOLOGY | Facility: HOSPITAL | Age: 73
End: 2023-11-22
Attending: SURGERY
Payer: COMMERCIAL

## 2023-11-22 ENCOUNTER — APPOINTMENT (OUTPATIENT)
Dept: ULTRASOUND IMAGING | Facility: HOSPITAL | Age: 73
End: 2023-11-22
Attending: SURGERY
Payer: COMMERCIAL

## 2023-11-22 ENCOUNTER — TELEPHONE (OUTPATIENT)
Dept: CARDIOLOGY | Facility: CLINIC | Age: 73
End: 2023-11-22

## 2023-11-22 ENCOUNTER — HOSPITAL ENCOUNTER (OUTPATIENT)
Facility: HOSPITAL | Age: 73
Discharge: HOME | End: 2023-11-22
Attending: INTERNAL MEDICINE | Admitting: INTERNAL MEDICINE
Payer: COMMERCIAL

## 2023-11-22 VITALS
BODY MASS INDEX: 28.32 KG/M2 | SYSTOLIC BLOOD PRESSURE: 135 MMHG | HEART RATE: 75 BPM | WEIGHT: 170 LBS | HEIGHT: 65 IN | TEMPERATURE: 97.1 F | DIASTOLIC BLOOD PRESSURE: 64 MMHG | RESPIRATION RATE: 20 BRPM | OXYGEN SATURATION: 94 %

## 2023-11-22 DIAGNOSIS — I25.84 CORONARY ARTERY DISEASE DUE TO CALCIFIED CORONARY LESION: Primary | ICD-10-CM

## 2023-11-22 DIAGNOSIS — R93.1 ABNORMAL CARDIAC CT ANGIOGRAPHY: ICD-10-CM

## 2023-11-22 DIAGNOSIS — I25.119 CORONARY ARTERY DISEASE INVOLVING NATIVE CORONARY ARTERY OF NATIVE HEART WITH ANGINA PECTORIS (H): ICD-10-CM

## 2023-11-22 DIAGNOSIS — R93.1 ABNORMAL FINDINGS ON DIAGNOSTIC IMAGING OF HEART AND CORONARY CIRCULATION: ICD-10-CM

## 2023-11-22 DIAGNOSIS — I25.10 CORONARY ARTERY DISEASE DUE TO CALCIFIED CORONARY LESION: Primary | ICD-10-CM

## 2023-11-22 PROBLEM — Z98.890 STATUS POST CORONARY ANGIOGRAM: Status: ACTIVE | Noted: 2023-11-22

## 2023-11-22 LAB
ABO/RH(D): NORMAL
ANION GAP SERPL CALCULATED.3IONS-SCNC: 11 MMOL/L (ref 7–15)
ANTIBODY SCREEN: NEGATIVE
ATRIAL RATE - MUSE: 73 BPM
BUN SERPL-MCNC: 22.2 MG/DL (ref 8–23)
CALCIUM SERPL-MCNC: 9.4 MG/DL (ref 8.8–10.2)
CHLORIDE SERPL-SCNC: 103 MMOL/L (ref 98–107)
CHOLEST SERPL-MCNC: 149 MG/DL
CREAT SERPL-MCNC: 1.12 MG/DL (ref 0.67–1.17)
DEPRECATED HCO3 PLAS-SCNC: 26 MMOL/L (ref 22–29)
DIASTOLIC BLOOD PRESSURE - MUSE: NORMAL MMHG
EGFRCR SERPLBLD CKD-EPI 2021: 69 ML/MIN/1.73M2
ERYTHROCYTE [DISTWIDTH] IN BLOOD BY AUTOMATED COUNT: 13.7 % (ref 10–15)
GLUCOSE SERPL-MCNC: 118 MG/DL (ref 70–99)
HCT VFR BLD AUTO: 43 % (ref 40–53)
HDLC SERPL-MCNC: 56 MG/DL
HGB BLD-MCNC: 14.3 G/DL (ref 13.3–17.7)
INTERPRETATION ECG - MUSE: NORMAL
LDLC SERPL CALC-MCNC: 64 MG/DL
LVEF ECHO: NORMAL
MCH RBC QN AUTO: 31.4 PG (ref 26.5–33)
MCHC RBC AUTO-ENTMCNC: 33.3 G/DL (ref 31.5–36.5)
MCV RBC AUTO: 95 FL (ref 78–100)
NONHDLC SERPL-MCNC: 93 MG/DL
P AXIS - MUSE: 19 DEGREES
PLATELET # BLD AUTO: 188 10E3/UL (ref 150–450)
POTASSIUM SERPL-SCNC: 4.2 MMOL/L (ref 3.4–5.3)
PR INTERVAL - MUSE: 164 MS
QRS DURATION - MUSE: 86 MS
QT - MUSE: 384 MS
QTC - MUSE: 423 MS
R AXIS - MUSE: 26 DEGREES
RBC # BLD AUTO: 4.55 10E6/UL (ref 4.4–5.9)
SODIUM SERPL-SCNC: 140 MMOL/L (ref 135–145)
SPECIMEN EXPIRATION DATE: NORMAL
SYSTOLIC BLOOD PRESSURE - MUSE: NORMAL MMHG
T AXIS - MUSE: 7 DEGREES
TRIGL SERPL-MCNC: 145 MG/DL
VENTRICULAR RATE- MUSE: 73 BPM
WBC # BLD AUTO: 7.4 10E3/UL (ref 4–11)

## 2023-11-22 PROCEDURE — 272N000001 HC OR GENERAL SUPPLY STERILE: Performed by: INTERNAL MEDICINE

## 2023-11-22 PROCEDURE — 99152 MOD SED SAME PHYS/QHP 5/>YRS: CPT | Performed by: INTERNAL MEDICINE

## 2023-11-22 PROCEDURE — 999N000054 HC STATISTIC EKG NON-CHARGEABLE

## 2023-11-22 PROCEDURE — 255N000002 HC RX 255 OP 636: Performed by: INTERNAL MEDICINE

## 2023-11-22 PROCEDURE — 93458 L HRT ARTERY/VENTRICLE ANGIO: CPT | Mod: 26 | Performed by: INTERNAL MEDICINE

## 2023-11-22 PROCEDURE — 85014 HEMATOCRIT: CPT | Performed by: INTERNAL MEDICINE

## 2023-11-22 PROCEDURE — 93460 R&L HRT ART/VENTRICLE ANGIO: CPT | Performed by: INTERNAL MEDICINE

## 2023-11-22 PROCEDURE — 80061 LIPID PANEL: CPT | Performed by: INTERNAL MEDICINE

## 2023-11-22 PROCEDURE — 93880 EXTRACRANIAL BILAT STUDY: CPT

## 2023-11-22 PROCEDURE — 80048 BASIC METABOLIC PNL TOTAL CA: CPT | Performed by: INTERNAL MEDICINE

## 2023-11-22 PROCEDURE — 999N000208 ECHOCARDIOGRAM COMPLETE

## 2023-11-22 PROCEDURE — 250N000011 HC RX IP 250 OP 636: Performed by: INTERNAL MEDICINE

## 2023-11-22 PROCEDURE — 93458 L HRT ARTERY/VENTRICLE ANGIO: CPT | Performed by: INTERNAL MEDICINE

## 2023-11-22 PROCEDURE — C1894 INTRO/SHEATH, NON-LASER: HCPCS | Performed by: INTERNAL MEDICINE

## 2023-11-22 PROCEDURE — 258N000003 HC RX IP 258 OP 636: Performed by: INTERNAL MEDICINE

## 2023-11-22 PROCEDURE — 93306 TTE W/DOPPLER COMPLETE: CPT | Mod: 26 | Performed by: INTERNAL MEDICINE

## 2023-11-22 PROCEDURE — C1887 CATHETER, GUIDING: HCPCS | Performed by: INTERNAL MEDICINE

## 2023-11-22 PROCEDURE — 36415 COLL VENOUS BLD VENIPUNCTURE: CPT | Performed by: INTERNAL MEDICINE

## 2023-11-22 PROCEDURE — 86901 BLOOD TYPING SEROLOGIC RH(D): CPT | Performed by: INTERNAL MEDICINE

## 2023-11-22 PROCEDURE — 93005 ELECTROCARDIOGRAM TRACING: CPT

## 2023-11-22 PROCEDURE — 93010 ELECTROCARDIOGRAM REPORT: CPT | Performed by: INTERNAL MEDICINE

## 2023-11-22 RX ORDER — FLUMAZENIL 0.1 MG/ML
0.2 INJECTION, SOLUTION INTRAVENOUS
Status: DISCONTINUED | OUTPATIENT
Start: 2023-11-22 | End: 2023-11-22 | Stop reason: HOSPADM

## 2023-11-22 RX ORDER — LIDOCAINE 40 MG/G
CREAM TOPICAL
Status: DISCONTINUED | OUTPATIENT
Start: 2023-11-22 | End: 2023-11-22 | Stop reason: HOSPADM

## 2023-11-22 RX ORDER — NALOXONE HYDROCHLORIDE 0.4 MG/ML
0.4 INJECTION, SOLUTION INTRAMUSCULAR; INTRAVENOUS; SUBCUTANEOUS
Status: DISCONTINUED | OUTPATIENT
Start: 2023-11-22 | End: 2023-11-22 | Stop reason: HOSPADM

## 2023-11-22 RX ORDER — NALOXONE HYDROCHLORIDE 0.4 MG/ML
0.2 INJECTION, SOLUTION INTRAMUSCULAR; INTRAVENOUS; SUBCUTANEOUS
Status: DISCONTINUED | OUTPATIENT
Start: 2023-11-22 | End: 2023-11-22 | Stop reason: HOSPADM

## 2023-11-22 RX ORDER — FENTANYL CITRATE 50 UG/ML
25 INJECTION, SOLUTION INTRAMUSCULAR; INTRAVENOUS
Status: DISCONTINUED | OUTPATIENT
Start: 2023-11-22 | End: 2023-11-22 | Stop reason: HOSPADM

## 2023-11-22 RX ORDER — ACETAMINOPHEN 325 MG/1
650 TABLET ORAL EVERY 4 HOURS PRN
Status: DISCONTINUED | OUTPATIENT
Start: 2023-11-22 | End: 2023-11-22 | Stop reason: HOSPADM

## 2023-11-22 RX ORDER — OXYCODONE HYDROCHLORIDE 5 MG/1
5 TABLET ORAL EVERY 4 HOURS PRN
Status: DISCONTINUED | OUTPATIENT
Start: 2023-11-22 | End: 2023-11-22 | Stop reason: HOSPADM

## 2023-11-22 RX ORDER — SODIUM CHLORIDE 9 MG/ML
INJECTION, SOLUTION INTRAVENOUS CONTINUOUS
Status: DISCONTINUED | OUTPATIENT
Start: 2023-11-22 | End: 2023-11-22 | Stop reason: HOSPADM

## 2023-11-22 RX ORDER — OXYCODONE HYDROCHLORIDE 5 MG/1
10 TABLET ORAL EVERY 4 HOURS PRN
Status: DISCONTINUED | OUTPATIENT
Start: 2023-11-22 | End: 2023-11-22 | Stop reason: HOSPADM

## 2023-11-22 RX ORDER — ASPIRIN 325 MG
325 TABLET ORAL ONCE
Status: COMPLETED | OUTPATIENT
Start: 2023-11-22 | End: 2023-11-22

## 2023-11-22 RX ORDER — ATROPINE SULFATE 0.1 MG/ML
0.5 INJECTION INTRAVENOUS
Status: DISCONTINUED | OUTPATIENT
Start: 2023-11-22 | End: 2023-11-22 | Stop reason: HOSPADM

## 2023-11-22 RX ORDER — IODIXANOL 320 MG/ML
INJECTION, SOLUTION INTRAVASCULAR
Status: DISCONTINUED | OUTPATIENT
Start: 2023-11-22 | End: 2023-11-22 | Stop reason: HOSPADM

## 2023-11-22 RX ORDER — ASPIRIN 81 MG/1
243 TABLET, CHEWABLE ORAL ONCE
Status: COMPLETED | OUTPATIENT
Start: 2023-11-22 | End: 2023-11-22

## 2023-11-22 RX ORDER — FENTANYL CITRATE 50 UG/ML
INJECTION, SOLUTION INTRAMUSCULAR; INTRAVENOUS
Status: DISCONTINUED | OUTPATIENT
Start: 2023-11-22 | End: 2023-11-22 | Stop reason: HOSPADM

## 2023-11-22 RX ADMIN — SODIUM CHLORIDE: 9 INJECTION, SOLUTION INTRAVENOUS at 07:49

## 2023-11-22 RX ADMIN — PERFLUTREN 2 ML: 6.52 INJECTION, SUSPENSION INTRAVENOUS at 13:37

## 2023-11-22 ASSESSMENT — ACTIVITIES OF DAILY LIVING (ADL)
ADLS_ACUITY_SCORE: 35

## 2023-11-22 ASSESSMENT — EJECTION FRACTION: EF_VALUE: .24

## 2023-11-22 NOTE — PRE-PROCEDURE
GENERAL PRE-PROCEDURE:   Procedure:  Coronary angiogram with possible PCI  Date/Time:  11/22/2023 7:13 AM    Written consent obtained?: Yes    Risks and benefits: Risks, benefits and alternatives were discussed    Consent given by:  Patient  Patient states understanding of procedure being performed: Yes    Patient's understanding of procedure matches consent: Yes    Procedure consent matches procedure scheduled: Yes    Expected level of sedation:  Moderate  Appropriately NPO:  Yes  ASA Class:  3 (CAD by virtue of CT scan, exertional chest pain, hypercholesterolemia, chronic low back pain)  Mallampati  :  Grade 2- soft palate, base of uvula, tonsillar pillars, and portion of posterior pharyngeal wall visible  Lungs:  Lungs clear with good breath sounds bilaterally  Heart:  Normal heart sounds and rate  History & Physical reviewed:  History and physical reviewed and updates made (see comment)  H&P Comments:  Clinically Significant Risk Factors Present on Admission    Cardiovascular : CAD by virtue of CT scan, exertional chest pain, hypercholesterolemia    Fluid & Electrolyte Disorders : Not present on admission    Gastroenterology : Not present on admission    Hematology/Oncology : Not present on admission    Nephrology : Not present on admission    Neurology : Not present on admission    Pulmonology : Not present on admission    Systemic : Chronic low back pain    Statement of review:  I have reviewed the lab findings, diagnostic data, medications, and the plan for sedation

## 2023-11-22 NOTE — Clinical Note
Prepped: groin and right radial. Prepped with: ChloraPrep. The patient was draped. .Pre-procedure site marking:Insertion site not predetermined SINAN

## 2023-11-22 NOTE — INTERVAL H&P NOTE
"I have reviewed the surgical (or preoperative) H&P that is linked to this encounter, and examined the patient. There are no significant changes    Clinical Conditions Present on Arrival:  Clinically Significant Risk Factors Present on Admission                 # Drug Induced Platelet Defect: home medication list includes an antiplatelet medication  # Overweight: Estimated body mass index is 28.29 kg/m  as calculated from the following:    Height as of this encounter: 1.651 m (5' 5\").    Weight as of this encounter: 77.1 kg (170 lb).       "

## 2023-11-22 NOTE — DISCHARGE INSTRUCTIONS

## 2023-11-23 NOTE — PROGRESS NOTES
PT was sent home with a single dose of plavix 75mg oral to cover tomorrow until RX can be picked up Friday.

## 2023-11-28 ENCOUNTER — PREP FOR PROCEDURE (OUTPATIENT)
Dept: ADMINISTRATIVE | Facility: CLINIC | Age: 73
End: 2023-11-28

## 2023-11-28 ENCOUNTER — OFFICE VISIT (OUTPATIENT)
Dept: CARDIOLOGY | Facility: CLINIC | Age: 73
End: 2023-11-28
Attending: NURSE PRACTITIONER
Payer: COMMERCIAL

## 2023-11-28 VITALS
BODY MASS INDEX: 29.62 KG/M2 | HEART RATE: 72 BPM | SYSTOLIC BLOOD PRESSURE: 122 MMHG | WEIGHT: 178 LBS | RESPIRATION RATE: 16 BRPM | DIASTOLIC BLOOD PRESSURE: 74 MMHG

## 2023-11-28 DIAGNOSIS — I25.10 CAD (CORONARY ARTERY DISEASE): Primary | ICD-10-CM

## 2023-11-28 DIAGNOSIS — I25.84 CORONARY ARTERY DISEASE DUE TO CALCIFIED CORONARY LESION: ICD-10-CM

## 2023-11-28 DIAGNOSIS — I25.84 CORONARY ARTERY DISEASE DUE TO CALCIFIED CORONARY LESION: Primary | ICD-10-CM

## 2023-11-28 DIAGNOSIS — I25.10 CORONARY ARTERY DISEASE DUE TO CALCIFIED CORONARY LESION: Primary | ICD-10-CM

## 2023-11-28 DIAGNOSIS — I25.10 CORONARY ARTERY DISEASE DUE TO CALCIFIED CORONARY LESION: ICD-10-CM

## 2023-11-28 PROCEDURE — 99205 OFFICE O/P NEW HI 60 MIN: CPT | Performed by: SURGERY

## 2023-11-28 RX ORDER — ASPIRIN 81 MG/1
81 TABLET, CHEWABLE ORAL
Status: CANCELLED | OUTPATIENT
Start: 2023-12-06

## 2023-11-28 RX ORDER — LIDOCAINE 40 MG/G
CREAM TOPICAL
Status: CANCELLED | OUTPATIENT
Start: 2023-11-28

## 2023-11-28 RX ORDER — ASPIRIN 81 MG/1
162 TABLET, CHEWABLE ORAL
Status: CANCELLED | OUTPATIENT
Start: 2023-12-06

## 2023-11-28 RX ORDER — CEFAZOLIN SODIUM/WATER 2 G/20 ML
2 SYRINGE (ML) INTRAVENOUS
Status: CANCELLED | OUTPATIENT
Start: 2023-12-06

## 2023-11-28 RX ORDER — CHLORHEXIDINE GLUCONATE ORAL RINSE 1.2 MG/ML
10 SOLUTION DENTAL ONCE
Status: CANCELLED | OUTPATIENT
Start: 2023-12-06 | End: 2023-11-28

## 2023-11-28 RX ORDER — SODIUM CHLORIDE, SODIUM GLUCONATE, SODIUM ACETATE, POTASSIUM CHLORIDE AND MAGNESIUM CHLORIDE 526; 502; 368; 37; 30 MG/100ML; MG/100ML; MG/100ML; MG/100ML; MG/100ML
1000 INJECTION, SOLUTION INTRAVENOUS
Status: CANCELLED | OUTPATIENT
Start: 2023-12-06 | End: 2023-12-06

## 2023-11-28 RX ORDER — PHENYLEPHRINE HCL IN 0.9% NACL 50MG/250ML
.1-6 PLASTIC BAG, INJECTION (ML) INTRAVENOUS CONTINUOUS
Status: CANCELLED | OUTPATIENT
Start: 2023-12-06

## 2023-11-28 RX ORDER — CEFAZOLIN SODIUM/WATER 2 G/20 ML
2 SYRINGE (ML) INTRAVENOUS SEE ADMIN INSTRUCTIONS
Status: CANCELLED | OUTPATIENT
Start: 2023-12-06

## 2023-11-28 RX ORDER — DEXMEDETOMIDINE HYDROCHLORIDE 4 UG/ML
.2-1.2 INJECTION, SOLUTION INTRAVENOUS CONTINUOUS
Status: CANCELLED | OUTPATIENT
Start: 2023-12-06

## 2023-11-28 NOTE — PROGRESS NOTES
Cardiac Surgery Consultation      Royce Feliz MRN# [id number]   YOB: 1950 Age: 72 year old        Reason for consult: I was asked by Dr. Zhou to evaluate this patient for severe multivessel coronary artery disease.           Assessment and Plan:   Mr. Feliz is a 72-year-old man with severe multivessel coronary artery disease. I recommend coronary artery bypass grafting. I described the technical details, as well as the expected postoperative course and recovery to the patient. I also discussed the risks and benefits of the procedure. The risks include but are not limited to bleeding, infection, stroke, heart or graft failure with myocardial infarction, dysrhythmia, respiratory failure, kidney or liver injury, bowel or limb ischemia, and death. The calculated STS risk:    Procedure Type: Isolated CABG  Perioperative Outcome Estimate %  Operative Mortality 0.79%  Morbidity & Mortality 4.4%  Stroke 0.734%  Renal Failure 0.677%  Reoperation 1.6%  Prolonged Ventilation 2.16%  Deep Sternal Wound Infection 0.128%  Long Hospital Stay (>14 days) 2.24%  Short Hospital Stay (<6 days) 57.7%    The patient understands these risks and wishes to undergo the operation.       Surgery will be scheduled in the coming weeks.    The preoperative evaluation is complete.    History is obtained from the patient.         History of Present Illness:   This patient is a 72 year old male who presents to discuss the option of coronary artery bypass grafting for treatment of his severe multivessel coronary artery disease. He has a strong family history of premature coronary artery disease, and sadly his brother passed away from a massive MI. The patient has coronary artery disease risk factors including hypertension, dyslipidemia, and type 2 diabetes mellitus. He underwent a coronary CTA with FFR that demonstrates severe multivessel coronary artery disease with flow limiting stenoses. He has no chest pain or  pressure. He denies palpitations. He is quite active and otherwise healthy. He denies heart failure symptoms.                Past Medical History:   Lower extremity fracture treated with closed reduction and fixation  Eye surgery to remove foreign body  Hypertension  Dyslipidemia  Type 2 diabetes mellitus          Past Surgical History:     Past Surgical History:   Procedure Laterality Date    BIOPSY SKIN (LOCATION)      CV CORONARY ANGIOGRAM N/A 11/22/2023    Procedure: Coronary Angiogram;  Surgeon: Sanjay Arredondo MD;  Location: [facility] CV    CV PCI N/A 11/22/2023    Procedure: Percutaneous Coronary Intervention;  Surgeon: Sanjay Arredondo MD;  Location: [facility] CV    EYE SURGERY                 Social History:     Social History     Tobacco Use    Smoking status: Never    Smokeless tobacco: Never   Substance Use Topics    Alcohol use: Not on file             Family History:     Family History   Problem Relation Age of Onset    Cancer Mother     Diabetes Father     Cancer Sister     Varicose Veins Sister     Diabetes Brother              Immunizations:     Immunization History   Administered Date(s) Administered    COVID-19 Monovalent Booster 18+ (Moderna) 11/17/2021    COVID-19 Vaccine (Love) 03/15/2021    HepA, Unspecified 06/28/2007    HepB, Unspecified 06/28/2007    Hepatitis A (ADULT 19+) 06/28/2007    Hepatitis B, Adult 06/28/2007    Influenza (High Dose) 3 valent vaccine 11/25/2015, 11/16/2017    Pneumo Conj 13-V (2010&after) 11/25/2015    Pneumococcal 23 valent 11/16/2017    TDAP (Adacel,Boostrix) 09/25/2019    TDAP Vaccine (Boostrix) 09/25/2019    Typhoid IM 07/11/2007    Typhoid, Unspecified Formulation 07/11/2007    Zoster recombinant adjuvanted (SHINGRIX) 08/01/2018, 10/03/2019             Allergies:   No Known Allergies          Medications:     Current Outpatient Medications Ordered in Epic   Medication    aspirin 81 MG EC tablet    coenzyme Q10 200 mg capsule    ezetimibe  (ZETIA) 10 MG tablet    Multiple Vitamin (MULTIVITAMIN ADULT PO)    OMEGA-3/DHA/EPA/FISH OIL (FISH OIL-OMEGA-3 FATTY ACIDS) 300-1,000 mg capsule    omeprazole (PRILOSEC) 20 MG DR capsule    sildenafil (VIAGRA) 50 MG tablet    simvastatin (ZOCOR) 80 MG tablet     No current Epic-ordered facility-administered medications on file.             Review of Systems:     The 10 point Review of Systems is negative other than noted in the HPI            Physical Exam:   Vitals were reviewed      BP: 122/74 Pulse: 72   Resp: 16        Constitutional:   awake, alert, cooperative, no apparent distress, and appears stated age     Eyes:   Lids and lashes normal, pupils equal, round and reactive to light, extra ocular muscles intact, sclera clear, conjunctiva normal     ENT:   normocepalic, without obvious abnormality     Lungs:   no increased work of breathing, good air exchange, and no retractions     Cardiovascular:   regular rate and rhythm     Abdomen:   non-distended     Musculoskeletal:   no lower extremity pitting edema present  there is no redness, warmth, or swelling of the joints  full range of motion noted  motor strength is 5 out of 5 all extremities bilaterally     Neurologic:   Mental Status Exam:  Level of Alertness:   awake  Orientation:   person, place, time  Cranial Nerves:  cranial nerves II-XII are grossly intact  Motor Exam:  moves all extremities well and symmetrically     Neuropsychiatric:   General: normal, calm, and normal eye contact  Level of consciousness: alert / normal  Affect: normal     Skin:   no bruising or bleeding, normal skin color, texture, turgor, and no redness, warmth, or swelling          Data:   All laboratory data reviewed  All cardiac studies reviewed by me.  All imaging studies reviewed by me.    CARDIAC CATHETERIZATION:    Mid LM to Dist LM lesion is 40% stenosed.    Prox LAD lesion is 70% stenosed.    Prox LAD to Mid LAD lesion is 60% stenosed.    Mid LAD lesion is 75% stenosed.     1st Diag lesion is 70% stenosed.    Prox RCA lesion is 40% stenosed.    Dist RCA lesion is 90% stenosed.    RPDA lesion is 80% stenosed.    Mid RCA lesion is 30% stenosed.     1.  Severe calcific multivessel coronary disease  2.  Sequential calcified stenoses proximal to mid LAD, involving takeoff of large first diagonal branch.  3.  Diffuse severe right coronary calcification with moderate stenosis proximal third and severe stenosis distally proximal to the crux.  Moderately severe stenosis mid PDA, with a large posterolateral branch that appears widely patent.  4.  Left circumflex is a very small system.  5.  Normal LVEDP    TRANSTHORACIC ECHOCARDIOGRAPHY:  1. The left ventricle is normal in size. Left ventricular function is  normal.The ejection fraction is 60-65%. There is normal left ventricular wall  thickness. Left ventricular diastolic function is normal. No regional wall  motion abnormalities noted.  2. Normal right ventricle size and systolic function.  3. No hemodynamically significant valvular abnormalities on 2D or color flow  imaging.    CT FFR:  1. The 70-99 % stenosis in the left anterior descending coronary artery proximal to mid segment has a high likelihood of lesion specific ischemia with an FFRct value of 0.67.  2. The 70-99 % stenosis in the right coronary artery mid to distal segment has a high likelihood of lesion specific ischemia with an FFRct value of 0.71.    FFRct Values Summary:  >0.80 (distal to the stenosis): Low likelihood of lesion-specific ischemia  <= 0.80 (distal to the stenosis): High likelihood of lesion-specific ischemia  Assessment of lesion-specific ischemia by FFRct should rely on FFRct values measured approximately 10-15 mm distal to the lesion of interest, rather than from the terminal vessel. FFRct values <0.80 measured from the terminal vessel do not necessarily indicate lesion-specific ischemia, particularly if there is only mild disease in the more proximal segments  of the vessel.     EKG:  Sinus rhythm   Normal ECG   When compared with ECG of 29-AUG-2023 07:54,   No significant change was found     CAROTID US:  Narrative & Impression   EXAM: US CAROTID BILATERAL  LOCATION: [facility]  DATE: 11/22/2023     INDICATION: PRE CABG  COMPARISON: None.  TECHNIQUE: Duplex exam performed utilizing 2D gray-scale imaging, Doppler interrogation with color-flow and spectral waveform analysis. The percent diameter stenosis is determined using NASCET criteria and Society of Radiologists in Ultrasound Consensus   Criteria.     FINDINGS:     RIGHT: Mild plaque at the bifurcation. The peak systolic velocity in the ICA is less than 125 cm/sec, consistent with less than 50% stenosis. Normal velocities in the ECA. Antegrade flow within the vertebral artery.      LEFT: Mild plaque at the bifurcation. The peak systolic velocity in the ICA is less than 125 cm/sec, consistent with less than 50% stenosis. Normal velocities in the ECA. Antegrade flow within the vertebral artery.     VELOCITY CHART:  CCA   Right: 82/26 cm/s   Left: 100/29 cm/s  ICA   Right: 106/40 cm/s   Left: 104/45 cm/s  ECA   Right: 90/13 cm/s   Left: 77/12 cm/s  ICA/CCA PSV Ratio   Right: 1.3   Left: 1.0                                                                      IMPRESSION:  1.  Mild plaque formation, velocities consistent with less than 50% stenosis in the right internal carotid artery.  2.  Mild plaque formation, velocities consistent with less than 50% stenosis in the left internal carotid artery.  3.  Flow within the vertebral arteries is antegrade.

## 2023-11-28 NOTE — PATIENT INSTRUCTIONS
You were seen today in the [facility]    Surgery will be scheduled on December 6 with Dr. Schwab. Pre-admission testing will be on Monday, December 4 at 8 AM.    Please call BERYL Mathew surgery coordinator with any questions.  Thank you.    Priyanka Nunez RNCC  Cardiovascular Surgery  Phone [phone]  Fax [phone]

## 2023-11-28 NOTE — LETTER
11/28/2023    Luis Alfredo Bolanos MD  [address]  [facility] [address]    RE: Royce Feliz       Dear Colleague,     I had the pleasure of seeing Royce Feliz in the [facility].  Cardiac Surgery Consultation      Royce Feliz MRN# [id number]   YOB: 1950 Age: 72 year old        Reason for consult: I was asked by Dr. Zhou to evaluate this patient for severe multivessel coronary artery disease.           Assessment and Plan:   Mr. Feliz is a 72-year-old man with severe multivessel coronary artery disease. I recommend coronary artery bypass grafting. I described the technical details, as well as the expected postoperative course and recovery to the patient. I also discussed the risks and benefits of the procedure. The risks include but are not limited to bleeding, infection, stroke, heart or graft failure with myocardial infarction, dysrhythmia, respiratory failure, kidney or liver injury, bowel or limb ischemia, and death. The calculated STS risk:    Procedure Type: Isolated CABG  Perioperative Outcome Estimate %  Operative Mortality 0.79%  Morbidity & Mortality 4.4%  Stroke 0.734%  Renal Failure 0.677%  Reoperation 1.6%  Prolonged Ventilation 2.16%  Deep Sternal Wound Infection 0.128%  Long Hospital Stay (>14 days) 2.24%  Short Hospital Stay (<6 days) 57.7%    The patient understands these risks and wishes to undergo the operation.       Surgery will be scheduled in the coming weeks.    The preoperative evaluation is complete.    History is obtained from the patient.         History of Present Illness:   This patient is a 72 year old male who presents to discuss the option of coronary artery bypass grafting for treatment of his severe multivessel coronary artery disease. He has a strong family history of premature coronary artery disease, and sadly his brother passed away from a massive MI. The patient has coronary artery disease risk factors including  hypertension, dyslipidemia, and type 2 diabetes mellitus. He underwent a coronary CTA with FFR that demonstrates severe multivessel coronary artery disease with flow limiting stenoses. He has no chest pain or pressure. He denies palpitations. He is quite active and otherwise healthy. He denies heart failure symptoms.                Past Medical History:   Lower extremity fracture treated with closed reduction and fixation  Eye surgery to remove foreign body  Hypertension  Dyslipidemia  Type 2 diabetes mellitus          Past Surgical History:     Past Surgical History:   Procedure Laterality Date    BIOPSY SKIN (LOCATION)      CV CORONARY ANGIOGRAM N/A 11/22/2023    Procedure: Coronary Angiogram;  Surgeon: Sanjay Arredondo MD;  Location: [facility] CV    CV PCI N/A 11/22/2023    Procedure: Percutaneous Coronary Intervention;  Surgeon: Sanjay Arredondo MD;  Location: [facility] CV    EYE SURGERY                 Social History:     Social History     Tobacco Use    Smoking status: Never    Smokeless tobacco: Never   Substance Use Topics    Alcohol use: Not on file             Family History:     Family History   Problem Relation Age of Onset    Cancer Mother     Diabetes Father     Cancer Sister     Varicose Veins Sister     Diabetes Brother              Immunizations:     Immunization History   Administered Date(s) Administered    COVID-19 Monovalent Booster 18+ (Moderna) 11/17/2021    COVID-19 Vaccine (Love) 03/15/2021    HepA, Unspecified 06/28/2007    HepB, Unspecified 06/28/2007    Hepatitis A (ADULT 19+) 06/28/2007    Hepatitis B, Adult 06/28/2007    Influenza (High Dose) 3 valent vaccine 11/25/2015, 11/16/2017    Pneumo Conj 13-V (2010&after) 11/25/2015    Pneumococcal 23 valent 11/16/2017    TDAP (Adacel,Boostrix) 09/25/2019    TDAP Vaccine (Boostrix) 09/25/2019    Typhoid IM 07/11/2007    Typhoid, Unspecified Formulation 07/11/2007    Zoster recombinant adjuvanted (SHINGRIX) 08/01/2018,  10/03/2019             Allergies:   No Known Allergies          Medications:     Current Outpatient Medications Ordered in Epic   Medication    aspirin 81 MG EC tablet    coenzyme Q10 200 mg capsule    ezetimibe (ZETIA) 10 MG tablet    Multiple Vitamin (MULTIVITAMIN ADULT PO)    OMEGA-3/DHA/EPA/FISH OIL (FISH OIL-OMEGA-3 FATTY ACIDS) 300-1,000 mg capsule    omeprazole (PRILOSEC) 20 MG DR capsule    sildenafil (VIAGRA) 50 MG tablet    simvastatin (ZOCOR) 80 MG tablet     No current Epic-ordered facility-administered medications on file.             Review of Systems:     The 10 point Review of Systems is negative other than noted in the HPI            Physical Exam:   Vitals were reviewed      BP: 122/74 Pulse: 72   Resp: 16        Constitutional:   awake, alert, cooperative, no apparent distress, and appears stated age     Eyes:   Lids and lashes normal, pupils equal, round and reactive to light, extra ocular muscles intact, sclera clear, conjunctiva normal     ENT:   normocepalic, without obvious abnormality     Lungs:   no increased work of breathing, good air exchange, and no retractions     Cardiovascular:   regular rate and rhythm     Abdomen:   non-distended     Musculoskeletal:   no lower extremity pitting edema present  there is no redness, warmth, or swelling of the joints  full range of motion noted  motor strength is 5 out of 5 all extremities bilaterally     Neurologic:   Mental Status Exam:  Level of Alertness:   awake  Orientation:   person, place, time  Cranial Nerves:  cranial nerves II-XII are grossly intact  Motor Exam:  moves all extremities well and symmetrically     Neuropsychiatric:   General: normal, calm, and normal eye contact  Level of consciousness: alert / normal  Affect: normal     Skin:   no bruising or bleeding, normal skin color, texture, turgor, and no redness, warmth, or swelling          Data:   All laboratory data reviewed  All cardiac studies reviewed by me.  All imaging studies  reviewed by me.    CARDIAC CATHETERIZATION:    Mid LM to Dist LM lesion is 40% stenosed.    Prox LAD lesion is 70% stenosed.    Prox LAD to Mid LAD lesion is 60% stenosed.    Mid LAD lesion is 75% stenosed.    1st Diag lesion is 70% stenosed.    Prox RCA lesion is 40% stenosed.    Dist RCA lesion is 90% stenosed.    RPDA lesion is 80% stenosed.    Mid RCA lesion is 30% stenosed.     1.  Severe calcific multivessel coronary disease  2.  Sequential calcified stenoses proximal to mid LAD, involving takeoff of large first diagonal branch.  3.  Diffuse severe right coronary calcification with moderate stenosis proximal third and severe stenosis distally proximal to the crux.  Moderately severe stenosis mid PDA, with a large posterolateral branch that appears widely patent.  4.  Left circumflex is a very small system.  5.  Normal LVEDP    TRANSTHORACIC ECHOCARDIOGRAPHY:  1. The left ventricle is normal in size. Left ventricular function is  normal.The ejection fraction is 60-65%. There is normal left ventricular wall  thickness. Left ventricular diastolic function is normal. No regional wall  motion abnormalities noted.  2. Normal right ventricle size and systolic function.  3. No hemodynamically significant valvular abnormalities on 2D or color flow  imaging.    CT FFR:  1. The 70-99 % stenosis in the left anterior descending coronary artery proximal to mid segment has a high likelihood of lesion specific ischemia with an FFRct value of 0.67.  2. The 70-99 % stenosis in the right coronary artery mid to distal segment has a high likelihood of lesion specific ischemia with an FFRct value of 0.71.    FFRct Values Summary:  >0.80 (distal to the stenosis): Low likelihood of lesion-specific ischemia  <= 0.80 (distal to the stenosis): High likelihood of lesion-specific ischemia  Assessment of lesion-specific ischemia by FFRct should rely on FFRct values measured approximately 10-15 mm distal to the lesion of interest, rather  than from the terminal vessel. FFRct values <0.80 measured from the terminal vessel do not necessarily indicate lesion-specific ischemia, particularly if there is only mild disease in the more proximal segments of the vessel.     EKG:  Sinus rhythm   Normal ECG   When compared with ECG of 29-AUG-2023 07:54,   No significant change was found     CAROTID US:  Narrative & Impression   EXAM: US CAROTID BILATERAL  LOCATION: [facility]  DATE: 11/22/2023     INDICATION: PRE CABG  COMPARISON: None.  TECHNIQUE: Duplex exam performed utilizing 2D gray-scale imaging, Doppler interrogation with color-flow and spectral waveform analysis. The percent diameter stenosis is determined using NASCET criteria and Society of Radiologists in Ultrasound Consensus   Criteria.     FINDINGS:     RIGHT: Mild plaque at the bifurcation. The peak systolic velocity in the ICA is less than 125 cm/sec, consistent with less than 50% stenosis. Normal velocities in the ECA. Antegrade flow within the vertebral artery.      LEFT: Mild plaque at the bifurcation. The peak systolic velocity in the ICA is less than 125 cm/sec, consistent with less than 50% stenosis. Normal velocities in the ECA. Antegrade flow within the vertebral artery.     VELOCITY CHART:  CCA   Right: 82/26 cm/s   Left: 100/29 cm/s  ICA   Right: 106/40 cm/s   Left: 104/45 cm/s  ECA   Right: 90/13 cm/s   Left: 77/12 cm/s  ICA/CCA PSV Ratio   Right: 1.3   Left: 1.0                                                                      IMPRESSION:  1.  Mild plaque formation, velocities consistent with less than 50% stenosis in the right internal carotid artery.  2.  Mild plaque formation, velocities consistent with less than 50% stenosis in the left internal carotid artery.  3.  Flow within the vertebral arteries is antegrade.           Thank you for allowing me to participate in the care of your patient.      Sincerely,     Rene Schwab MD     [facility]  [facility]  cc:   Karlene Payton, CNP  [address] [place] [address]

## 2023-12-04 ENCOUNTER — DOCUMENTATION ONLY (OUTPATIENT)
Dept: OTHER | Facility: CLINIC | Age: 73
End: 2023-12-04

## 2023-12-04 ENCOUNTER — ANESTHESIA EVENT (OUTPATIENT)
Dept: SURGERY | Facility: HOSPITAL | Age: 73
DRG: 236 | End: 2023-12-04
Payer: COMMERCIAL

## 2023-12-04 ENCOUNTER — HOSPITAL ENCOUNTER (OUTPATIENT)
Dept: SURGERY | Facility: HOSPITAL | Age: 73
Discharge: HOME | DRG: 236 | End: 2023-12-04
Attending: SURGERY | Admitting: SURGERY
Payer: COMMERCIAL

## 2023-12-04 VITALS — BODY MASS INDEX: 29.62 KG/M2 | WEIGHT: 178 LBS

## 2023-12-04 DIAGNOSIS — R93.1 ABNORMAL CARDIAC CT ANGIOGRAPHY: ICD-10-CM

## 2023-12-04 DIAGNOSIS — I25.10 CORONARY ARTERY DISEASE DUE TO CALCIFIED CORONARY LESION: ICD-10-CM

## 2023-12-04 DIAGNOSIS — I25.119 CORONARY ARTERY DISEASE INVOLVING NATIVE CORONARY ARTERY OF NATIVE HEART WITH ANGINA PECTORIS (H): ICD-10-CM

## 2023-12-04 DIAGNOSIS — I25.84 CORONARY ARTERY DISEASE DUE TO CALCIFIED CORONARY LESION: ICD-10-CM

## 2023-12-04 LAB
ABO/RH(D): NORMAL
ALBUMIN UR-MCNC: NEGATIVE MG/DL
ANION GAP SERPL CALCULATED.3IONS-SCNC: 8 MMOL/L (ref 7–15)
ANTIBODY SCREEN: NEGATIVE
APPEARANCE UR: CLEAR
APTT PPP: 34 SECONDS (ref 22–38)
BILIRUB UR QL STRIP: NEGATIVE
BUN SERPL-MCNC: 20.9 MG/DL (ref 8–23)
CALCIUM SERPL-MCNC: 9.6 MG/DL (ref 8.8–10.2)
CHLORIDE SERPL-SCNC: 106 MMOL/L (ref 98–107)
COLOR UR AUTO: ABNORMAL
CREAT SERPL-MCNC: 1.2 MG/DL (ref 0.67–1.17)
DEPRECATED HCO3 PLAS-SCNC: 27 MMOL/L (ref 22–29)
EGFRCR SERPLBLD CKD-EPI 2021: 63 ML/MIN/1.73M2
ERYTHROCYTE [DISTWIDTH] IN BLOOD BY AUTOMATED COUNT: 13.4 % (ref 10–15)
GLUCOSE SERPL-MCNC: 106 MG/DL (ref 70–99)
GLUCOSE UR STRIP-MCNC: NEGATIVE MG/DL
HBA1C MFR BLD: 5.9 %
HCT VFR BLD AUTO: 41.7 % (ref 40–53)
HGB BLD-MCNC: 13.9 G/DL (ref 13.3–17.7)
HGB UR QL STRIP: NEGATIVE
INR PPP: 0.93 (ref 0.85–1.15)
KETONES UR STRIP-MCNC: NEGATIVE MG/DL
LEUKOCYTE ESTERASE UR QL STRIP: NEGATIVE
MAGNESIUM SERPL-MCNC: 1.9 MG/DL (ref 1.7–2.3)
MCH RBC QN AUTO: 31.7 PG (ref 26.5–33)
MCHC RBC AUTO-ENTMCNC: 33.3 G/DL (ref 31.5–36.5)
MCV RBC AUTO: 95 FL (ref 78–100)
MUCOUS THREADS #/AREA URNS LPF: PRESENT /LPF
NITRATE UR QL: NEGATIVE
PH UR STRIP: 5.5 [PH] (ref 5–7)
PLATELET # BLD AUTO: 174 10E3/UL (ref 150–450)
POTASSIUM SERPL-SCNC: 4.4 MMOL/L (ref 3.4–5.3)
PREALB SERPL-MCNC: 29 MG/DL (ref 20–40)
RBC # BLD AUTO: 4.38 10E6/UL (ref 4.4–5.9)
RBC URINE: <1 /HPF
SODIUM SERPL-SCNC: 141 MMOL/L (ref 135–145)
SP GR UR STRIP: 1.02 (ref 1–1.03)
SPECIMEN EXPIRATION DATE: NORMAL
UROBILINOGEN UR STRIP-MCNC: <2 MG/DL
WBC # BLD AUTO: 7.1 10E3/UL (ref 4–11)
WBC URINE: 0 /HPF

## 2023-12-04 PROCEDURE — 85610 PROTHROMBIN TIME: CPT | Performed by: SURGERY

## 2023-12-04 PROCEDURE — 80048 BASIC METABOLIC PNL TOTAL CA: CPT | Performed by: INTERNAL MEDICINE

## 2023-12-04 PROCEDURE — 86850 RBC ANTIBODY SCREEN: CPT | Performed by: SURGERY

## 2023-12-04 PROCEDURE — 83735 ASSAY OF MAGNESIUM: CPT | Performed by: SURGERY

## 2023-12-04 PROCEDURE — 83036 HEMOGLOBIN GLYCOSYLATED A1C: CPT | Performed by: SURGERY

## 2023-12-04 PROCEDURE — 81001 URINALYSIS AUTO W/SCOPE: CPT | Performed by: SURGERY

## 2023-12-04 PROCEDURE — 85027 COMPLETE CBC AUTOMATED: CPT | Performed by: INTERNAL MEDICINE

## 2023-12-04 PROCEDURE — 36415 COLL VENOUS BLD VENIPUNCTURE: CPT | Performed by: SURGERY

## 2023-12-04 PROCEDURE — 84134 ASSAY OF PREALBUMIN: CPT | Performed by: SURGERY

## 2023-12-04 PROCEDURE — 85730 THROMBOPLASTIN TIME PARTIAL: CPT | Performed by: SURGERY

## 2023-12-04 PROCEDURE — 86901 BLOOD TYPING SEROLOGIC RH(D): CPT | Performed by: SURGERY

## 2023-12-04 NOTE — ANESTHESIA PREPROCEDURE EVALUATION
Anesthesia Pre-Procedure Evaluation    Patient: Royce Feliz   MRN: [id number] : 1950        Procedure : Procedure(s):  CORONARY ARTERY BYPASS GRAFT, INTERNAL MAMMARY ARTERY HARVEST, ENDOSCOPIC VESSEL PROCUREMENT,  ANESTHESIA TRANSESOPHAGEAL ECHOCARDIOGRAM  POSSIBLE LEFT RADIAL ARTERY HARVEST          Past Medical History:   Diagnosis Date     Hypertension      Sleep apnea       Past Surgical History:   Procedure Laterality Date     BIOPSY SKIN (LOCATION)       CV CORONARY ANGIOGRAM N/A 2023    Procedure: Coronary Angiogram;  Surgeon: Sanjay Arredondo MD;  Location: [facility] CV     CV PCI N/A 2023    Procedure: Percutaneous Coronary Intervention;  Surgeon: Sanjay Arredondo MD;  Location: [facility] CV     EYE SURGERY        No Known Allergies   Social History     Tobacco Use     Smoking status: Never     Smokeless tobacco: Never   Substance Use Topics     Alcohol use: Yes     Comment: several dyas a week      Wt Readings from Last 1 Encounters:   23 80.7 kg (178 lb)        Anesthesia Evaluation            ROS/MED HX  ENT/Pulmonary:     (+) sleep apnea, doesn't use CPAP,                                     Neurologic:  - neg neurologic ROS     Cardiovascular: Comment: Echo:  1. The left ventricle is normal in size. Left ventricular function is  normal.The ejection fraction is 60-65%. There is normal left ventricular wall  thickness. Left ventricular diastolic function is normal. No regional wall  motion abnormalities noted.  2. Normal right ventricle size and systolic function.  3. No hemodynamically significant valvular abnormalities on 2D or color flow  imaging.    Coronary Cath  1.  Severe calcific multivessel coronary disease  2.  Sequential calcified stenoses proximal to mid LAD, involving takeoff of large first diagonal branch.  3.  Diffuse severe right coronary calcification with moderate stenosis proximal third and severe stenosis distally proximal to the  "crux.  Moderately severe stenosis mid PDA, with a large posterolateral branch that appears widely patent.  4.  Left circumflex is a very small system.  5.  Normal LVEDP         (+)  hypertension- -  CAD -  - -                                      METS/Exercise Tolerance: >4 METS    Hematologic:  - neg hematologic  ROS     Musculoskeletal:  - neg musculoskeletal ROS     GI/Hepatic:  - neg GI/hepatic ROS     Renal/Genitourinary:     (+) renal disease, type: CRI,            Endo:  - neg endo ROS     Psychiatric/Substance Use:  - neg psychiatric ROS     Infectious Disease:  - neg infectious disease ROS     Malignancy:       Other:            Physical Exam    Airway  airway exam normal      Mallampati: III   TM distance: > 3 FB   Neck ROM: full   Mouth opening: > 3 cm    Respiratory Devices and Support         Dental       (+) Minor Abnormalities - some fillings, tiny chips      Cardiovascular   cardiovascular exam normal          Pulmonary   pulmonary exam normal              OUTSIDE LABS:  CBC:   Lab Results   Component Value Date    WBC 7.1 12/04/2023    WBC 7.4 11/22/2023    HGB 13.9 12/04/2023    HGB 14.3 11/22/2023    HCT 41.7 12/04/2023    HCT 43.0 11/22/2023     12/04/2023     11/22/2023     BMP:   Lab Results   Component Value Date     12/04/2023     11/22/2023    POTASSIUM 4.4 12/04/2023    POTASSIUM 4.2 11/22/2023    CHLORIDE 106 12/04/2023    CHLORIDE 103 11/22/2023    CO2 27 12/04/2023    CO2 26 11/22/2023    BUN 20.9 12/04/2023    BUN 22.2 11/22/2023    CR 1.20 (H) 12/04/2023    CR 1.12 11/22/2023     (H) 12/04/2023     (H) 11/22/2023     COAGS:   Lab Results   Component Value Date    PTT 34 12/04/2023    INR 0.93 12/04/2023     POC: No results found for: \"BGM\", \"HCG\", \"HCGS\"  HEPATIC:   Lab Results   Component Value Date    ALBUMIN 4.6 08/29/2023    PROTTOTAL 7.1 08/29/2023    ALT 30 08/29/2023    AST 28 08/29/2023    ALKPHOS 78 08/29/2023    BILITOTAL 0.5 08/29/2023 " "    OTHER:   Lab Results   Component Value Date    A1C 5.9 (H) 12/04/2023    GADIEL 9.6 12/04/2023    MAG 1.9 12/04/2023       Anesthesia Plan    ASA Status:  3    NPO Status:  NPO Appropriate    Anesthesia Type: General.     - Airway: ETT   Induction: Intravenous.   Maintenance: Balanced.   Techniques and Equipment:     - Airway: Video-Laryngoscope     - Lines/Monitors: 2nd IV, Arterial Line, Central Line, CVP, BIS, NIRS, DARIELA            DARIELA Absolute Contra-indication: NONE            DARIELA Relative Contra-indication: NONE     - Blood: Blood in Room     - Drips/Meds: Dexmed. infusion, Ketamine, Phenylephrine, Epinephrine     Consents    Anesthesia Plan(s) and associated risks, benefits, and realistic alternatives discussed. Questions answered and patient/representative(s) expressed understanding.     - Discussed: Risks, Benefits and Alternatives for BOTH SEDATION and the PROCEDURE were discussed     - Discussed with:  Patient, Spouse      - Extended Intubation/Ventilatory Support Discussed: Yes (Planned continued intubation upon transfer to ICU).      - Patient is DNR/DNI Status: No     Use of blood products discussed: Yes.     - Discussed with: Patient.     - Consented: consented to blood products     Postoperative Care    Pain management: Multi-modal analgesia.     - Plan for long acting post-op opioid use   PONV prophylaxis: Ondansetron (or other 5HT-3), Dexamethasone or Solumedrol     Comments:    Other Comments: Art line pre induction  GETA  2 PIV, CVC  DARIELA      Methadone  Ketamine  Magnesium           Leti Watters MD    I have reviewed the pertinent notes and labs in the chart from the past 30 days and (re)examined the patient.  Any updates or changes from those notes are reflected in this note.              # Overweight: Estimated body mass index is 29.62 kg/m  as calculated from the following:    Height as of 11/22/23: 1.651 m (5' 5\").    Weight as of 12/4/23: 80.7 kg (178 lb).      "

## 2023-12-04 NOTE — PROGRESS NOTES
Pharmacist Admission Medication History    Admission medication history is complete. The information provided in this note is only as accurate as the sources available at the time of the update.    Information Source(s): Patient, Clinic records, and CareEverywhere/SureScripts via in-person    Pertinent Information: none    Allergies reviewed with patient and updates made in EHR: yes    Medication History Completed By: Richie Santana [facility] 12/4/2023 9:09 AM    PTA Med List   Medication Sig Last Dose    aspirin 81 MG EC tablet [ASPIRIN 81 MG EC TABLET] Take 1 tablet by mouth.  at PM    coenzyme Q10 200 mg capsule [COENZYME Q10 200 MG CAPSULE] Take 200 mg by mouth daily.  at AM    ezetimibe (ZETIA) 10 MG tablet One daily  at PM    Multiple Vitamin (MULTIVITAMIN ADULT PO) Take 1 tablet by mouth daily  at AM    OMEGA-3/DHA/EPA/FISH OIL (FISH OIL-OMEGA-3 FATTY ACIDS) 300-1,000 mg capsule Take 2 g by mouth daily  at AM    omeprazole (PRILOSEC) 20 MG DR capsule TAKE ONE CAPSULE BY MOUTH DAILY  at AM    sildenafil (VIAGRA) 50 MG tablet [SILDENAFIL (VIAGRA) 50 MG TABLET] TAKE 1 TABLET BY MOUTH 1 HOUR BEFORE NEEDED     simvastatin (ZOCOR) 80 MG tablet TAKE 1 TABLET BY MOUTH DAILY  at PM

## 2023-12-06 ENCOUNTER — ANESTHESIA (OUTPATIENT)
Dept: SURGERY | Facility: HOSPITAL | Age: 73
DRG: 236 | End: 2023-12-06
Payer: COMMERCIAL

## 2023-12-06 ENCOUNTER — HOSPITAL ENCOUNTER (INPATIENT)
Facility: HOSPITAL | Age: 73
LOS: 5 days | Discharge: HOME | DRG: 236 | End: 2023-12-11
Attending: SURGERY | Admitting: SURGERY
Payer: COMMERCIAL

## 2023-12-06 ENCOUNTER — APPOINTMENT (OUTPATIENT)
Dept: RADIOLOGY | Facility: HOSPITAL | Age: 73
DRG: 236 | End: 2023-12-06
Attending: PHYSICIAN ASSISTANT
Payer: COMMERCIAL

## 2023-12-06 DIAGNOSIS — Z95.1 S/P CABG (CORONARY ARTERY BYPASS GRAFT): Primary | ICD-10-CM

## 2023-12-06 DIAGNOSIS — I25.84 CORONARY ARTERY DISEASE DUE TO CALCIFIED CORONARY LESION: ICD-10-CM

## 2023-12-06 DIAGNOSIS — I25.10 CORONARY ARTERY DISEASE DUE TO CALCIFIED CORONARY LESION: ICD-10-CM

## 2023-12-06 LAB
ALBUMIN SERPL BCG-MCNC: 3.7 G/DL (ref 3.5–5.2)
ALP SERPL-CCNC: 60 U/L (ref 40–150)
ALT SERPL W P-5'-P-CCNC: 24 U/L (ref 0–70)
ANION GAP SERPL CALCULATED.3IONS-SCNC: 8 MMOL/L (ref 7–15)
APTT PPP: 43 SECONDS (ref 22–38)
APTT PPP: 57 SECONDS (ref 22–38)
AST SERPL W P-5'-P-CCNC: 39 U/L (ref 0–45)
ATRIAL RATE - MUSE: 100 BPM
BASE EXCESS BLDA CALC-SCNC: -4.4 MMOL/L
BASE EXCESS BLDA CALC-SCNC: -5.6 MMOL/L
BASE EXCESS BLDA CALC-SCNC: 0.1 MMOL/L
BASE EXCESS BLDA CALC-SCNC: 1 MMOL/L
BASE EXCESS BLDA CALC-SCNC: 1.2 MMOL/L
BASE EXCESS BLDV CALC-SCNC: 1.3 MMOL/L
BILIRUB SERPL-MCNC: 0.5 MG/DL
BLD PROD TYP BPU: NORMAL
BLD PROD TYP BPU: NORMAL
BLOOD COMPONENT TYPE: NORMAL
BLOOD COMPONENT TYPE: NORMAL
BUN SERPL-MCNC: 18 MG/DL (ref 8–23)
CA-I BLD-MCNC: 0.94 MMOL/L (ref 1.11–1.3)
CA-I BLD-MCNC: 1.05 MMOL/L (ref 1.11–1.3)
CA-I BLD-MCNC: 1.06 MMOL/L (ref 1.11–1.3)
CA-I BLD-MCNC: 1.07 MMOL/L (ref 1.11–1.3)
CA-I BLD-MCNC: 1.36 MMOL/L (ref 1.11–1.3)
CALCIUM SERPL-MCNC: 8.8 MG/DL (ref 8.8–10.2)
CALCIUM, IONIZED MEASURED: 1.13 MMOL/L (ref 1.11–1.3)
CALCIUM, IONIZED MEASURED: 1.15 MMOL/L (ref 1.11–1.3)
CHLORIDE SERPL-SCNC: 110 MMOL/L (ref 98–107)
CODING SYSTEM: NORMAL
CODING SYSTEM: NORMAL
COHGB MFR BLD: 100 % (ref 95–96)
COHGB MFR BLD: 97.4 % (ref 95–96)
COHGB MFR BLD: 97.7 % (ref 95–96)
COHGB MFR BLD: 98.7 % (ref 95–96)
COHGB MFR BLD: 98.9 % (ref 95–96)
CREAT SERPL-MCNC: 1.12 MG/DL (ref 0.67–1.17)
CROSSMATCH: NORMAL
CROSSMATCH: NORMAL
DEPRECATED HCO3 PLAS-SCNC: 23 MMOL/L (ref 22–29)
DIASTOLIC BLOOD PRESSURE - MUSE: NORMAL MMHG
EGFRCR SERPLBLD CKD-EPI 2021: 69 ML/MIN/1.73M2
ERYTHROCYTE [DISTWIDTH] IN BLOOD BY AUTOMATED COUNT: 13.7 % (ref 10–15)
ERYTHROCYTE [DISTWIDTH] IN BLOOD BY AUTOMATED COUNT: 13.7 % (ref 10–15)
FIBRINOGEN PPP-MCNC: 281 MG/DL (ref 170–490)
GLUCOSE BLD-MCNC: 120 MG/DL (ref 70–125)
GLUCOSE BLD-MCNC: 141 MG/DL (ref 70–125)
GLUCOSE BLD-MCNC: 162 MG/DL (ref 70–125)
GLUCOSE BLD-MCNC: 164 MG/DL (ref 70–125)
GLUCOSE BLD-MCNC: 92 MG/DL (ref 70–125)
GLUCOSE BLDC GLUCOMTR-MCNC: 101 MG/DL (ref 70–99)
GLUCOSE BLDC GLUCOMTR-MCNC: 113 MG/DL (ref 70–99)
GLUCOSE BLDC GLUCOMTR-MCNC: 116 MG/DL (ref 70–99)
GLUCOSE BLDC GLUCOMTR-MCNC: 125 MG/DL (ref 70–99)
GLUCOSE BLDC GLUCOMTR-MCNC: 128 MG/DL (ref 70–99)
GLUCOSE BLDC GLUCOMTR-MCNC: 132 MG/DL (ref 70–99)
GLUCOSE BLDC GLUCOMTR-MCNC: 142 MG/DL (ref 70–99)
GLUCOSE BLDC GLUCOMTR-MCNC: 147 MG/DL (ref 70–99)
GLUCOSE BLDC GLUCOMTR-MCNC: 155 MG/DL (ref 70–99)
GLUCOSE BLDC GLUCOMTR-MCNC: 175 MG/DL (ref 70–99)
GLUCOSE BLDC GLUCOMTR-MCNC: 184 MG/DL (ref 70–99)
GLUCOSE BLDC GLUCOMTR-MCNC: 92 MG/DL (ref 70–99)
GLUCOSE SERPL-MCNC: 195 MG/DL (ref 70–99)
HCO3 BLD-SCNC: 22 MMOL/L (ref 23–29)
HCO3 BLDA-SCNC: 21 MMOL/L (ref 23–29)
HCO3 BLDA-SCNC: 24 MMOL/L (ref 23–29)
HCO3 BLDA-SCNC: 25 MMOL/L (ref 23–29)
HCO3 BLDA-SCNC: 25 MMOL/L (ref 23–29)
HCO3 BLDV-SCNC: 25 MMOL/L (ref 24–30)
HCT VFR BLD AUTO: 33.2 % (ref 40–53)
HCT VFR BLD AUTO: 34.8 % (ref 40–53)
HGB BLD-MCNC: 10 G/DL (ref 13.3–17.7)
HGB BLD-MCNC: 10.1 G/DL (ref 13.3–17.7)
HGB BLD-MCNC: 10.5 G/DL (ref 13.3–17.7)
HGB BLD-MCNC: 11.1 G/DL (ref 13.3–17.7)
HGB BLD-MCNC: 11.4 G/DL (ref 13.3–17.7)
HGB BLD-MCNC: 11.5 G/DL (ref 13.3–17.7)
HGB BLD-MCNC: 11.7 G/DL (ref 13.3–17.7)
INR PPP: 1.17 (ref 0.85–1.15)
INR PPP: 1.33 (ref 0.85–1.15)
INTERPRETATION ECG - MUSE: NORMAL
ION CA PH 7.4: 1.1 MMOL/L (ref 1.11–1.3)
ION CA PH 7.4: 1.12 MMOL/L (ref 1.11–1.3)
ISSUE DATE AND TIME: NORMAL
ISSUE DATE AND TIME: NORMAL
LACTATE BLD-SCNC: 0.8 MMOL/L (ref 0.7–2)
LACTATE BLD-SCNC: 0.9 MMOL/L (ref 0.7–2)
LACTATE BLD-SCNC: 1 MMOL/L (ref 0.7–2)
LACTATE BLD-SCNC: 1.4 MMOL/L (ref 0.7–2)
LACTATE BLD-SCNC: 1.7 MMOL/L (ref 0.7–2)
LACTATE SERPL-SCNC: 2.2 MMOL/L (ref 0.7–2)
MAGNESIUM SERPL-MCNC: 2.8 MG/DL (ref 1.7–2.3)
MCH RBC QN AUTO: 31.7 PG (ref 26.5–33)
MCH RBC QN AUTO: 31.8 PG (ref 26.5–33)
MCHC RBC AUTO-ENTMCNC: 33 G/DL (ref 31.5–36.5)
MCHC RBC AUTO-ENTMCNC: 33.4 G/DL (ref 31.5–36.5)
MCV RBC AUTO: 95 FL (ref 78–100)
MCV RBC AUTO: 96 FL (ref 78–100)
OXYHGB MFR BLD: 97 % (ref 95–96)
P AXIS - MUSE: 52 DEGREES
PCO2 BLD: 41 MM HG (ref 35–45)
PCO2 BLDA: 28 MM HG (ref 35–45)
PCO2 BLDA: 46 MM HG (ref 35–45)
PCO2 BLDA: 48 MM HG (ref 35–45)
PCO2 BLDA: 49 MM HG (ref 35–45)
PCO2 BLDV: 61 MM HG (ref 35–50)
PH BLD: 7.33 [PH] (ref 7.37–7.44)
PH BLDA: 7.34 [PH] (ref 7.37–7.44)
PH BLDA: 7.35 [PH] (ref 7.37–7.44)
PH BLDA: 7.36 [PH] (ref 7.37–7.44)
PH BLDA: 7.44 [PH] (ref 7.37–7.44)
PH BLDV: 7.27 [PH] (ref 7.35–7.45)
PH: 7.35 (ref 7.35–7.45)
PH: 7.36 (ref 7.35–7.45)
PHOSPHATE SERPL-MCNC: 3 MG/DL (ref 2.5–4.5)
PLATELET # BLD AUTO: 142 10E3/UL (ref 150–450)
PLATELET # BLD AUTO: 161 10E3/UL (ref 150–450)
PO2 BLD: 123 MM HG (ref 75–85)
PO2 BLDA: 148 MM HG (ref 75–85)
PO2 BLDA: 307 MM HG (ref 75–85)
PO2 BLDA: 372 MM HG (ref 75–85)
PO2 BLDA: 408 MM HG (ref 75–85)
PO2 BLDV: 52 MM HG (ref 25–47)
POTASSIUM BLD-SCNC: 2.9 MMOL/L (ref 3.5–5)
POTASSIUM BLD-SCNC: 4.5 MMOL/L (ref 3.5–5)
POTASSIUM BLD-SCNC: 4.8 MMOL/L (ref 3.5–5)
POTASSIUM BLD-SCNC: 5 MMOL/L (ref 3.5–5)
POTASSIUM BLD-SCNC: 5.2 MMOL/L (ref 3.5–5)
POTASSIUM SERPL-SCNC: 4.1 MMOL/L (ref 3.4–5.3)
POTASSIUM SERPL-SCNC: 4.1 MMOL/L (ref 3.4–5.3)
PR INTERVAL - MUSE: 164 MS
PROT SERPL-MCNC: 5.2 G/DL (ref 6.4–8.3)
QRS DURATION - MUSE: 88 MS
QT - MUSE: 336 MS
QTC - MUSE: 433 MS
R AXIS - MUSE: 44 DEGREES
RBC # BLD AUTO: 3.49 10E6/UL (ref 4.4–5.9)
RBC # BLD AUTO: 3.63 10E6/UL (ref 4.4–5.9)
SATV LHE POCT: 80.8 % (ref 70–75)
SODIUM BLD-SCNC: 138 MMOL/L (ref 135–145)
SODIUM BLD-SCNC: 138 MMOL/L (ref 135–145)
SODIUM BLD-SCNC: 139 MMOL/L (ref 135–145)
SODIUM BLD-SCNC: 140 MMOL/L (ref 135–145)
SODIUM BLD-SCNC: 143 MMOL/L (ref 135–145)
SODIUM SERPL-SCNC: 141 MMOL/L (ref 135–145)
SYSTOLIC BLOOD PRESSURE - MUSE: NORMAL MMHG
T AXIS - MUSE: 35 DEGREES
TEMPERATURE: 37 DEGREES C
UNIT ABO/RH: NORMAL
UNIT ABO/RH: NORMAL
UNIT NUMBER: NORMAL
UNIT NUMBER: NORMAL
UNIT STATUS: NORMAL
UNIT STATUS: NORMAL
UNIT TYPE ISBT: 5100
UNIT TYPE ISBT: 5100
VENTRICULAR RATE- MUSE: 100 BPM
WBC # BLD AUTO: 22 10E3/UL (ref 4–11)
WBC # BLD AUTO: 23 10E3/UL (ref 4–11)

## 2023-12-06 PROCEDURE — 250N000013 HC RX MED GY IP 250 OP 250 PS 637: Performed by: SURGERY

## 2023-12-06 PROCEDURE — P9016 RBC LEUKOCYTES REDUCED: HCPCS | Performed by: SURGERY

## 2023-12-06 PROCEDURE — 250N000009 HC RX 250

## 2023-12-06 PROCEDURE — 83605 ASSAY OF LACTIC ACID: CPT | Performed by: PHYSICIAN ASSISTANT

## 2023-12-06 PROCEDURE — 85610 PROTHROMBIN TIME: CPT | Performed by: PHYSICIAN ASSISTANT

## 2023-12-06 PROCEDURE — 250N000011 HC RX IP 250 OP 636: Performed by: SURGERY

## 2023-12-06 PROCEDURE — 272N000001 HC OR GENERAL SUPPLY STERILE: Performed by: SURGERY

## 2023-12-06 PROCEDURE — 999N000253 HC STATISTIC WEANING TRIALS

## 2023-12-06 PROCEDURE — 410N000004: Performed by: SURGERY

## 2023-12-06 PROCEDURE — 93010 ELECTROCARDIOGRAM REPORT: CPT | Performed by: INTERNAL MEDICINE

## 2023-12-06 PROCEDURE — 85730 THROMBOPLASTIN TIME PARTIAL: CPT | Performed by: SURGERY

## 2023-12-06 PROCEDURE — 250N000009 HC RX 250: Performed by: SURGERY

## 2023-12-06 PROCEDURE — 360N000079 HC SURGERY LEVEL 6, PER MIN: Performed by: SURGERY

## 2023-12-06 PROCEDURE — 258N000003 HC RX IP 258 OP 636: Performed by: ANESTHESIOLOGY

## 2023-12-06 PROCEDURE — 250N000012 HC RX MED GY IP 250 OP 636 PS 637: Performed by: ANESTHESIOLOGY

## 2023-12-06 PROCEDURE — C1760 CLOSURE DEV, VASC: HCPCS | Performed by: SURGERY

## 2023-12-06 PROCEDURE — 85041 AUTOMATED RBC COUNT: CPT | Performed by: PHYSICIAN ASSISTANT

## 2023-12-06 PROCEDURE — 999N000156 HC STATISTIC RCP CONSULT EA 30 MIN

## 2023-12-06 PROCEDURE — 33533 CABG ARTERIAL SINGLE: CPT | Performed by: SURGERY

## 2023-12-06 PROCEDURE — 06BQ4ZZ EXCISION OF LEFT SAPHENOUS VEIN, PERCUTANEOUS ENDOSCOPIC APPROACH: ICD-10-PCS | Performed by: SURGERY

## 2023-12-06 PROCEDURE — 94799 UNLISTED PULMONARY SVC/PX: CPT

## 2023-12-06 PROCEDURE — 250N000012 HC RX MED GY IP 250 OP 636 PS 637: Mod: JZ | Performed by: SURGERY

## 2023-12-06 PROCEDURE — 85027 COMPLETE CBC AUTOMATED: CPT | Performed by: SURGERY

## 2023-12-06 PROCEDURE — 83735 ASSAY OF MAGNESIUM: CPT | Performed by: PHYSICIAN ASSISTANT

## 2023-12-06 PROCEDURE — 82330 ASSAY OF CALCIUM: CPT | Performed by: PHYSICIAN ASSISTANT

## 2023-12-06 PROCEDURE — 999N000259 HC STATISTIC EXTUBATION

## 2023-12-06 PROCEDURE — 250N000013 HC RX MED GY IP 250 OP 250 PS 637: Performed by: PHYSICIAN ASSISTANT

## 2023-12-06 PROCEDURE — 82330 ASSAY OF CALCIUM: CPT | Performed by: SURGERY

## 2023-12-06 PROCEDURE — 85730 THROMBOPLASTIN TIME PARTIAL: CPT | Performed by: PHYSICIAN ASSISTANT

## 2023-12-06 PROCEDURE — 250N000009 HC RX 250: Performed by: ANESTHESIOLOGY

## 2023-12-06 PROCEDURE — 258N000003 HC RX IP 258 OP 636: Performed by: SURGERY

## 2023-12-06 PROCEDURE — 99291 CRITICAL CARE FIRST HOUR: CPT | Performed by: INTERNAL MEDICINE

## 2023-12-06 PROCEDURE — 272N000202 HC AEROBIKA WITH MANOMETER

## 2023-12-06 PROCEDURE — 999N000055 HC STATISTIC END TITIAL CO2 MONITORING

## 2023-12-06 PROCEDURE — 250N000011 HC RX IP 250 OP 636: Performed by: ANESTHESIOLOGY

## 2023-12-06 PROCEDURE — 93005 ELECTROCARDIOGRAM TRACING: CPT

## 2023-12-06 PROCEDURE — 250N000011 HC RX IP 250 OP 636: Performed by: PHYSICIAN ASSISTANT

## 2023-12-06 PROCEDURE — 85384 FIBRINOGEN ACTIVITY: CPT | Performed by: SURGERY

## 2023-12-06 PROCEDURE — 200N000001 HC R&B ICU

## 2023-12-06 PROCEDURE — 33519 CABG ARTERY-VEIN THREE: CPT | Performed by: SURGERY

## 2023-12-06 PROCEDURE — 84295 ASSAY OF SERUM SODIUM: CPT

## 2023-12-06 PROCEDURE — 0210098 BYPASS CORONARY ARTERY, ONE ARTERY FROM RIGHT INTERNAL MAMMARY WITH AUTOLOGOUS VENOUS TISSUE, OPEN APPROACH: ICD-10-PCS | Performed by: SURGERY

## 2023-12-06 PROCEDURE — 250N000011 HC RX IP 250 OP 636: Mod: JZ | Performed by: ANESTHESIOLOGY

## 2023-12-06 PROCEDURE — C1713 ANCHOR/SCREW BN/BN,TIS/BN: HCPCS | Performed by: SURGERY

## 2023-12-06 PROCEDURE — 410N000003 HC PER-PERFUSION 1ST 30 MIN: Performed by: SURGERY

## 2023-12-06 PROCEDURE — 85610 PROTHROMBIN TIME: CPT | Performed by: SURGERY

## 2023-12-06 PROCEDURE — 258N000003 HC RX IP 258 OP 636: Performed by: PHYSICIAN ASSISTANT

## 2023-12-06 PROCEDURE — 5A1221Z PERFORMANCE OF CARDIAC OUTPUT, CONTINUOUS: ICD-10-PCS | Performed by: SURGERY

## 2023-12-06 PROCEDURE — 021209W BYPASS CORONARY ARTERY, THREE ARTERIES FROM AORTA WITH AUTOLOGOUS VENOUS TISSUE, OPEN APPROACH: ICD-10-PCS | Performed by: SURGERY

## 2023-12-06 PROCEDURE — 94002 VENT MGMT INPAT INIT DAY: CPT

## 2023-12-06 PROCEDURE — 999N000141 HC STATISTIC PRE-PROCEDURE NURSING ASSESSMENT: Performed by: SURGERY

## 2023-12-06 PROCEDURE — 86923 COMPATIBILITY TEST ELECTRIC: CPT | Performed by: SURGERY

## 2023-12-06 PROCEDURE — 80053 COMPREHEN METABOLIC PANEL: CPT | Performed by: PHYSICIAN ASSISTANT

## 2023-12-06 PROCEDURE — 370N000017 HC ANESTHESIA TECHNICAL FEE, PER MIN: Performed by: SURGERY

## 2023-12-06 PROCEDURE — 272N000004 HC RX 272: Performed by: SURGERY

## 2023-12-06 PROCEDURE — 82803 BLOOD GASES ANY COMBINATION: CPT

## 2023-12-06 PROCEDURE — 250N000011 HC RX IP 250 OP 636: Mod: JZ

## 2023-12-06 PROCEDURE — 999N000065 XR CHEST PORT 1 VIEW

## 2023-12-06 PROCEDURE — C1898 LEAD, PMKR, OTHER THAN TRANS: HCPCS | Performed by: SURGERY

## 2023-12-06 PROCEDURE — 82805 BLOOD GASES W/O2 SATURATION: CPT | Performed by: INTERNAL MEDICINE

## 2023-12-06 PROCEDURE — 999N000157 HC STATISTIC RCP TIME EA 10 MIN

## 2023-12-06 PROCEDURE — 84100 ASSAY OF PHOSPHORUS: CPT | Performed by: PHYSICIAN ASSISTANT

## 2023-12-06 PROCEDURE — 250N000025 HC SEVOFLURANE, PER MIN: Performed by: SURGERY

## 2023-12-06 DEVICE — SU STERNAL WIRE SINGLE #6 046-032: Type: IMPLANTABLE DEVICE | Site: CHEST | Status: FUNCTIONAL

## 2023-12-06 DEVICE — SS SUTURE, 3 PER SLEEVE
Type: IMPLANTABLE DEVICE | Site: CHEST | Status: FUNCTIONAL
Brand: MYO/WIRE II

## 2023-12-06 RX ORDER — PHENYLEPHRINE HCL IN 0.9% NACL 50MG/250ML
.1-6 PLASTIC BAG, INJECTION (ML) INTRAVENOUS CONTINUOUS
Status: DISCONTINUED | OUTPATIENT
Start: 2023-12-06 | End: 2023-12-06 | Stop reason: HOSPADM

## 2023-12-06 RX ORDER — CEFAZOLIN SODIUM 1 G/3ML
1 INJECTION, POWDER, FOR SOLUTION INTRAMUSCULAR; INTRAVENOUS EVERY 8 HOURS
Qty: 15 ML | Refills: 0 | Status: COMPLETED | OUTPATIENT
Start: 2023-12-06 | End: 2023-12-07

## 2023-12-06 RX ORDER — PROTAMINE SULFATE 10 MG/ML
INJECTION, SOLUTION INTRAVENOUS PRN
Status: DISCONTINUED | OUTPATIENT
Start: 2023-12-06 | End: 2023-12-06

## 2023-12-06 RX ORDER — SODIUM CHLORIDE, SODIUM GLUCONATE, SODIUM ACETATE, POTASSIUM CHLORIDE AND MAGNESIUM CHLORIDE 526; 502; 368; 37; 30 MG/100ML; MG/100ML; MG/100ML; MG/100ML; MG/100ML
1000 INJECTION, SOLUTION INTRAVENOUS
Status: DISCONTINUED | OUTPATIENT
Start: 2023-12-06 | End: 2023-12-06

## 2023-12-06 RX ORDER — SODIUM CHLORIDE, SODIUM LACTATE, POTASSIUM CHLORIDE, CALCIUM CHLORIDE 600; 310; 30; 20 MG/100ML; MG/100ML; MG/100ML; MG/100ML
INJECTION, SOLUTION INTRAVENOUS CONTINUOUS
Status: DISCONTINUED | OUTPATIENT
Start: 2023-12-06 | End: 2023-12-06 | Stop reason: HOSPADM

## 2023-12-06 RX ORDER — METHADONE HYDROCHLORIDE 10 MG/ML
INJECTION, SOLUTION INTRAMUSCULAR; INTRAVENOUS; SUBCUTANEOUS
Status: DISCONTINUED
Start: 2023-12-06 | End: 2023-12-06 | Stop reason: HOSPADM

## 2023-12-06 RX ORDER — METHADONE HYDROCHLORIDE 10 MG/ML
0.3 INJECTION, SOLUTION INTRAMUSCULAR; INTRAVENOUS; SUBCUTANEOUS ONCE
Status: COMPLETED | OUTPATIENT
Start: 2023-12-06 | End: 2023-12-06

## 2023-12-06 RX ORDER — LIDOCAINE 40 MG/G
CREAM TOPICAL
Status: DISCONTINUED | OUTPATIENT
Start: 2023-12-06 | End: 2023-12-06 | Stop reason: HOSPADM

## 2023-12-06 RX ORDER — BISACODYL 10 MG
10 SUPPOSITORY, RECTAL RECTAL DAILY PRN
Status: DISCONTINUED | OUTPATIENT
Start: 2023-12-06 | End: 2023-12-11 | Stop reason: HOSPADM

## 2023-12-06 RX ORDER — CEFAZOLIN SODIUM/WATER 2 G/20 ML
2 SYRINGE (ML) INTRAVENOUS SEE ADMIN INSTRUCTIONS
Status: DISCONTINUED | OUTPATIENT
Start: 2023-12-06 | End: 2023-12-06 | Stop reason: HOSPADM

## 2023-12-06 RX ORDER — PANTOPRAZOLE SODIUM 40 MG/1
40 TABLET, DELAYED RELEASE ORAL DAILY
Status: DISCONTINUED | OUTPATIENT
Start: 2023-12-07 | End: 2023-12-11 | Stop reason: HOSPADM

## 2023-12-06 RX ORDER — EZETIMIBE 10 MG/1
10 TABLET ORAL EVERY EVENING
Status: DISCONTINUED | OUTPATIENT
Start: 2023-12-06 | End: 2023-12-11 | Stop reason: HOSPADM

## 2023-12-06 RX ORDER — SIMVASTATIN 40 MG
80 TABLET ORAL AT BEDTIME
Status: DISCONTINUED | OUTPATIENT
Start: 2023-12-06 | End: 2023-12-11 | Stop reason: HOSPADM

## 2023-12-06 RX ORDER — VANCOMYCIN HYDROCHLORIDE 1 G/20ML
INJECTION, POWDER, LYOPHILIZED, FOR SOLUTION INTRAVENOUS PRN
Status: DISCONTINUED | OUTPATIENT
Start: 2023-12-06 | End: 2023-12-06 | Stop reason: HOSPADM

## 2023-12-06 RX ORDER — ACETAMINOPHEN 325 MG/1
975 TABLET ORAL EVERY 8 HOURS
Qty: 27 TABLET | Refills: 0 | Status: COMPLETED | OUTPATIENT
Start: 2023-12-06 | End: 2023-12-09

## 2023-12-06 RX ORDER — DEXMEDETOMIDINE HYDROCHLORIDE 4 UG/ML
.2-1 INJECTION, SOLUTION INTRAVENOUS CONTINUOUS
Status: DISCONTINUED | OUTPATIENT
Start: 2023-12-06 | End: 2023-12-06 | Stop reason: HOSPADM

## 2023-12-06 RX ORDER — SODIUM CHLORIDE 9 MG/ML
INJECTION, SOLUTION INTRAVENOUS CONTINUOUS PRN
Status: DISCONTINUED | OUTPATIENT
Start: 2023-12-06 | End: 2023-12-06

## 2023-12-06 RX ORDER — KETAMINE HYDROCHLORIDE 10 MG/ML
INJECTION INTRAMUSCULAR; INTRAVENOUS PRN
Status: DISCONTINUED | OUTPATIENT
Start: 2023-12-06 | End: 2023-12-06

## 2023-12-06 RX ORDER — DEXMEDETOMIDINE HYDROCHLORIDE 4 UG/ML
.2-1.2 INJECTION, SOLUTION INTRAVENOUS CONTINUOUS
Status: DISCONTINUED | OUTPATIENT
Start: 2023-12-06 | End: 2023-12-06 | Stop reason: HOSPADM

## 2023-12-06 RX ORDER — LIDOCAINE 4 G/G
1-2 PATCH TOPICAL EVERY 24 HOURS
Status: DISCONTINUED | OUTPATIENT
Start: 2023-12-07 | End: 2023-12-11 | Stop reason: HOSPADM

## 2023-12-06 RX ORDER — DEXMEDETOMIDINE HYDROCHLORIDE 4 UG/ML
.2-.7 INJECTION, SOLUTION INTRAVENOUS CONTINUOUS
Status: DISCONTINUED | OUTPATIENT
Start: 2023-12-06 | End: 2023-12-07

## 2023-12-06 RX ORDER — HYDROMORPHONE HCL IN WATER/PF 6 MG/30 ML
0.4 PATIENT CONTROLLED ANALGESIA SYRINGE INTRAVENOUS
Status: DISCONTINUED | OUTPATIENT
Start: 2023-12-06 | End: 2023-12-07

## 2023-12-06 RX ORDER — MULTIVITAMIN,THERAPEUTIC
1 TABLET ORAL DAILY
Status: DISCONTINUED | OUTPATIENT
Start: 2023-12-07 | End: 2023-12-11 | Stop reason: HOSPADM

## 2023-12-06 RX ORDER — MAGNESIUM SULFATE 4 G/50ML
4 INJECTION INTRAVENOUS ONCE
Status: COMPLETED | OUTPATIENT
Start: 2023-12-06 | End: 2023-12-06

## 2023-12-06 RX ORDER — ASPIRIN 81 MG/1
81 TABLET, CHEWABLE ORAL
Status: COMPLETED | OUTPATIENT
Start: 2023-12-06 | End: 2023-12-06

## 2023-12-06 RX ORDER — OXYCODONE HYDROCHLORIDE 5 MG/1
10 TABLET ORAL EVERY 4 HOURS PRN
Status: DISCONTINUED | OUTPATIENT
Start: 2023-12-06 | End: 2023-12-11 | Stop reason: HOSPADM

## 2023-12-06 RX ORDER — NALOXONE HYDROCHLORIDE 0.4 MG/ML
0.4 INJECTION, SOLUTION INTRAMUSCULAR; INTRAVENOUS; SUBCUTANEOUS
Status: DISCONTINUED | OUTPATIENT
Start: 2023-12-06 | End: 2023-12-11 | Stop reason: HOSPADM

## 2023-12-06 RX ORDER — DEXTROSE MONOHYDRATE 25 G/50ML
25-50 INJECTION, SOLUTION INTRAVENOUS
Status: DISCONTINUED | OUTPATIENT
Start: 2023-12-06 | End: 2023-12-11 | Stop reason: HOSPADM

## 2023-12-06 RX ORDER — HEPARIN SODIUM 5000 [USP'U]/.5ML
5000 INJECTION, SOLUTION INTRAVENOUS; SUBCUTANEOUS EVERY 8 HOURS
Status: DISCONTINUED | OUTPATIENT
Start: 2023-12-07 | End: 2023-12-11 | Stop reason: HOSPADM

## 2023-12-06 RX ORDER — HYDROMORPHONE HCL IN WATER/PF 6 MG/30 ML
0.2 PATIENT CONTROLLED ANALGESIA SYRINGE INTRAVENOUS
Status: DISCONTINUED | OUTPATIENT
Start: 2023-12-06 | End: 2023-12-07

## 2023-12-06 RX ORDER — CALCIUM GLUCONATE 20 MG/ML
1 INJECTION, SOLUTION INTRAVENOUS
Status: DISCONTINUED | OUTPATIENT
Start: 2023-12-06 | End: 2023-12-11 | Stop reason: HOSPADM

## 2023-12-06 RX ORDER — EZETIMIBE 10 MG/1
10 TABLET ORAL EVERY EVENING
COMMUNITY
End: 2023-12-20

## 2023-12-06 RX ORDER — NALOXONE HYDROCHLORIDE 1 MG/ML
0.4 INJECTION INTRAMUSCULAR; INTRAVENOUS; SUBCUTANEOUS
Status: DISCONTINUED | OUTPATIENT
Start: 2023-12-06 | End: 2023-12-06

## 2023-12-06 RX ORDER — FENTANYL CITRATE 50 UG/ML
INJECTION, SOLUTION INTRAMUSCULAR; INTRAVENOUS PRN
Status: DISCONTINUED | OUTPATIENT
Start: 2023-12-06 | End: 2023-12-06

## 2023-12-06 RX ORDER — PROCHLORPERAZINE MALEATE 5 MG
5 TABLET ORAL EVERY 6 HOURS PRN
Status: DISCONTINUED | OUTPATIENT
Start: 2023-12-06 | End: 2023-12-11 | Stop reason: HOSPADM

## 2023-12-06 RX ORDER — POLYETHYLENE GLYCOL 3350 17 G/17G
17 POWDER, FOR SOLUTION ORAL DAILY
Status: DISCONTINUED | OUTPATIENT
Start: 2023-12-07 | End: 2023-12-11 | Stop reason: HOSPADM

## 2023-12-06 RX ORDER — ASPIRIN 81 MG/1
162 TABLET, CHEWABLE ORAL DAILY
Status: DISCONTINUED | OUTPATIENT
Start: 2023-12-07 | End: 2023-12-10

## 2023-12-06 RX ORDER — PROPOFOL 10 MG/ML
INJECTION, EMULSION INTRAVENOUS PRN
Status: DISCONTINUED | OUTPATIENT
Start: 2023-12-06 | End: 2023-12-06

## 2023-12-06 RX ORDER — ONDANSETRON 4 MG/1
4 TABLET, ORALLY DISINTEGRATING ORAL EVERY 6 HOURS PRN
Status: DISCONTINUED | OUTPATIENT
Start: 2023-12-06 | End: 2023-12-11 | Stop reason: HOSPADM

## 2023-12-06 RX ORDER — NALOXONE HYDROCHLORIDE 1 MG/ML
0.2 INJECTION INTRAMUSCULAR; INTRAVENOUS; SUBCUTANEOUS
Status: DISCONTINUED | OUTPATIENT
Start: 2023-12-06 | End: 2023-12-06

## 2023-12-06 RX ORDER — NALOXONE HYDROCHLORIDE 0.4 MG/ML
0.2 INJECTION, SOLUTION INTRAMUSCULAR; INTRAVENOUS; SUBCUTANEOUS
Status: DISCONTINUED | OUTPATIENT
Start: 2023-12-06 | End: 2023-12-11 | Stop reason: HOSPADM

## 2023-12-06 RX ORDER — CEFAZOLIN SODIUM/WATER 2 G/20 ML
2 SYRINGE (ML) INTRAVENOUS
Status: COMPLETED | OUTPATIENT
Start: 2023-12-06 | End: 2023-12-06

## 2023-12-06 RX ORDER — LIDOCAINE HYDROCHLORIDE 10 MG/ML
INJECTION, SOLUTION INFILTRATION; PERINEURAL
Status: COMPLETED | OUTPATIENT
Start: 2023-12-06 | End: 2023-12-06

## 2023-12-06 RX ORDER — HYDRALAZINE HYDROCHLORIDE 20 MG/ML
10 INJECTION INTRAMUSCULAR; INTRAVENOUS EVERY 30 MIN PRN
Status: DISCONTINUED | OUTPATIENT
Start: 2023-12-06 | End: 2023-12-11 | Stop reason: HOSPADM

## 2023-12-06 RX ORDER — ONDANSETRON 2 MG/ML
4 INJECTION INTRAMUSCULAR; INTRAVENOUS EVERY 6 HOURS PRN
Status: DISCONTINUED | OUTPATIENT
Start: 2023-12-06 | End: 2023-12-11 | Stop reason: HOSPADM

## 2023-12-06 RX ORDER — ONDANSETRON 2 MG/ML
INJECTION INTRAMUSCULAR; INTRAVENOUS PRN
Status: DISCONTINUED | OUTPATIENT
Start: 2023-12-06 | End: 2023-12-06

## 2023-12-06 RX ORDER — HEPARIN SODIUM 1000 [USP'U]/ML
INJECTION, SOLUTION INTRAVENOUS; SUBCUTANEOUS PRN
Status: DISCONTINUED | OUTPATIENT
Start: 2023-12-06 | End: 2023-12-06

## 2023-12-06 RX ORDER — ACETAMINOPHEN 325 MG/1
650 TABLET ORAL EVERY 4 HOURS PRN
Status: DISCONTINUED | OUTPATIENT
Start: 2023-12-09 | End: 2023-12-11 | Stop reason: HOSPADM

## 2023-12-06 RX ORDER — NICOTINE POLACRILEX 4 MG
15-30 LOZENGE BUCCAL
Status: DISCONTINUED | OUTPATIENT
Start: 2023-12-06 | End: 2023-12-11 | Stop reason: HOSPADM

## 2023-12-06 RX ORDER — PHENYLEPHRINE HCL IN 0.9% NACL 50MG/250ML
.1-4 PLASTIC BAG, INJECTION (ML) INTRAVENOUS CONTINUOUS PRN
Status: DISCONTINUED | OUTPATIENT
Start: 2023-12-06 | End: 2023-12-07

## 2023-12-06 RX ORDER — OXYCODONE HYDROCHLORIDE 5 MG/1
5 TABLET ORAL EVERY 4 HOURS PRN
Status: DISCONTINUED | OUTPATIENT
Start: 2023-12-06 | End: 2023-12-11 | Stop reason: HOSPADM

## 2023-12-06 RX ORDER — ASPIRIN 81 MG/1
162 TABLET, CHEWABLE ORAL
Status: COMPLETED | OUTPATIENT
Start: 2023-12-06 | End: 2023-12-06

## 2023-12-06 RX ORDER — CHLORHEXIDINE GLUCONATE ORAL RINSE 1.2 MG/ML
10 SOLUTION DENTAL ONCE
Status: COMPLETED | OUTPATIENT
Start: 2023-12-06 | End: 2023-12-06

## 2023-12-06 RX ORDER — ASPIRIN 300 MG/1
300 SUPPOSITORY RECTAL DAILY
Status: DISCONTINUED | OUTPATIENT
Start: 2023-12-07 | End: 2023-12-07

## 2023-12-06 RX ORDER — LIDOCAINE HYDROCHLORIDE 10 MG/ML
INJECTION, SOLUTION INFILTRATION; PERINEURAL PRN
Status: DISCONTINUED | OUTPATIENT
Start: 2023-12-06 | End: 2023-12-06

## 2023-12-06 RX ORDER — CALCIUM GLUCONATE 20 MG/ML
2 INJECTION, SOLUTION INTRAVENOUS
Status: DISCONTINUED | OUTPATIENT
Start: 2023-12-06 | End: 2023-12-11 | Stop reason: HOSPADM

## 2023-12-06 RX ORDER — PAPAVERINE HYDROCHLORIDE 30 MG/ML
INJECTION INTRAMUSCULAR; INTRAVENOUS PRN
Status: DISCONTINUED | OUTPATIENT
Start: 2023-12-06 | End: 2023-12-06 | Stop reason: HOSPADM

## 2023-12-06 RX ORDER — AMOXICILLIN 250 MG
1 CAPSULE ORAL 2 TIMES DAILY
Status: DISCONTINUED | OUTPATIENT
Start: 2023-12-06 | End: 2023-12-11 | Stop reason: HOSPADM

## 2023-12-06 RX ADMIN — SODIUM CHLORIDE: 9 INJECTION, SOLUTION INTRAVENOUS at 12:02

## 2023-12-06 RX ADMIN — CALCIUM GLUCONATE 1 G: 20 INJECTION, SOLUTION INTRAVENOUS at 13:28

## 2023-12-06 RX ADMIN — MAGNESIUM SULFATE HEPTAHYDRATE 4 G: 80 INJECTION, SOLUTION INTRAVENOUS at 06:31

## 2023-12-06 RX ADMIN — KETAMINE HYDROCHLORIDE 10 MG: 10 INJECTION INTRAMUSCULAR; INTRAVENOUS at 07:23

## 2023-12-06 RX ADMIN — Medication 5 MG: at 07:39

## 2023-12-06 RX ADMIN — SODIUM CHLORIDE: 9 INJECTION, SOLUTION INTRAVENOUS at 07:59

## 2023-12-06 RX ADMIN — Medication 2 G: at 11:55

## 2023-12-06 RX ADMIN — CHLORHEXIDINE GLUCONATE 10 ML: 1.2 SOLUTION ORAL at 06:23

## 2023-12-06 RX ADMIN — DESMOPRESSIN ACETATE 24.2 MCG: 4 INJECTION, SOLUTION INTRAVENOUS; SUBCUTANEOUS at 11:21

## 2023-12-06 RX ADMIN — METOPROLOL TARTRATE 12.5 MG: 25 TABLET, FILM COATED ORAL at 06:19

## 2023-12-06 RX ADMIN — PHENYLEPHRINE HYDROCHLORIDE 50 MCG: 10 INJECTION INTRAVENOUS at 09:06

## 2023-12-06 RX ADMIN — INSULIN HUMAN 3 UNITS/HR: 1 INJECTION, SOLUTION INTRAVENOUS at 09:49

## 2023-12-06 RX ADMIN — KETAMINE HYDROCHLORIDE 40 MG: 10 INJECTION INTRAMUSCULAR; INTRAVENOUS at 07:37

## 2023-12-06 RX ADMIN — ACETAMINOPHEN 975 MG: 325 TABLET ORAL at 21:20

## 2023-12-06 RX ADMIN — Medication 2 G: at 07:55

## 2023-12-06 RX ADMIN — PHENYLEPHRINE HYDROCHLORIDE 100 MCG: 10 INJECTION INTRAVENOUS at 07:54

## 2023-12-06 RX ADMIN — HEPARIN SODIUM 30000 UNITS: 1000 INJECTION INTRAVENOUS; SUBCUTANEOUS at 09:07

## 2023-12-06 RX ADMIN — PHENYLEPHRINE HYDROCHLORIDE 50 MCG: 10 INJECTION INTRAVENOUS at 09:23

## 2023-12-06 RX ADMIN — CEFAZOLIN 1 G: 1 INJECTION, POWDER, FOR SOLUTION INTRAMUSCULAR; INTRAVENOUS at 20:22

## 2023-12-06 RX ADMIN — Medication 5 MG: at 07:38

## 2023-12-06 RX ADMIN — PHENYLEPHRINE HYDROCHLORIDE 50 MCG: 10 INJECTION INTRAVENOUS at 12:13

## 2023-12-06 RX ADMIN — FENTANYL CITRATE 50 MCG: 50 INJECTION INTRAMUSCULAR; INTRAVENOUS at 07:23

## 2023-12-06 RX ADMIN — SODIUM CHLORIDE 7.5 G: 900 INJECTION, SOLUTION INTRAVENOUS at 08:05

## 2023-12-06 RX ADMIN — SODIUM CHLORIDE, POTASSIUM CHLORIDE, SODIUM LACTATE AND CALCIUM CHLORIDE: 600; 310; 30; 20 INJECTION, SOLUTION INTRAVENOUS at 06:31

## 2023-12-06 RX ADMIN — LIDOCAINE HYDROCHLORIDE 5 ML: 10 INJECTION, SOLUTION INFILTRATION; PERINEURAL at 07:36

## 2023-12-06 RX ADMIN — SIMVASTATIN 80 MG: 40 TABLET, FILM COATED ORAL at 21:20

## 2023-12-06 RX ADMIN — Medication 0.2 MCG/KG/MIN: at 07:59

## 2023-12-06 RX ADMIN — HYDROMORPHONE HYDROCHLORIDE 0.2 MG: 0.2 INJECTION, SOLUTION INTRAMUSCULAR; INTRAVENOUS; SUBCUTANEOUS at 14:22

## 2023-12-06 RX ADMIN — EZETIMIBE 10 MG: 10 TABLET ORAL at 21:20

## 2023-12-06 RX ADMIN — LIDOCAINE HYDROCHLORIDE 2 ML: 10 INJECTION, SOLUTION INFILTRATION; PERINEURAL at 07:25

## 2023-12-06 RX ADMIN — AMINOCAPROIC ACID 1.25 G/HR: 250 INJECTION, SOLUTION INTRAVENOUS at 09:15

## 2023-12-06 RX ADMIN — HYDROMORPHONE HYDROCHLORIDE 0.2 MG: 0.2 INJECTION, SOLUTION INTRAMUSCULAR; INTRAVENOUS; SUBCUTANEOUS at 20:25

## 2023-12-06 RX ADMIN — PHENYLEPHRINE HYDROCHLORIDE 100 MCG: 10 INJECTION INTRAVENOUS at 07:38

## 2023-12-06 RX ADMIN — PHENYLEPHRINE HYDROCHLORIDE 100 MCG: 10 INJECTION INTRAVENOUS at 07:40

## 2023-12-06 RX ADMIN — FENTANYL CITRATE 50 MCG: 50 INJECTION INTRAMUSCULAR; INTRAVENOUS at 08:29

## 2023-12-06 RX ADMIN — SODIUM CHLORIDE, POTASSIUM CHLORIDE, SODIUM LACTATE AND CALCIUM CHLORIDE 250 ML: 600; 310; 30; 20 INJECTION, SOLUTION INTRAVENOUS at 14:15

## 2023-12-06 RX ADMIN — SODIUM CHLORIDE, POTASSIUM CHLORIDE, SODIUM LACTATE AND CALCIUM CHLORIDE: 600; 310; 30; 20 INJECTION, SOLUTION INTRAVENOUS at 11:30

## 2023-12-06 RX ADMIN — ROCURONIUM BROMIDE 70 MG: 50 INJECTION, SOLUTION INTRAVENOUS at 07:38

## 2023-12-06 RX ADMIN — ROCURONIUM BROMIDE 30 MG: 50 INJECTION, SOLUTION INTRAVENOUS at 08:24

## 2023-12-06 RX ADMIN — ASPIRIN 81 MG CHEWABLE TABLET 162 MG: 81 TABLET CHEWABLE at 06:20

## 2023-12-06 RX ADMIN — CALCIUM GLUCONATE 1 G: 20 INJECTION, SOLUTION INTRAVENOUS at 21:20

## 2023-12-06 RX ADMIN — PHENYLEPHRINE HYDROCHLORIDE 50 MCG: 10 INJECTION INTRAVENOUS at 07:46

## 2023-12-06 RX ADMIN — PHENYLEPHRINE HYDROCHLORIDE 100 MCG: 10 INJECTION INTRAVENOUS at 07:49

## 2023-12-06 RX ADMIN — HYDROMORPHONE HYDROCHLORIDE 0.4 MG: 0.2 INJECTION, SOLUTION INTRAMUSCULAR; INTRAVENOUS; SUBCUTANEOUS at 12:44

## 2023-12-06 RX ADMIN — EPINEPHRINE 0.02 MCG/KG/MIN: 1 INJECTION INTRAMUSCULAR; INTRAVENOUS; SUBCUTANEOUS at 11:02

## 2023-12-06 RX ADMIN — ROCURONIUM BROMIDE 30 MG: 50 INJECTION, SOLUTION INTRAVENOUS at 09:37

## 2023-12-06 RX ADMIN — SENNOSIDES AND DOCUSATE SODIUM 1 TABLET: 8.6; 5 TABLET ORAL at 21:20

## 2023-12-06 RX ADMIN — SUGAMMADEX 200 MG: 100 INJECTION, SOLUTION INTRAVENOUS at 12:22

## 2023-12-06 RX ADMIN — DEXMEDETOMIDINE HYDROCHLORIDE 0.5 MCG/KG/HR: 400 INJECTION INTRAVENOUS at 08:05

## 2023-12-06 RX ADMIN — PROPOFOL 50 MG: 10 INJECTION, EMULSION INTRAVENOUS at 07:37

## 2023-12-06 RX ADMIN — ONDANSETRON 4 MG: 2 INJECTION INTRAMUSCULAR; INTRAVENOUS at 12:22

## 2023-12-06 RX ADMIN — PHENYLEPHRINE HYDROCHLORIDE 50 MCG: 10 INJECTION INTRAVENOUS at 11:29

## 2023-12-06 RX ADMIN — Medication 5 MG: at 07:36

## 2023-12-06 RX ADMIN — INSULIN HUMAN 2 UNITS: 100 INJECTION, SOLUTION PARENTERAL at 09:49

## 2023-12-06 RX ADMIN — SODIUM CHLORIDE, POTASSIUM CHLORIDE, SODIUM LACTATE AND CALCIUM CHLORIDE 250 ML: 600; 310; 30; 20 INJECTION, SOLUTION INTRAVENOUS at 23:35

## 2023-12-06 RX ADMIN — PROTAMINE SULFATE 200 MG: 10 INJECTION, SOLUTION INTRAVENOUS at 11:10

## 2023-12-06 ASSESSMENT — ACTIVITIES OF DAILY LIVING (ADL)
ADLS_ACUITY_SCORE: 23
ADLS_ACUITY_SCORE: 21
ADLS_ACUITY_SCORE: 21
ADLS_ACUITY_SCORE: 23
ADLS_ACUITY_SCORE: 21
ADLS_ACUITY_SCORE: 23
ADLS_ACUITY_SCORE: 23
ADLS_ACUITY_SCORE: 21
ADLS_ACUITY_SCORE: 21

## 2023-12-06 NOTE — ANESTHESIA CARE TRANSFER NOTE
Patient: Royce Felzi    Procedure: Procedure(s):  CORONARY ARTERY BYPASS GRAFT X 4 , LEFT INTERNAL MAMMARY ARTERY HARVEST, LEFT SAPHENOUS ENDOSCOPIC VESSEL PROCUREMENT, DARIELA ,   EPI AORTIC ULTRASOUND  ANESTHESIA TRANSESOPHAGEAL ECHOCARDIOGRAM       Diagnosis: CAD (coronary artery disease) [I25.10]  Diagnosis Additional Information: No value filed.    Anesthesia Type:   General     Note:    Oropharynx: endotracheal tube in place, ventilatory support and oral gastic tube in place  Level of Consciousness: iatrogenic sedation    Level of Supplemental Oxygen (L/min / FiO2): 100%  Independent Airway: airway patency not satisfactory and stable  Dentition: dentition unchanged  Vital Signs Stable: post-procedure vital signs reviewed and stable  Report to RN Given: handoff report given  Patient transferred to: ICU  Comments: Patient transported to ICU with CRNA and Anesthesiologist. Patient hemodynamically stable with vasopressor and ventilatory support. Report given to ICU RN and MD, all questions answered. CRNA ensured ICU staff was comfortable taking over care of the patient.   ICU Handoff: Call for PAUSE to initiate/utilize ICU HANDOFF, Identified Patient, Identified Responsible Provider, Reviewed the Pertinent Medical History, Discussed Surgical Course, Reviewed Intra-OP Anesthesia Management and Issues during Anesthesia, Set Expectations for Post Procedure Period and Allowed Opportunity for Questions and Acknowledgement of Understanding      Vitals:  Vitals Value Taken Time   /70    Temp 97.2    Pulse 77 12/06/23 1226   Resp 15    SpO2 100 % 12/06/23 1226   Vitals shown include unfiled device data.    Electronically Signed By: JORGE LUIS Carrillo CRNA  December 6, 2023  12:28 PM

## 2023-12-06 NOTE — PROGRESS NOTES
PT arrived to the ICU from OR, and placed on Drager ventilator with initial settings as follows; AC/VC, 18 RR, 500 VT, 60% Fi02, +5 PEEP. PT appears sedated in sync with the Ventilator. RT will continue to follow.    Cruz Saldaña, RT

## 2023-12-06 NOTE — ANESTHESIA PROCEDURE NOTES
Perioperative DARIELA Procedure Note    Staff -        Anesthesiologist:  Armida Frank MD       Performed By: anesthesiologist  Preanesthesia Checklist:  Patient identified, IV assessed, risks and benefits discussed, monitors and equipment assessed, procedure being performed at surgeon's request and anesthesia consent obtained.    DARIELA Probe Insertion    Probe Status PRE Insertion: NO obvious damage  Probe type:  Adult 3D  Bite block used:   Soft  Insertion Technique: Easy, no oropharyngeal manipulation  Insertion complications: None obvious  Billing Report:A DARIELA report is NOT being generated.  Probe Status POST Removal: NO obvious damage

## 2023-12-06 NOTE — PHARMACY-ADMISSION MEDICATION HISTORY
Pharmacist Admission Medication History    Admission medication history is complete. The information provided in this note is only as accurate as the sources available at the time of the update.    Information Source(s): Patient via in-person    Pertinent Information: Patient has been holding vitamins and supplements for 4-5 days prior to surgery as instructed.    Changes made to PTA medication list:  Added: None  Deleted: None  Changed: None    Medication Affordability:  Not including over the counter (OTC) medications, was there a time in the past 3 months when you did not take your medications as prescribed because of cost?: No    Allergies reviewed with patient and updates made in EHR: yes    Medication History Completed By: TODD PRIETO RPH 12/6/2023 6:35 AM    PTA Med List   Medication Sig Last Dose    aspirin 81 MG EC tablet [ASPIRIN 81 MG EC TABLET] Take 1 tablet by mouth. 12/6/2023 at am    coenzyme Q10 200 mg capsule [COENZYME Q10 200 MG CAPSULE] Take 200 mg by mouth daily. Past Week at am    ezetimibe (ZETIA) 10 MG tablet Take 10 mg by mouth every evening 12/5/2023 at pm    Multiple Vitamin (MULTIVITAMIN ADULT PO) Take 1 tablet by mouth daily Past Week at am    OMEGA-3/DHA/EPA/FISH OIL (FISH OIL-OMEGA-3 FATTY ACIDS) 300-1,000 mg capsule Take 2 g by mouth daily Past Week at am    omeprazole (PRILOSEC) 20 MG DR capsule TAKE ONE CAPSULE BY MOUTH DAILY 12/5/2023 at am    sildenafil (VIAGRA) 50 MG tablet [SILDENAFIL (VIAGRA) 50 MG TABLET] TAKE 1 TABLET BY MOUTH 1 HOUR BEFORE NEEDED Unknown at prn    simvastatin (ZOCOR) 80 MG tablet TAKE 1 TABLET BY MOUTH DAILY 12/5/2023 at PM

## 2023-12-06 NOTE — ANESTHESIA PROCEDURE NOTES
Airway       Patient location during procedure: OR       Procedure Start/Stop Times: 12/6/2023 7:41 AM  Staff -        Anesthesiologist:  Armida Frank MD       CRNA: Santos Guzman APRN CRNA       Other Anesthesia Staff: Mercedez Paiz RN       Performed By: SRNA  Consent for Airway        Urgency: elective  Indications and Patient Condition       Indications for airway management: seferino-procedural       Induction type:intravenous       Mask difficulty assessment: 2 - vent by mask + OA or adjuvant +/- NMBA    Final Airway Details       Final airway type: endotracheal airway       Successful airway: ETT - single  Endotracheal Airway Details        ETT size (mm): 7.5       Cuffed: yes       Cuff volume (mL): 10       Successful intubation technique: video laryngoscopy       VL Blade Size: Glidescope 3       Grade View of Cords: 1       Adjucts: stylet       Position: Center       Measured from: lips       Secured at (cm): 22       Bite block used: None    Post intubation assessment        Placement verified by: capnometry, equal breath sounds and chest rise        Number of attempts at approach: 1       Number of other approaches attempted: 0       Secured with: tape       Ease of procedure: easy       Dentition: Intact and Unchanged    Medication(s) Administered   Medication Administration Time: 12/6/2023 7:41 AM

## 2023-12-06 NOTE — ANESTHESIA PROCEDURE NOTES
Central Line/PA Catheter Placement    Pre-Procedure   Staff -        Anesthesiologist:  Armida Frank MD       Performed By: anesthesiologist       Location: OR       Pre-Anesthestic Checklist: patient identified, IV checked, site marked, risks and benefits discussed, informed consent, monitors and equipment checked, pre-op evaluation and at physician/surgeon's request  Timeout:       Correct Patient: Yes        Correct Procedure: Yes        Correct Site: Yes        Correct Position: Yes        Correct Laterality: Yes   Line Placement:   This line was placed Post Induction starting at 12/6/2023 7:43 AM and ending at 12/6/2023 7:54 AM    Procedure   Procedure: central line       Laterality: right       Insertion Site: internal jugular.       Patient Position: Trendelenburg  Sterile Prep        All elements of maximal sterile barrier technique followed       Patient Prep/Sterile Barriers: draped, hand hygiene, gloves , hat , mask , draped, gown, sterile gel and probe cover       Skin prep: Chloraprep  Insertion/Injection        Technique: ultrasound guided and Seldinger Technique        1. Ultrasound was used to evaluate the access site.       2. Vein evaluated via ultrasound for patency/adequacy.       3. Using real-time ultrasound the needle/catheter was observed entering the artery/vein.       4. Permanent image was captured and entered into the patient's record.       5. The visualized structures were anatomically normal.       6. There were no apparent abnormal pathologic findings.       Introducer Type: 9 Fr, 2-lumen MAC        Type: Introducer  Narrative         Secured by: suture       Tegaderm and Biopatch dressing used.       Complications: None apparent,        blood aspirated from all lumens,        All lumens flushed: Yes       Verification method: DARIELA and Placement to be verified post-op

## 2023-12-06 NOTE — PROGRESS NOTES
CT EXTUBATION FAST TRACK:    [facility] - ICU     FastTrack Candidate: Yes, Extubation Goal Time ( 6 Hours ) 1605     Patient Status: Extubated

## 2023-12-06 NOTE — OP NOTE
DATE OF SERVICE: 12/6/2023.     PREOPERATIVE DIAGNOSES:  Severe multivessel coronary artery disease.       POSTOPERATIVE DIAGNOSES:  Severe multivessel coronary artery disease.     PROCEDURE PERFORMED:  1.  Coronary artery bypass grafting x 4 (reversed saphenous vein graft to the right posterior descending coronary artery, reversed saphenous vein graft to the right posterolateral branch of the right coronary artery, reversed saphenous vein graft to the diagonal branch of the left anterior descending coronary artery, and pedicled left internal mammary artery to left anterior descending coronary artery).  2.  Endoscopic vein harvest of the greater saphenous vein from the left lower extremity.     SURGEON:  Rene Schwab MD, PhD.     FIRST ASSISTANT:  Lalitha Mercado, surgical first assistant/surgical technician.    SECOND ASSISTANT: Ailyn Chance PA-C.     ANESTHESIA:  General endotracheal anesthesia.     ANESTHESIOLOGIST:  Armida Frank MD.     ESTIMATED BLOOD LOSS:  500 mL.     SPECIMEN:  None.     INDICATIONS FOR PROCEDURE: Mr. Feliz is a 72-year-old man with severe multivessel coronary artery disease. I recommend coronary artery bypass grafting. I described the technical details, as well as the expected postoperative course and recovery to the patient. I also discussed the risks and benefits of the procedure. The risks include but are not limited to bleeding, infection, stroke, heart or graft failure with myocardial infarction, dysrhythmia, respiratory failure, kidney or liver injury, bowel or limb ischemia, and death. The calculated STS risk for Operative Mortality is 0.79% and the risk of Morbidity & Mortality is 4.4%. The patient understands these risks and wishes to undergo the operation.      OPERATIVE FINDINGS:  The left internal mammary artery was 1.75 mm in diameter and had excellent flow.  The greater saphenous vein from the left lower extremity was 3-4 mm in diameter and suitable for conduit.   The ascending aorta was free of calcified plaque.  The right posterior descending coronary artery was 1.75 mm in diameter and free of disease at the site of anastomosis. The right posterolateral branch of the right coronary artery was 1.75 mm in diameter and free of disease at the site of anastomosis. The diagonal branch of the left anterior descending coronary artery was 1.75 mm in diameter and free of disease at the site anastomosis.  The left anterior descending coronary artery 1.75 mm in diameter and free of disease at the site of anastomosis.  After reperfusion, sinus rhythm resumed.  Left ventricular function was normal preoperatively and unchanged after bypass on low-dose inotropic support.     OPERATIVE DESCRIPTION IN DETAIL:  After obtaining informed consent, the patient was brought to the operating room and placed in the supine position on the operating room table.  Appropriate lines and devices for monitoring were placed by the anesthesia team.  The patient underwent smooth induction of general anesthesia, and the trachea was intubated orally.  A right internal jugular Cordis introducer was placed, and a [place]-Cesar catheter was placed through this.  The patient was prepped and draped, and a timeout was performed to confirm the correct patient identity, as well as the procedure to be performed.  A median sternotomy was performed and the left internal mammary artery was harvested.  The greater saphenous vein was harvested from the left lower extremity using endoscopic vein harvesting by the physician assistant, Ailyn Chance PA-C.     The patient was given full dose heparin to achieve and activated clotting time over 480 seconds, and after cannulation of the distal ascending aorta and the right atrium, cardiopulmonary bypass was commenced.  Antegrade and retrograde cardioplegia cannulae were placed, and the heart was arrested with 1 liter of cold antegrade blood cardioplegia.  Subsequent maintenance doses  of 300 mL of cold retrograde blood cardioplegia were given approximately every 20 minutes or after each distal anastomosis.  Topical cold saline slush was applied for additional protection.     The following grafts were constructed in end-to-side fashion using running 7-0 Prolene:  A reversed saphenous vein graft was sewn to the proximal right posterior descending coronary artery, a reversed saphenous vein graft was sewn to the proximal right posterolateral branch of the right coronary artery, and a reversed saphenous vein graft was sewn to the mid diagonal branch of the left anterior descending coronary artery. The pedicled left internal mammary artery was sewn to the mid left anterior descending coronary artery in end-to-side fashion using running 8-0 Prolene.  The proximal anastomoses of the 3 vein grafts were constructed on the ascending aorta in end-to-side fashion using running 6-0 Prolene under a single crossclamp.  The crossclamp was released after a retrograde dose of terminal warm blood cardioplegia was delivered, and the heart was resuscitated.       All bleeding points were controlled.  A bipolar ventricular pacing lead was placed and brought out through a separate stab incision. A bipolar right atrial pacing lead was placed and brought out through a separate stab incision.  The patient weaned from cardiopulmonary bypass, was given protamine and decannulated.  A 24-Colombian Vitaliy drain was placed in the left pleural space and brought out through a separate stab incision.  A 24-Colombian Vitaliy drain was placed in the right pleural space and brought out through a separate stab incision. A 28-Colombian chest tube was placed in the anterior mediastinum and brought out through a separate stab incision.  The sternal edges were reapposed with 3 interrupted #6 stainless steel sternal wires in the manubrium, 3 double wires in the body of the sternum and 1 additional #6 stainless steel sternal wire at the lower aspect of  the sternum.  The muscle, fascia, and skin were closed in layers with absorbable suture.  Dermabond was applied.  All sponge, needle and instrument counts were correct at the end of the case.        ZAMZAM WHITE MD

## 2023-12-06 NOTE — PROGRESS NOTES
Post op CAB    1342: FiO2 decreased to 40%. Pt awake and following commands. Trial SBT. Low tidal volumes.     1505: Pt more awake. Gagging on tube. Cuff leak present. SBT trial +5/5 started.    1605: Extubated to 3L NC. No complications.    Michelle Andrade, RT

## 2023-12-06 NOTE — H&P
H&P Update    The patient has no new complaints and there are no new exam findings. The H&P is up to date. Please see the note below for details.    Rene Schwab MD  7:14 AM  12/6/2023    Cardiac Surgery Consultation      Royce Feliz MRN# [id number]   YOB: 1950 Age: 72 year old         Reason for consult: I was asked by Dr. Zhou to evaluate this patient for severe multivessel coronary artery disease.           Assessment and Plan:   Mr. Feliz is a 72-year-old man with severe multivessel coronary artery disease. I recommend coronary artery bypass grafting. I described the technical details, as well as the expected postoperative course and recovery to the patient. I also discussed the risks and benefits of the procedure. The risks include but are not limited to bleeding, infection, stroke, heart or graft failure with myocardial infarction, dysrhythmia, respiratory failure, kidney or liver injury, bowel or limb ischemia, and death. The calculated STS risk:     Procedure Type: Isolated CABG  Perioperative Outcome        Estimate %  Operative Mortality         0.79%  Morbidity & Mortality      4.4%  Stroke  0.734%  Renal Failure            0.677%  Reoperation            1.6%  Prolonged Ventilation      2.16%  Deep Sternal Wound Infection        0.128%  Long Hospital Stay (>14 days)       2.24%  Short Hospital Stay (<6 days)         57.7%     The patient understands these risks and wishes to undergo the operation.         Surgery will be scheduled in the coming weeks.     The preoperative evaluation is complete.     History is obtained from the patient.          History of Present Illness:   This patient is a 72 year old male who presents to discuss the option of coronary artery bypass grafting for treatment of his severe multivessel coronary artery disease. He has a strong family history of premature coronary artery disease, and sadly his brother passed away from a massive MI.  The patient has coronary artery disease risk factors including hypertension, dyslipidemia, and type 2 diabetes mellitus. He underwent a coronary CTA with FFR that demonstrates severe multivessel coronary artery disease with flow limiting stenoses. He has no chest pain or pressure. He denies palpitations. He is quite active and otherwise healthy. He denies heart failure symptoms.                 Past Medical History:   Lower extremity fracture treated with closed reduction and fixation  Eye surgery to remove foreign body  Hypertension  Dyslipidemia  Type 2 diabetes mellitus          Past Surgical History:      Past Surgical History         Past Surgical History:   Procedure Laterality Date    BIOPSY SKIN (LOCATION)        CV CORONARY ANGIOGRAM N/A 11/22/2023     Procedure: Coronary Angiogram;  Surgeon: Sanjay Arredondo MD;  Location: [facility] CV    CV PCI N/A 11/22/2023     Procedure: Percutaneous Coronary Intervention;  Surgeon: Sanjay Arredondo MD;  Location: [facility] CV    EYE SURGERY                       Social History:      Social History           Tobacco Use    Smoking status: Never    Smokeless tobacco: Never   Substance Use Topics    Alcohol use: Not on file              Family History:      Family History         Family History   Problem Relation Age of Onset    Cancer Mother      Diabetes Father      Cancer Sister      Varicose Veins Sister      Diabetes Brother                   Immunizations:           Immunization History   Administered Date(s) Administered    COVID-19 Monovalent Booster 18+ (Moderna) 11/17/2021    COVID-19 Vaccine (Love) 03/15/2021    HepA, Unspecified 06/28/2007    HepB, Unspecified 06/28/2007    Hepatitis A (ADULT 19+) 06/28/2007    Hepatitis B, Adult 06/28/2007    Influenza (High Dose) 3 valent vaccine 11/25/2015, 11/16/2017    Pneumo Conj 13-V (2010&after) 11/25/2015    Pneumococcal 23 valent 11/16/2017    TDAP (Adacel,Boostrix) 09/25/2019    TDAP Vaccine  (Boostrix) 09/25/2019    Typhoid IM 07/11/2007    Typhoid, Unspecified Formulation 07/11/2007    Zoster recombinant adjuvanted (SHINGRIX) 08/01/2018, 10/03/2019              Allergies:   No Known Allergies          Medications:          Current Outpatient Medications Ordered in Epic   Medication    aspirin 81 MG EC tablet    coenzyme Q10 200 mg capsule    ezetimibe (ZETIA) 10 MG tablet    Multiple Vitamin (MULTIVITAMIN ADULT PO)    OMEGA-3/DHA/EPA/FISH OIL (FISH OIL-OMEGA-3 FATTY ACIDS) 300-1,000 mg capsule    omeprazole (PRILOSEC) 20 MG DR capsule    sildenafil (VIAGRA) 50 MG tablet    simvastatin (ZOCOR) 80 MG tablet      No current Epic-ordered facility-administered medications on file.              Review of Systems:      The 10 point Review of Systems is negative other than noted in the HPI            Physical Exam:   Vitals were reviewed      BP: 122/74 Pulse: 72   Resp: 16        Constitutional:    awake, alert, cooperative, no apparent distress, and appears stated age      Eyes:    Lids and lashes normal, pupils equal, round and reactive to light, extra ocular muscles intact, sclera clear, conjunctiva normal      ENT:    normocepalic, without obvious abnormality      Lungs:    no increased work of breathing, good air exchange, and no retractions      Cardiovascular:    regular rate and rhythm      Abdomen:    non-distended      Musculoskeletal:    no lower extremity pitting edema present  there is no redness, warmth, or swelling of the joints  full range of motion noted  motor strength is 5 out of 5 all extremities bilaterally      Neurologic:    Mental Status Exam:  Level of Alertness:   awake  Orientation:   person, place, time  Cranial Nerves:  cranial nerves II-XII are grossly intact  Motor Exam:  moves all extremities well and symmetrically      Neuropsychiatric:    General: normal, calm, and normal eye contact  Level of consciousness: alert / normal  Affect: normal      Skin:    no bruising or  bleeding, normal skin color, texture, turgor, and no redness, warmth, or swelling           Data:   All laboratory data reviewed  All cardiac studies reviewed by me.  All imaging studies reviewed by me.     CARDIAC CATHETERIZATION:    Mid LM to Dist LM lesion is 40% stenosed.    Prox LAD lesion is 70% stenosed.    Prox LAD to Mid LAD lesion is 60% stenosed.    Mid LAD lesion is 75% stenosed.    1st Diag lesion is 70% stenosed.    Prox RCA lesion is 40% stenosed.    Dist RCA lesion is 90% stenosed.    RPDA lesion is 80% stenosed.    Mid RCA lesion is 30% stenosed.     1.  Severe calcific multivessel coronary disease  2.  Sequential calcified stenoses proximal to mid LAD, involving takeoff of large first diagonal branch.  3.  Diffuse severe right coronary calcification with moderate stenosis proximal third and severe stenosis distally proximal to the crux.  Moderately severe stenosis mid PDA, with a large posterolateral branch that appears widely patent.  4.  Left circumflex is a very small system.  5.  Normal LVEDP     TRANSTHORACIC ECHOCARDIOGRAPHY:  1. The left ventricle is normal in size. Left ventricular function is  normal.The ejection fraction is 60-65%. There is normal left ventricular wall  thickness. Left ventricular diastolic function is normal. No regional wall  motion abnormalities noted.  2. Normal right ventricle size and systolic function.  3. No hemodynamically significant valvular abnormalities on 2D or color flow  imaging.     CT FFR:  1. The 70-99 % stenosis in the left anterior descending coronary artery proximal to mid segment has a high likelihood of lesion specific ischemia with an FFRct value of 0.67.  2. The 70-99 % stenosis in the right coronary artery mid to distal segment has a high likelihood of lesion specific ischemia with an FFRct value of 0.71.    FFRct Values Summary:  >0.80 (distal to the stenosis): Low likelihood of lesion-specific ischemia  <= 0.80 (distal to the stenosis): High  likelihood of lesion-specific ischemia  Assessment of lesion-specific ischemia by FFRct should rely on FFRct values measured approximately 10-15 mm distal to the lesion of interest, rather than from the terminal vessel. FFRct values <0.80 measured from the terminal vessel do not necessarily indicate lesion-specific ischemia, particularly if there is only mild disease in the more proximal segments of the vessel.      EKG:  Sinus rhythm   Normal ECG   When compared with ECG of 29-AUG-2023 07:54,   No significant change was found      CAROTID US:  Narrative & Impression   EXAM: US CAROTID BILATERAL  LOCATION: [facility]  DATE: 11/22/2023     INDICATION: PRE CABG  COMPARISON: None.  TECHNIQUE: Duplex exam performed utilizing 2D gray-scale imaging, Doppler interrogation with color-flow and spectral waveform analysis. The percent diameter stenosis is determined using NASCET criteria and Society of Radiologists in Ultrasound Consensus   Criteria.     FINDINGS:     RIGHT: Mild plaque at the bifurcation. The peak systolic velocity in the ICA is less than 125 cm/sec, consistent with less than 50% stenosis. Normal velocities in the ECA. Antegrade flow within the vertebral artery.      LEFT: Mild plaque at the bifurcation. The peak systolic velocity in the ICA is less than 125 cm/sec, consistent with less than 50% stenosis. Normal velocities in the ECA. Antegrade flow within the vertebral artery.     VELOCITY CHART:  CCA   Right: 82/26 cm/s   Left: 100/29 cm/s  ICA   Right: 106/40 cm/s   Left: 104/45 cm/s  ECA   Right: 90/13 cm/s   Left: 77/12 cm/s  ICA/CCA PSV Ratio   Right: 1.3   Left: 1.0                                                                      IMPRESSION:  1.  Mild plaque formation, velocities consistent with less than 50% stenosis in the right internal carotid artery.  2.  Mild plaque formation, velocities consistent with less than 50% stenosis in the left internal carotid artery.  3.   Flow within the vertebral arteries is antegrade.

## 2023-12-06 NOTE — ANESTHESIA PROCEDURE NOTES
Arterial Line Procedure Note    Pre-Procedure   Staff -        Anesthesiologist:  Armida Frank MD       Performed By: anesthesiologist       Location: OR       Pre-Anesthestic Checklist: patient identified, IV checked, risks and benefits discussed, informed consent, monitors and equipment checked, pre-op evaluation and at physician/surgeon's request  Timeout:       Correct Patient: Yes        Correct Procedure: Yes        Correct Site: Yes        Correct Position: Yes   Line Placement:   This line was placed Pre Induction starting at 12/6/2023 7:25 AM and ending at 12/6/2023 7:30 AM  Procedure   Procedure: arterial line, elective and new line       Laterality: right       Insertion Site: radial.  Sterile Prep        Standard elements of sterile barrier followed       Skin prep: Chloraprep  Insertion/Injection        Technique: Greg's test completed, Seldinger Technique and ultrasound guided        1. Ultrasound was used to evaluate the access site.       2. Artery evaluated via ultrasound for patency/adequacy.       3. Using real-time ultrasound the needle/catheter was observed entering the artery/vein.       4. Permanent image was captured and entered into the patient's record.       5. The visualized structures were anatomically normal.       6. There were no apparent abnormal pathologic findings.       Catheter Type/Size: 20 G, 12 cm  Narrative         Secured by: suture       Tegaderm and Biopatch dressing used.       Complications: None apparent,        Arterial waveform: Yes        IBP within 10% of NIBP: Yes

## 2023-12-06 NOTE — CONSULTS
"Critical Care Consult Note      12/06/2023    Name: Royce Feliz MRN#: [id number]   Age: 73 year old YOB: 1950                    Problem List:   Principal Problem:    Coronary artery disease due to calcified coronary lesion    Clinically Significant Risk Factors Present on Admission               # Coagulation Defect: INR = 1.33 (Ref range: 0.85 - 1.15) and/or PTT = 57 Seconds (Ref range: 22 - 38 Seconds), will monitor for bleeding  # Drug Induced Platelet Defect: home medication list includes an antiplatelet medication        # Overweight: Estimated body mass index is 28.64 kg/m  as calculated from the following:    Height as of 11/22/23: 1.651 m (5' 5\").    Weight as of this encounter: 78.1 kg (172 lb 1.6 oz).                        HPI:     72yo with hx of HTN, hyperlipidemia and multivessel CAD.  Due to progression of left main disease he underwent revascularization today.      Assessment and plan :       I have personally reviewed the labs, imaging studies, cultures and discussed the case with referring physician and consulting physicians.     My assessment and plan by system for this patient is as follows:    Neurology/Psychiatry:   Sedated with Precedex    Cardiovascular:   Hypertension, hyperlipidemia and multivessel coronary artery disease  Status post CABG x 4  Normal EF  Low-dose epinephrine, 0.02 mcg/kg/min      Pulmonary/Ventilator Management:   Arrived to the ICU intubated.  Wean FiO2 quickly.  Down to 60% and still satting 100%.  ABG and chest x-ray    Plan for an SBT and fast-track wean    Goal extubation time is before 18:08      Renal/Fluids/Electrolytes:     - monitor function and electrolytes as needed with replacement per ICU protocols. - generally avoid nephrotoxic agents such as NSAID, IV contrast unless specifically required  - adjust medications as needed for renal clearance  - follow I/O's as appropriate.    Endocrine:   Transiently on insulin drip.  Currently " off  Plan  - ICU insulin protocol, goal sugar <180      ICU Prophylaxis:   1. DVT: Hep Subq  2. VAP: HOB 30 degrees, chlorhexidine rinse  3. Stress Ulcer: PPI  4. Restraints: Nonviolent soft two point restraints required and necessary for patient safety and continued cares and good effect as patient continues to pull at necessary lines, tubes despite education and distraction. Will readdress daily.     Category: Non-violent   Type of Restraint: Soft limb x2.   Behavior: Pulling at IVand de la rosa catheter tubings.   Root cause of behavior: Critical illness.   Less-restrictive methods that have failed: Redirection, reorientation. 1:1 NA at bedside.   Response to restraint: Not actively pulling atcurrent restraints.   Criteria for release from restraint: Responds to redirection. Leaves medical devices in place.          Medical History:     Past Medical History:   Diagnosis Date    Hypertension     Sleep apnea      Past Surgical History:   Procedure Laterality Date    BIOPSY SKIN (LOCATION)      CV CORONARY ANGIOGRAM N/A 11/22/2023    Procedure: Coronary Angiogram;  Surgeon: Sanjay Arredondo MD;  Location: [facility] CV    CV PCI N/A 11/22/2023    Procedure: Percutaneous Coronary Intervention;  Surgeon: Sanjay Arredondo MD;  Location: [facility] CV    EYE SURGERY       Social History     Socioeconomic History    Marital status:      Spouse name: Not on file    Number of children: Not on file    Years of education: Not on file    Highest education level: Not on file   Occupational History    Not on file   Tobacco Use    Smoking status: Never    Smokeless tobacco: Never   Substance and Sexual Activity    Alcohol use: Yes     Comment: several dyas a week    Drug use: Never    Sexual activity: Not on file   Other Topics Concern    Not on file   Social History Narrative    Not on file     Social Determinants of Health     Financial Resource Strain: Not on file   Food Insecurity: Not on file    Transportation Needs: Not on file   Physical Activity: Not on file   Stress: Not on file   Social Connections: Not on file   Interpersonal Safety: Not on file   Housing Stability: Not on file      No Known Allergies           Key Medications:      acetaminophen  975 mg Oral Q8H    aspirin  162 mg Oral or NG Tube Daily    Or    aspirin  300 mg Rectal Daily    ceFAZolin  1 g Intravenous Q8H    ezetimibe  10 mg Oral QPM    [START ON 12/7/2023] heparin ANTICOAGULANT  5,000 Units Subcutaneous Q8H    [START ON 12/7/2023] lidocaine  1-2 patch Transdermal Q24H    [START ON 12/7/2023] multivitamin, therapeutic  1 tablet Oral Daily    [START ON 12/7/2023] pantoprazole  40 mg Oral or NG Tube Daily    Or    [START ON 12/7/2023] pantoprazole  40 mg Oral Daily    [START ON 12/7/2023] polyethylene glycol  17 g Oral Daily    senna-docusate  1 tablet Oral BID    simvastatin  80 mg Oral At Bedtime      dexmedeTOMIDine      EPINEPHrine      insulin regular      niCARdipine      phenylephrine          Home Meds  No current facility-administered medications on file prior to encounter.  aspirin 81 MG EC tablet, [ASPIRIN 81 MG EC TABLET] Take 1 tablet by mouth.  coenzyme Q10 200 mg capsule, [COENZYME Q10 200 MG CAPSULE] Take 200 mg by mouth daily.  ezetimibe (ZETIA) 10 MG tablet, Take 10 mg by mouth every evening  Multiple Vitamin (MULTIVITAMIN ADULT PO), Take 1 tablet by mouth daily  OMEGA-3/DHA/EPA/FISH OIL (FISH OIL-OMEGA-3 FATTY ACIDS) 300-1,000 mg capsule, Take 2 g by mouth daily  omeprazole (PRILOSEC) 20 MG DR capsule, TAKE ONE CAPSULE BY MOUTH DAILY  sildenafil (VIAGRA) 50 MG tablet, [SILDENAFIL (VIAGRA) 50 MG TABLET] TAKE 1 TABLET BY MOUTH 1 HOUR BEFORE NEEDED  simvastatin (ZOCOR) 80 MG tablet, TAKE 1 TABLET BY MOUTH DAILY  [DISCONTINUED] doxycycline monohydrate (MONODOX) 100 MG capsule, Take 1 capsule (100 mg) by mouth 2 times daily for 10 days               Physical Examination:   Temp:  [98.6  F (37  C)] 98.6  F (37  " C)  Pulse:  [79-82] 82  Resp:  [18-20] 20  BP: (118-120)/(74-76) 120/76  FiO2 (%):  [60 %] 60 %  SpO2:  [93 %-100 %] 100 %    Intake/Output Summary (Last 24 hours) at 12/6/2023 1241  Last data filed at 12/6/2023 1202  Gross per 24 hour   Intake 2405.02 ml   Output 1286 ml   Net 1119.02 ml     Wt Readings from Last 4 Encounters:   12/06/23 78.1 kg (172 lb 1.6 oz)   12/04/23 80.7 kg (178 lb)   11/28/23 80.7 kg (178 lb)   11/22/23 77.1 kg (170 lb)        Vent Mode: CMV/AC  (Continuous Mandatory Ventilation/ Assist Control)  FiO2 (%): 60 %  Resp Rate (Set): 18 breaths/min  Tidal Volume (Set, mL): 500 mL  PEEP (cm H2O): 5 cmH2O  Resp: 20    No lab results found in last 7 days.    GEN: no acute distress   HEENT: head ncat, sclera anicteric, OP patent, trachea midline   PULM: unlabored synchronous with vent, clear anteriorly    CV/COR: RRR S1S2 no gallop,  No rub, no murmur  ABD: soft nontender, hypoactive bowel sounds, no mass  EXT: No edema  NEURO: Sedated  SKIN: no obvious rash  LINES: clean, dry intact         Data:   All data and imaging reviewed     ROUTINE ICU LABS (Last four results)  CMP  Recent Labs   Lab 12/06/23  0544 12/04/23  0827   NA  --  141   POTASSIUM  --  4.4   CHLORIDE  --  106   CO2  --  27   ANIONGAP  --  8   * 106*   BUN  --  20.9   CR  --  1.20*   GFRESTIMATED  --  63   GADIEL  --  9.6   MAG  --  1.9     CBC  Recent Labs   Lab 12/06/23  1119 12/04/23  0827   WBC 22.0* 7.1   RBC 3.49* 4.38*   HGB 11.1* 13.9   HCT 33.2* 41.7   MCV 95 95   MCH 31.8 31.7   MCHC 33.4 33.3   RDW 13.7 13.4    174     INR  Recent Labs   Lab 12/06/23  1119 12/04/23  0827   INR 1.33* 0.93     Arterial Blood GasNo lab results found in last 7 days.    All cultures:  No results for input(s): \"CULT\" in the last 168 hours.  Recent Results (from the past 24 hour(s))   POC US Guided Vascular Access    Narrative    Ultrasound was performed as guidance to an anesthesia procedure.  Click   \"PACS images\" hyperlink below to " "view any stored images.  For specific   procedure details, view procedure note authored by anesthesia.   POC US Guidance Needle Placement    Narrative    Ultrasound was performed as guidance to an anesthesia procedure.  Click   \"PACS images\" hyperlink below to view any stored images.  For specific   procedure details, view procedure note authored by anesthesia.         Billing: This patient is critically ill: Yes. Total critical care time today 33 min exclusive of procedures or teaching.       "

## 2023-12-06 NOTE — TREATMENT PLAN
RCAT Treatment Plan    Patient Score: 10  Patient Acuity: 4    Clinical Indication for Therapy: productive cough and atelectasis    Therapy Ordered: Continue Aerobika/IS QID with RT and with RN per cardiac surgery    Assessment Summary: Denies shortness of breath. Respiratory effort normal. IS to 750. Aerobika for productive cough. Plan is to continue with bronchial hygiene/volume expansion. Consider EzPAP tomorrow if IS remains < 1000 mL. RT to reevaluate daily and as needed.    Michelle Andrade, RT  12/6/2023

## 2023-12-07 ENCOUNTER — APPOINTMENT (OUTPATIENT)
Dept: OCCUPATIONAL THERAPY | Facility: HOSPITAL | Age: 73
DRG: 236 | End: 2023-12-07
Attending: SURGERY
Payer: COMMERCIAL

## 2023-12-07 ENCOUNTER — APPOINTMENT (OUTPATIENT)
Dept: RADIOLOGY | Facility: HOSPITAL | Age: 73
DRG: 236 | End: 2023-12-07
Attending: PHYSICIAN ASSISTANT
Payer: COMMERCIAL

## 2023-12-07 DIAGNOSIS — Z95.1 S/P CABG (CORONARY ARTERY BYPASS GRAFT): Primary | ICD-10-CM

## 2023-12-07 LAB
ALBUMIN SERPL BCG-MCNC: 3.1 G/DL (ref 3.5–5.2)
ALP SERPL-CCNC: 40 U/L (ref 40–150)
ALT SERPL W P-5'-P-CCNC: 20 U/L (ref 0–70)
ANION GAP SERPL CALCULATED.3IONS-SCNC: 8 MMOL/L (ref 7–15)
AST SERPL W P-5'-P-CCNC: 57 U/L (ref 0–45)
ATRIAL RATE - MUSE: 89 BPM
BASE EXCESS BLDA CALC-SCNC: 0 MMOL/L
BILIRUB SERPL-MCNC: 0.4 MG/DL
BUN SERPL-MCNC: 15.1 MG/DL (ref 8–23)
CALCIUM SERPL-MCNC: 8.3 MG/DL (ref 8.8–10.2)
CALCIUM, IONIZED MEASURED: 1.11 MMOL/L (ref 1.11–1.3)
CALCIUM, IONIZED MEASURED: 1.14 MMOL/L (ref 1.11–1.3)
CHLORIDE SERPL-SCNC: 107 MMOL/L (ref 98–107)
COHGB MFR BLD: 97.1 % (ref 95–96)
CREAT SERPL-MCNC: 1.05 MG/DL (ref 0.67–1.17)
DEPRECATED HCO3 PLAS-SCNC: 25 MMOL/L (ref 22–29)
DIASTOLIC BLOOD PRESSURE - MUSE: NORMAL MMHG
EGFRCR SERPLBLD CKD-EPI 2021: 74 ML/MIN/1.73M2
ERYTHROCYTE [DISTWIDTH] IN BLOOD BY AUTOMATED COUNT: 14.1 % (ref 10–15)
GLUCOSE BLDC GLUCOMTR-MCNC: 108 MG/DL (ref 70–99)
GLUCOSE BLDC GLUCOMTR-MCNC: 112 MG/DL (ref 70–99)
GLUCOSE BLDC GLUCOMTR-MCNC: 118 MG/DL (ref 70–99)
GLUCOSE BLDC GLUCOMTR-MCNC: 121 MG/DL (ref 70–99)
GLUCOSE BLDC GLUCOMTR-MCNC: 123 MG/DL (ref 70–99)
GLUCOSE BLDC GLUCOMTR-MCNC: 134 MG/DL (ref 70–99)
GLUCOSE BLDC GLUCOMTR-MCNC: 136 MG/DL (ref 70–99)
GLUCOSE BLDC GLUCOMTR-MCNC: 139 MG/DL (ref 70–99)
GLUCOSE BLDC GLUCOMTR-MCNC: 166 MG/DL (ref 70–99)
GLUCOSE BLDC GLUCOMTR-MCNC: 186 MG/DL (ref 70–99)
GLUCOSE SERPL-MCNC: 134 MG/DL (ref 70–99)
HCO3 BLD-SCNC: 25 MMOL/L (ref 23–29)
HCT VFR BLD AUTO: 30.6 % (ref 40–53)
HGB BLD-MCNC: 10 G/DL (ref 13.3–17.7)
INTERPRETATION ECG - MUSE: NORMAL
ION CA PH 7.4: 1.11 MMOL/L (ref 1.11–1.3)
ION CA PH 7.4: 1.15 MMOL/L (ref 1.11–1.3)
MAGNESIUM SERPL-MCNC: 1.6 MG/DL (ref 1.7–2.3)
MCH RBC QN AUTO: 31.3 PG (ref 26.5–33)
MCHC RBC AUTO-ENTMCNC: 32.7 G/DL (ref 31.5–36.5)
MCV RBC AUTO: 96 FL (ref 78–100)
OXYHGB MFR BLD: 96.9 % (ref 95–96)
P AXIS - MUSE: 46 DEGREES
PCO2 BLD: 41 MM HG (ref 35–45)
PH BLD: 7.39 [PH] (ref 7.37–7.44)
PH: 7.41 (ref 7.35–7.45)
PH: 7.41 (ref 7.35–7.45)
PHOSPHATE SERPL-MCNC: 3.7 MG/DL (ref 2.5–4.5)
PLATELET # BLD AUTO: 140 10E3/UL (ref 150–450)
PO2 BLD: 104 MM HG (ref 75–85)
POTASSIUM SERPL-SCNC: 4 MMOL/L (ref 3.4–5.3)
PR INTERVAL - MUSE: 146 MS
PROT SERPL-MCNC: 4.8 G/DL (ref 6.4–8.3)
QRS DURATION - MUSE: 80 MS
QT - MUSE: 348 MS
QTC - MUSE: 423 MS
R AXIS - MUSE: 16 DEGREES
RBC # BLD AUTO: 3.2 10E6/UL (ref 4.4–5.9)
SODIUM SERPL-SCNC: 140 MMOL/L (ref 135–145)
SYSTOLIC BLOOD PRESSURE - MUSE: NORMAL MMHG
T AXIS - MUSE: 10 DEGREES
TEMPERATURE: 37 DEGREES C
VENTRICULAR RATE- MUSE: 89 BPM
WBC # BLD AUTO: 14.3 10E3/UL (ref 4–11)

## 2023-12-07 PROCEDURE — 83735 ASSAY OF MAGNESIUM: CPT | Performed by: PHYSICIAN ASSISTANT

## 2023-12-07 PROCEDURE — 250N000012 HC RX MED GY IP 250 OP 636 PS 637: Performed by: PHYSICIAN ASSISTANT

## 2023-12-07 PROCEDURE — 94799 UNLISTED PULMONARY SVC/PX: CPT

## 2023-12-07 PROCEDURE — 250N000011 HC RX IP 250 OP 636: Performed by: PHYSICIAN ASSISTANT

## 2023-12-07 PROCEDURE — 93005 ELECTROCARDIOGRAM TRACING: CPT

## 2023-12-07 PROCEDURE — 82330 ASSAY OF CALCIUM: CPT | Performed by: SURGERY

## 2023-12-07 PROCEDURE — 258N000003 HC RX IP 258 OP 636: Performed by: PHYSICIAN ASSISTANT

## 2023-12-07 PROCEDURE — 82805 BLOOD GASES W/O2 SATURATION: CPT | Performed by: PHYSICIAN ASSISTANT

## 2023-12-07 PROCEDURE — 97166 OT EVAL MOD COMPLEX 45 MIN: CPT | Mod: GO

## 2023-12-07 PROCEDURE — 999N000156 HC STATISTIC RCP CONSULT EA 30 MIN

## 2023-12-07 PROCEDURE — 71045 X-RAY EXAM CHEST 1 VIEW: CPT

## 2023-12-07 PROCEDURE — 250N000011 HC RX IP 250 OP 636: Mod: JZ | Performed by: PHYSICIAN ASSISTANT

## 2023-12-07 PROCEDURE — 85027 COMPLETE CBC AUTOMATED: CPT | Performed by: PHYSICIAN ASSISTANT

## 2023-12-07 PROCEDURE — 84100 ASSAY OF PHOSPHORUS: CPT | Performed by: PHYSICIAN ASSISTANT

## 2023-12-07 PROCEDURE — 999N000157 HC STATISTIC RCP TIME EA 10 MIN

## 2023-12-07 PROCEDURE — 250N000011 HC RX IP 250 OP 636: Mod: JZ | Performed by: SURGERY

## 2023-12-07 PROCEDURE — 120N000013 HC R&B IMCU

## 2023-12-07 PROCEDURE — 93010 ELECTROCARDIOGRAM REPORT: CPT | Performed by: INTERNAL MEDICINE

## 2023-12-07 PROCEDURE — 250N000013 HC RX MED GY IP 250 OP 250 PS 637: Performed by: PHYSICIAN ASSISTANT

## 2023-12-07 PROCEDURE — 97110 THERAPEUTIC EXERCISES: CPT | Mod: GO

## 2023-12-07 PROCEDURE — 82330 ASSAY OF CALCIUM: CPT | Performed by: PHYSICIAN ASSISTANT

## 2023-12-07 PROCEDURE — 80053 COMPREHEN METABOLIC PANEL: CPT | Performed by: PHYSICIAN ASSISTANT

## 2023-12-07 PROCEDURE — 97535 SELF CARE MNGMENT TRAINING: CPT | Mod: GO

## 2023-12-07 RX ORDER — NICOTINE POLACRILEX 4 MG
15-30 LOZENGE BUCCAL
Status: DISCONTINUED | OUTPATIENT
Start: 2023-12-07 | End: 2023-12-11 | Stop reason: HOSPADM

## 2023-12-07 RX ORDER — FUROSEMIDE 10 MG/ML
40 INJECTION INTRAMUSCULAR; INTRAVENOUS
Status: DISCONTINUED | OUTPATIENT
Start: 2023-12-07 | End: 2023-12-09

## 2023-12-07 RX ORDER — KETOROLAC TROMETHAMINE 15 MG/ML
15 INJECTION, SOLUTION INTRAMUSCULAR; INTRAVENOUS EVERY 6 HOURS PRN
Status: DISCONTINUED | OUTPATIENT
Start: 2023-12-07 | End: 2023-12-11 | Stop reason: HOSPADM

## 2023-12-07 RX ORDER — DEXTROSE MONOHYDRATE 25 G/50ML
25-50 INJECTION, SOLUTION INTRAVENOUS
Status: DISCONTINUED | OUTPATIENT
Start: 2023-12-07 | End: 2023-12-11 | Stop reason: HOSPADM

## 2023-12-07 RX ORDER — FUROSEMIDE 10 MG/ML
40 INJECTION INTRAMUSCULAR; INTRAVENOUS
Status: DISCONTINUED | OUTPATIENT
Start: 2023-12-07 | End: 2023-12-07

## 2023-12-07 RX ORDER — MAGNESIUM SULFATE HEPTAHYDRATE 40 MG/ML
2 INJECTION, SOLUTION INTRAVENOUS ONCE
Status: COMPLETED | OUTPATIENT
Start: 2023-12-07 | End: 2023-12-07

## 2023-12-07 RX ADMIN — OXYCODONE HYDROCHLORIDE 5 MG: 5 TABLET ORAL at 08:21

## 2023-12-07 RX ADMIN — FUROSEMIDE 40 MG: 10 INJECTION, SOLUTION INTRAMUSCULAR; INTRAVENOUS at 10:08

## 2023-12-07 RX ADMIN — KETOROLAC TROMETHAMINE 15 MG: 15 INJECTION, SOLUTION INTRAMUSCULAR; INTRAVENOUS at 12:29

## 2023-12-07 RX ADMIN — OXYCODONE HYDROCHLORIDE 5 MG: 5 TABLET ORAL at 12:30

## 2023-12-07 RX ADMIN — OXYCODONE HYDROCHLORIDE 5 MG: 5 TABLET ORAL at 04:26

## 2023-12-07 RX ADMIN — CALCIUM GLUCONATE 1 G: 20 INJECTION, SOLUTION INTRAVENOUS at 02:18

## 2023-12-07 RX ADMIN — FUROSEMIDE 40 MG: 10 INJECTION, SOLUTION INTRAMUSCULAR; INTRAVENOUS at 18:39

## 2023-12-07 RX ADMIN — SODIUM CHLORIDE, POTASSIUM CHLORIDE, SODIUM LACTATE AND CALCIUM CHLORIDE 250 ML: 600; 310; 30; 20 INJECTION, SOLUTION INTRAVENOUS at 02:07

## 2023-12-07 RX ADMIN — POLYETHYLENE GLYCOL 3350 17 G: 17 POWDER, FOR SOLUTION ORAL at 08:16

## 2023-12-07 RX ADMIN — HYDROMORPHONE HYDROCHLORIDE 0.2 MG: 0.2 INJECTION, SOLUTION INTRAMUSCULAR; INTRAVENOUS; SUBCUTANEOUS at 06:02

## 2023-12-07 RX ADMIN — SENNOSIDES AND DOCUSATE SODIUM 1 TABLET: 8.6; 5 TABLET ORAL at 08:16

## 2023-12-07 RX ADMIN — METOPROLOL TARTRATE 12.5 MG: 25 TABLET, FILM COATED ORAL at 12:19

## 2023-12-07 RX ADMIN — CEFAZOLIN 1 G: 1 INJECTION, POWDER, FOR SOLUTION INTRAMUSCULAR; INTRAVENOUS at 03:07

## 2023-12-07 RX ADMIN — SIMVASTATIN 80 MG: 40 TABLET, FILM COATED ORAL at 20:31

## 2023-12-07 RX ADMIN — EZETIMIBE 10 MG: 10 TABLET ORAL at 20:30

## 2023-12-07 RX ADMIN — HYDROMORPHONE HYDROCHLORIDE 0.2 MG: 0.2 INJECTION, SOLUTION INTRAMUSCULAR; INTRAVENOUS; SUBCUTANEOUS at 02:38

## 2023-12-07 RX ADMIN — ACETAMINOPHEN 975 MG: 325 TABLET ORAL at 13:16

## 2023-12-07 RX ADMIN — THERA TABS 1 TABLET: TAB at 08:16

## 2023-12-07 RX ADMIN — PANTOPRAZOLE SODIUM 40 MG: 40 TABLET, DELAYED RELEASE ORAL at 08:16

## 2023-12-07 RX ADMIN — CEFAZOLIN 1 G: 1 INJECTION, POWDER, FOR SOLUTION INTRAMUSCULAR; INTRAVENOUS at 12:18

## 2023-12-07 RX ADMIN — ACETAMINOPHEN 975 MG: 325 TABLET ORAL at 22:09

## 2023-12-07 RX ADMIN — SENNOSIDES AND DOCUSATE SODIUM 1 TABLET: 8.6; 5 TABLET ORAL at 20:31

## 2023-12-07 RX ADMIN — INSULIN ASPART 1 UNITS: 100 INJECTION, SOLUTION INTRAVENOUS; SUBCUTANEOUS at 12:17

## 2023-12-07 RX ADMIN — ACETAMINOPHEN 975 MG: 325 TABLET ORAL at 05:16

## 2023-12-07 RX ADMIN — LIDOCAINE 1 PATCH: 4 PATCH TOPICAL at 08:16

## 2023-12-07 RX ADMIN — ASPIRIN 162 MG: 81 TABLET, CHEWABLE ORAL at 08:16

## 2023-12-07 RX ADMIN — HEPARIN SODIUM 5000 UNITS: 5000 INJECTION, SOLUTION INTRAVENOUS; SUBCUTANEOUS at 13:18

## 2023-12-07 RX ADMIN — METOPROLOL TARTRATE 12.5 MG: 25 TABLET, FILM COATED ORAL at 17:46

## 2023-12-07 RX ADMIN — HEPARIN SODIUM 5000 UNITS: 5000 INJECTION, SOLUTION INTRAVENOUS; SUBCUTANEOUS at 22:10

## 2023-12-07 RX ADMIN — MAGNESIUM SULFATE HEPTAHYDRATE 2 G: 40 INJECTION, SOLUTION INTRAVENOUS at 04:45

## 2023-12-07 ASSESSMENT — ACTIVITIES OF DAILY LIVING (ADL)
THE_FOLLOWING_AIDS_WERE_PROVIDED;: PATIENT DECLINED OFFER OF AUXILIARY AIDS
ADLS_ACUITY_SCORE: 25
DIFFICULTY_COMMUNICATING: NO
DRESSING/BATHING_DIFFICULTY: NO
DESCRIBE_HEARING_LOSS: BILATERAL HEARING LOSS
WALKING_OR_CLIMBING_STAIRS_DIFFICULTY: NO
DOING_ERRANDS_INDEPENDENTLY_DIFFICULTY: NO
CHANGE_IN_FUNCTIONAL_STATUS_SINCE_ONSET_OF_CURRENT_ILLNESS/INJURY: NO
ADLS_ACUITY_SCORE: 25
DIFFICULTY_EATING/SWALLOWING: NO
ADLS_ACUITY_SCORE: 23
ADLS_ACUITY_SCORE: 26
ADLS_ACUITY_SCORE: 24
ADLS_ACUITY_SCORE: 23
WEAR_GLASSES_OR_BLIND: YES
USE_OF_HEARING_ASSISTIVE_DEVICES: BILATERAL HEARING AIDS
PATIENT'S_PREFERRED_MEANS_OF_COMMUNICATION: VERBAL
HEARING_DIFFICULTY_OR_DEAF: YES
ADLS_ACUITY_SCORE: 23
ADLS_ACUITY_SCORE: 23
DEPENDENT_IADLS:: INDEPENDENT
CONCENTRATING,_REMEMBERING_OR_MAKING_DECISIONS_DIFFICULTY: NO
ADLS_ACUITY_SCORE: 27
TOILETING_ISSUES: NO
WERE_AUXILIARY_AIDS_OFFERED?: NO
ADLS_ACUITY_SCORE: 24
VISION_MANAGEMENT: GLASSES
FALL_HISTORY_WITHIN_LAST_SIX_MONTHS: NO
ADLS_ACUITY_SCORE: 24
ADLS_ACUITY_SCORE: 27

## 2023-12-07 NOTE — TREATMENT PLAN
RCAT Treatment Plan    Patient Score: 12  Patient Acuity: 3    Clinical Indication for Therapy: productive cough and atelectasis    Therapy Ordered: IS and Aerobika TID with RT.    Assessment Summary: Denies shortness of breath. Respiratory effort normal. IS to 1000 mL. Using Aerobika for a productive cough. RT to reevaluate daily and PRN.    Michelle Andrade, RT  12/7/2023

## 2023-12-07 NOTE — PLAN OF CARE
Problem: Adult Inpatient Plan of Care  Goal: Optimal Comfort and Wellbeing  Intervention: Provide Person-Centered Care  Recent Flowsheet Documentation  Taken 12/6/2023 2000 by Scarlet Fair RN  Trust Relationship/Rapport:   care explained   emotional support provided  Taken 12/6/2023 1600 by Scarlet Fair RN  Trust Relationship/Rapport:   care explained   emotional support provided   Goal Outcome Evaluation:         [facility] - ICU    RN Progress Note:            Pertinent Assessments:      Please refer to flowsheet rows for full assessment     Royce was extubated at 1605 to 3L NC. He is able to achieve 1000 on IS, cough and deep breaths. Has mouth guard in for sleep apnea. He is neuro intact. NSR with some ST elevation on monitor. Sinus tach improved with weaning of Epi gtt. Pacer on standby. 1L of LR given to achieve map goal.  Friction rub present. Temp of 101.2, tylenol given. Some swelling to left leg graft site, pedal pulse weakly palpable. Adequate urine output, appropriate chest tube output. Tolerating ice chips and sips of h2o. Bereket at .4mcq/kg/min. Dilaudid given x1 for pain.                 Barriers to Discharge / Downgrade:     ICU/pressors/epicardial pacer         Point of Contact Update YES-OR-NO: Yes  If No, reason:   Name:Iman  Phone Number:As listed in chart.  Summary of Conversation: Same as above.

## 2023-12-07 NOTE — PROGRESS NOTES
[facility] - ICU    RN Progress Note:            Pertinent Assessments:      Please refer to flowsheet rows for full assessment     Lung sounds clear and diminished. Friction rub present. Trace edema in left leg at start of shift, by end of night generalized +1 edema.   Patient follows commands well and participates in activities. Gets IS to 1000ml. 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           Key Events - This Shift:     Patient had some breakthrough pain but was managed well with lowest doses of PRN medications. Pain tends to be near chest tubes. Chest tube output on the lower side. See flowsheets.   Patient up to chair with assistance of 2 staff.                Barriers to Discharge / Downgrade:     Bereket drip has been off for a few hours, lines not pulled yet because patient pressures while within goal range most of the time, have remained soft.

## 2023-12-07 NOTE — PROGRESS NOTES
"CVTS Daily Progress Note   POD#1 s/p CAB x 4  Attending: Dr Schwab  LOS: 1    SUBJECTIVE/INTERVAL EVENTS:    Patient arrived to ICU from OR yesterday afternoon. He was subsequently extubated and weaned from pressors. No acute events overnight. Patient progressing well. Maintaining oxygen saturations on 3L NC. Normotensive. Up to chair this am. Pain well controlled. -BM / flatus. Tolerating clear liquid diet. UOP adequate. Chest tube output appropriate. Hgb 10.0. Patient denies new chest pain, shortness of breath, abdominal pain, calf pain, nausea. Patient has no questions today.     OBJECTIVE:  Temp:  [98.1  F (36.7  C)-101.2  F (38.4  C)] 98.2  F (36.8  C)  Pulse:  [] 91  Resp:  [14-31] 20  BP: (104-129)/(55-67) 108/61  MAP:  [60 mmHg-242 mmHg] 73 mmHg  Arterial Line BP: ()/() 95/55  FiO2 (%):  [60 %] 60 %  SpO2:  [92 %-100 %] 97 %  Vitals:    12/06/23 0646   Weight: 78.1 kg (172 lb 1.6 oz)       Clinically Significant Risk Factors        # Hypokalemia: Lowest K = 2.9 mmol/L in last 2 days, will replace as needed   # Hypocalcemia: Lowest iCa = 0.94 mg/dL in last 2 days, will monitor and replace as appropriate   # Hypomagnesemia: Lowest Mg = 1.6 mg/dL in last 2 days, will replace as needed   # Hypoalbuminemia: Lowest albumin = 3.1 g/dL at 12/7/2023  3:58 AM, will monitor as appropriate  # Coagulation Defect: INR = 1.17 (Ref range: 0.85 - 1.15) and/or PTT = 43 Seconds (Ref range: 22 - 38 Seconds), will monitor for bleeding           # Overweight: Estimated body mass index is 28.64 kg/m  as calculated from the following:    Height as of 11/22/23: 1.651 m (5' 5\").    Weight as of this encounter: 78.1 kg (172 lb 1.6 oz)., PRESENT ON ADMISSION      # History of CABG: noted on surgical history      Current Medications:    Scheduled Meds:   acetaminophen  975 mg Oral Q8H    aspirin  162 mg Oral or NG Tube Daily    Or    aspirin  300 mg Rectal Daily    ceFAZolin  1 g Intravenous Q8H    ezetimibe  10 mg " Oral QPM    heparin ANTICOAGULANT  5,000 Units Subcutaneous Q8H    lidocaine  1-2 patch Transdermal Q24H    multivitamin, therapeutic  1 tablet Oral Daily    pantoprazole  40 mg Oral or NG Tube Daily    Or    pantoprazole  40 mg Oral Daily    polyethylene glycol  17 g Oral Daily    senna-docusate  1 tablet Oral BID    simvastatin  80 mg Oral At Bedtime     Continuous Infusions:   dexmedeTOMIDine Stopped (12/06/23 1330)    EPINEPHrine Stopped (12/06/23 1947)    insulin regular Stopped (12/07/23 0817)    niCARdipine      phenylephrine Stopped (12/07/23 0408)     PRN Meds:.[START ON 12/9/2023] acetaminophen, bisacodyl, calcium gluconate, calcium gluconate, calcium gluconate, glucose **OR** dextrose **OR** glucagon, EPINEPHrine, hydrALAZINE, HYDROmorphone **OR** HYDROmorphone, lactated ringers, magnesium hydroxide, naloxone **OR** naloxone **OR** naloxone **OR** naloxone, niCARdipine, ondansetron **OR** ondansetron, oxyCODONE **OR** oxyCODONE, phenylephrine, prochlorperazine **OR** prochlorperazine    Cardiographics:    Telemetry monitoring demonstrates NSR with rates in the 80-90 per my personal review.    Imaging:  Results for orders placed or performed during the hospital encounter of 12/06/23   XR Chest Port 1 View    Impression    IMPRESSION: Postsurgical changes from interval coronary artery bypass grafting. Endotracheal tube terminates approximately 3.9 cm above the nancy. Enteric decompression tube tip is in the lower esophagus and could be advanced at least 10 cm to reach the   stomach. Right neck central venous catheter tip in the upper SVC. Bilateral chest tubes and ascending mediastinal drain also present.    Low lung volumes with bibasilar atelectasis. No pleural effusion or pneumothorax.    Normal cardiomediastinal silhouette with median sternotomy wires and coronary surgical clips.   XR Chest Port 1 View    Impression    IMPRESSION: Poststernotomy and coronary artery bypass grafting. Mediastinal and  bilateral pleural drains remain in position. Right internal jugular venous catheter tip in the SVC. Mild atelectasis in both lower lungs. No visible pneumothorax. Stable mild   cardiomegaly. Normal pulmonary vascularity.       Labs, personally reviewed.  Hemoglobin   Date Value Ref Range Status   12/07/2023 10.0 (L) 13.3 - 17.7 g/dL Final   12/06/2023 11.5 (L) 13.3 - 17.7 g/dL Final   12/06/2023 11.1 (L) 13.3 - 17.7 g/dL Final     Hemoglobin POCT   Date Value Ref Range Status   12/06/2023 11.7 (L) 13.3 - 17.7 g/dL Final   12/06/2023 10.5 (L) 13.3 - 17.7 g/dL Final   12/06/2023 10.1 (L) 13.3 - 17.7 g/dL Final     WBC Count   Date Value Ref Range Status   12/07/2023 14.3 (H) 4.0 - 11.0 10e3/uL Final   12/06/2023 23.0 (H) 4.0 - 11.0 10e3/uL Final   12/06/2023 22.0 (H) 4.0 - 11.0 10e3/uL Final     Platelet Count   Date Value Ref Range Status   12/07/2023 140 (L) 150 - 450 10e3/uL Final   12/06/2023 142 (L) 150 - 450 10e3/uL Final   12/06/2023 161 150 - 450 10e3/uL Final     Creatinine   Date Value Ref Range Status   12/07/2023 1.05 0.67 - 1.17 mg/dL Final   12/06/2023 1.12 0.67 - 1.17 mg/dL Final   12/04/2023 1.20 (H) 0.67 - 1.17 mg/dL Final     Potassium   Date Value Ref Range Status   12/07/2023 4.0 3.4 - 5.3 mmol/L Final   12/06/2023 4.1 3.4 - 5.3 mmol/L Final   12/06/2023 4.1 3.4 - 5.3 mmol/L Final   11/17/2021 4.3 3.5 - 5.0 mmol/L Final   10/26/2020 4.9 3.5 - 5.0 mmol/L Final   09/25/2019 4.1 3.5 - 5.0 mmol/L Final     Potassium POCT   Date Value Ref Range Status   12/06/2023 4.5 3.5 - 5.0 mmol/L Final   12/06/2023 5.0 3.5 - 5.0 mmol/L Final   12/06/2023 4.8 3.5 - 5.0 mmol/L Final     Magnesium   Date Value Ref Range Status   12/07/2023 1.6 (L) 1.7 - 2.3 mg/dL Final   12/06/2023 2.8 (H) 1.7 - 2.3 mg/dL Final   12/04/2023 1.9 1.7 - 2.3 mg/dL Final          I/O:  I/O last 3 completed shifts:  In: 5186.4 [I.V.:3281.4; Other:205; IV Piggyback:1700]  Out: 3424 [Urine:2696; Blood:300; Chest Tube:428]       Physical  Exam:    General: Patient seen up in chair. NAD. Conversant. Pleasant.   HEENT: ANIBAL, no sclera icterus, moist mucosa  CV: RRR on monitor. 2+ peripheral pulses in all extremities. Mild edema.   Pulm: Non-labored effort on 3L NC. Chest tubes in place, serosanguinous output, no air leak.  Incision C/D/I.  Abd: Soft, NT, ND  : Boggs with rubén urine  Ext: Mild pedal edema, SCDs in place, warm, distal pulses intact  Neuro: CNs grossly intact.       ASSESSMENT/PLAN:    Royce Feliz is a 73 year old male with a history of CAD who is s/p CAB x 4.    Principal Problem:    Coronary artery disease due to calcified coronary lesion        NEURO:   - Scheduled Tylenol/lidocaine patches and PRN Tylenol/oxycodone/dilaudid for pain    CV:   - Pre-op EF 60-65%  - Normotensive  - Pressors off  - Metoprolol 12.5 mg two times a day with parameters and extra doses available prn  - ASA 162mg  - Simvastatin 80mg daily  - Chest tubes to remain today    PULM:   - Maintaining oxygen saturations on 3L NC  - Encourage pulmonary toilet    FEN/GI:  - Continue electrolyte replacement protocol  - Diet: Cardiac, ADAT   - Bowel regimen    RENAL:  - Adequate UOP/hr. Continue to monitor closely.  - Cr 1.05  - Boggs to remain in for close monitoring of I/O and during period of diuresis/relative immobility.   - Diuresis with 40mg IV Lasix two times a day     HEME:  - Acute blood loss anemia post-op.   - Hgb 10.0, no bleeding concerns. Hep SQ, ASA    ID:  - Ashanti op ppx complete, afebrile. No concerns for infection    ENDO:    - Transition to SSI    PPx:   - DVT: SCDs, SQ heparin TID, ambulation   - GI: Protonix 40mg PO daily    DISPO:   - Transfer to general telemetry status      Patient discussed with Dr. Deng.    Maggie Myers PA-C  [facility] Cardiothoracic Surgery  Vocera or pager [phone]

## 2023-12-07 NOTE — CONSULTS
Care Management Initial Consult    General Information  Assessment completed with: Patient,    Type of CM/SW Visit: Initial Assessment    Primary Care Provider verified and updated as needed: Yes   Readmission within the last 30 days: no previous admission in last 30 days      Reason for Consult: discharge planning  Advance Care Planning: Advance Care Planning Reviewed: present on chart          Communication Assessment  Patient's communication style: spoken language (English or Bilingual)    Hearing Difficulty or Deaf: no   Wear Glasses or Blind: yes    Cognitive  Cognitive/Neuro/Behavioral: WDL  Level of Consciousness: alert  Arousal Level: opens eyes spontaneously  Orientation: oriented x 4  Mood/Behavior: cooperative, behavior appropriate to situation  Best Language: 0 - No aphasia  Speech: clear    Living Environment:   People in home: spouse     Current living Arrangements: house      Able to return to prior arrangements: yes       Family/Social Support:  Care provided by: self  Provides care for: no one  Marital Status:   Wife  Chelsea       Description of Support System: Supportive         Current Resources:   Patient receiving home care services: No     Community Resources: None  Equipment currently used at home: none  Supplies currently used at home:  (CPAP)    Employment/Financial:  Employment Status: retired        Financial Concerns: none           Does the patient's insurance plan have a 3 day qualifying hospital stay waiver?  No    Lifestyle & Psychosocial Needs:  Social Determinants of Health     Food Insecurity: Not on file   Depression: Not at risk (8/29/2023)    PHQ-2     PHQ-2 Score: 0   Housing Stability: Not on file   Tobacco Use: Low Risk  (12/7/2023)    Patient History     Smoking Tobacco Use: Never     Smokeless Tobacco Use: Never     Passive Exposure: Not on file   Financial Resource Strain: Not on file   Alcohol Use: Not on file   Transportation Needs: Not on file   Physical  Activity: Not on file   Interpersonal Safety: Not on file   Stress: Not on file   Social Connections: Not on file       Functional Status:  Prior to admission patient needed assistance:   Dependent ADLs:: Independent  Dependent IADLs:: Independent       Mental Health Status:  Mental Health Status: No Current Concerns (denies concerns)       Chemical Dependency Status:  Chemical Dependency Status: No Current Concerns (denies concerns)             Values/Beliefs:  Spiritual, Cultural Beliefs, Adventist Practices, Values that affect care: no               Additional Information:  Assessed.  Pt lives in a house with wife Chelsea, reports he is independent with all I/ADL's at baseline.  CPAP at home.  No other DME, services reported.  Pt hopes to discharge home, family can transport.    12/6-CABGx4    OT recommends discharge home with OP cardiac rehab.    CM will continue to follow care progression and aide in discharge planning as needed.       Dorothy Martini RN

## 2023-12-07 NOTE — CONSULTS
SPIRITUAL HEALTH SERVICES Note     Saw pt Royce Feliz and administered Jew sacrament of anointing for the healing of the sick.    Fr. Cj Davidson

## 2023-12-07 NOTE — PROGRESS NOTES
12/07/23 0743   Appointment Info   Signing Clinician's Name / Credentials (OT) Manda Kohler OTR/L OTD   Living Environment   People in Home spouse   Current Living Arrangements house   Home Accessibility stairs to enter home   Number of Stairs, Main Entrance 1   Living Environment Comments Has elevator from basement-2nd floor, bedroom and bathroom on upper level   Self-Care   Usual Activity Tolerance good   Current Activity Tolerance moderate   Regular Exercise Yes   Activity/Exercise Type walking;strength training;running/jogging   Exercise Amount/Frequency 3-5 times/wk   Equipment Currently Used at Home none   Fall history within last six months no   Activity/Exercise/Self-Care Comment Ind with all ADLs and IADLs   General Information   Onset of Illness/Injury or Date of Surgery 12/06/23   Referring Physician Rene Schwab MD   Patient/Family Therapy Goal Statement (OT) To get stronger, to heal safely   Additional Occupational Profile Info/Pertinent History of Current Problem This patient is a 72 year old male who presents to discuss the option of coronary artery bypass grafting for treatment of his severe multivessel coronary artery disease. He has a strong family history of premature coronary artery disease, and sadly his brother passed away from a massive MI. The patient has coronary artery disease risk factors including hypertension, dyslipidemia, and type 2 diabetes mellitus. He underwent a coronary CTA with FFR that demonstrates severe multivessel coronary artery disease with flow limiting stenoses. He has no chest pain or pressure. He denies palpitations. He is quite active and otherwise healthy. He denies heart failure symptoms.   Existing Precautions/Restrictions cardiac;sternal   Left Upper Extremity (Weight-bearing Status) other (see comments)  (sternal)   Right Upper Extremity (Weight-bearing Status) other (see comments)  (sternal)   Cognitive Status Examination   Orientation Status  orientation to person, place and time   Affect/Mental Status (Cognitive) WNL   Follows Commands WNL   Visual Perception   Visual Impairment/Limitations legally blind  (L eye blind)   Posture   Posture forward head position   Range of Motion Comprehensive   General Range of Motion no range of motion deficits identified   Comment, General Range of Motion Not formally assessed d/t sternal precautions   Strength Comprehensive (MMT)   General Manual Muscle Testing (MMT) Assessment no strength deficits identified   Comment, General Manual Muscle Testing (MMT) Assessment Not formally assessed d/t sternal precautions   Bed Mobility   Bed Mobility supine-sit   Supine-Sit [place] (Bed Mobility) moderate assist (50% patient effort);2 person assist   Comment (Bed Mobility) D/t lines/tubes   Transfers   Transfers sit-stand transfer;toilet transfer   Sit-Stand Transfer   Sit-Stand [place] (Transfers) contact guard;2 person assist   Toilet Transfer   [place] Level (Toilet Transfer) minimum assist (75% patient effort)   Balance   Balance Assessment standing dynamic balance   Standing Balance: Dynamic contact guard   Activities of Daily Living   BADL Assessment/Intervention lower body dressing;toileting   Lower Body Dressing Assessment/Training   [place] Level (Lower Body Dressing) maximum assist (25% patient effort)   Toileting   [place] Level (Toileting) moderate assist (50% patient effort)   Clinical Impression   Criteria for Skilled Therapeutic Interventions Met (OT) Yes, treatment indicated   OT Diagnosis Decreased activity tolerance and ind with ADLs   Influenced by the following impairments S/p CABGx4   OT Problem List-Impairments impacting ADL activity tolerance impaired;balance;mobility;strength;post-surgical precautions;pain   Assessment of Occupational Performance 3-5 Performance Deficits   Identified Performance Deficits fxl transfers, activity tolerance, dressing, toileting, bed mobility    Planned Therapy Interventions (OT) ADL retraining;bed mobility training;transfer training;progressive activity/exercise;risk factor education;home program guidelines   Clinical Decision Making Complexity (OT) detailed assessment/moderate complexity   Risk & Benefits of therapy have been explained evaluation/treatment results reviewed;care plan/treatment goals reviewed;risks/benefits reviewed;current/potential barriers reviewed;patient   OT Total Evaluation Time   OT Eval, Moderate Complexity Minutes (43158) 10   OT Goals   Therapy Frequency (OT) 2 times/day   OT Predicted Duration/Target Date for Goal Attainment 12/13/23   OT Goals Cardiac Phase 1   OT: Understanding of cardiac education to maximize quality of life, condition management, and health outcomes Patient;Verbalize   OT: Perform aerobic activity with stable cardiovascular response intermittent;10 minutes   OT: Functional/aerobic ambulation tolerance with stable cardiovascular response in order to return to home and community environment Independent;Greater than 300 feet   OT: Navigation of stairs simulating home set up with stable cardiovascular response in order to return to home and community environment Modified independent;1 stair   Interventions   Interventions Quick Adds Cardiac Rehab;Therapeutic Procedures/Exercise;Self-Care/Home Management   Self-Care/Home Management   Self-Care/Home Mgmt/ADL, Compensatory, Meal Prep Minutes (16475) 10   Symptoms Noted During/After Treatment (Meal Preparation/Planning Training) none   Treatment Detail/Skilled Intervention Educated on role of CR and progression of therapy throughout admission, educated on sternal precautions related to ADL tasks and transfers - see cardiac education section. STS CGA X2 for line mgmt and balance.   Therapeutic Procedures/Exercise   Therapeutic Procedure: strength, endurance, ROM, flexibillity minutes (55258) 12   Symptoms Noted During/After Treatment fatigue   Treatment  Detail/Skilled Intervention See ambulation/calisthenics   Treatment Time Includes (CR Only) See specific exercise details intervention group(s);Monitoring of vital signs (see vital signs flowsheet for details);Extra time managing multiple lines/tubes   Ambulation   Workload Standing tolerance, marching in place   Duration (minutes) 5 minutes   Symptoms Fatigue   Cardiovascular Response Normal   Exercise Details Tolerated standing in front of chair ~ 4 minutes, marching in place and weight shifting CGA, min cueing for posture   Vital Signs Details BP (WNL - not reading with art line) HR remained between 85-90 with activity   Calisthenics   Type Knee Bends;Knee Flexion;March in place;Hip Abductors  (seated)   Duration (minutes) 5 minutes   Symptoms Denies symptoms   Cardiovascular Response Normal   Exercise Details tolerated seated exer well - motivated to particpate - 2 sets x 10-12 reps   Vital Signs Details HR between 75-83   Cardiac Education   Education Provided Precautions;Outpatient Cardiac Rehab   Education Packet Given to Patient No   All Patient Education Handouts Reviewed with Patient and/or Family No   Cardiac Rehab Phase II Plan   Phase II Order Received Yes   Phase II Appointment Status Scheduled   Date/Time 12/19/24   Location [facility]   OT Discharge Planning   OT Plan Progress standing tolerance, ambulation once lines out, precautions   OT Discharge Recommendation (DC Rec) home with outpatient cardiac rehab   OT Rationale for DC Rec Pt active at baseline and has great support from wife, recommend OP CR to progress activity tolerance   OT Brief overview of current status CGA STS and standing tolerance   Total Session Time   Timed Code Treatment Minutes 22   Total Session Time (sum of timed and untimed services) 32

## 2023-12-08 ENCOUNTER — APPOINTMENT (OUTPATIENT)
Dept: OCCUPATIONAL THERAPY | Facility: HOSPITAL | Age: 73
DRG: 236 | End: 2023-12-08
Attending: SURGERY
Payer: COMMERCIAL

## 2023-12-08 LAB
CALCIUM, IONIZED MEASURED: 1.16 MMOL/L (ref 1.11–1.3)
GLUCOSE BLDC GLUCOMTR-MCNC: 108 MG/DL (ref 70–99)
GLUCOSE BLDC GLUCOMTR-MCNC: 123 MG/DL (ref 70–99)
GLUCOSE BLDC GLUCOMTR-MCNC: 134 MG/DL (ref 70–99)
GLUCOSE BLDC GLUCOMTR-MCNC: 135 MG/DL (ref 70–99)
HOLD SPECIMEN: NORMAL
ION CA PH 7.4: 1.15 MMOL/L (ref 1.11–1.3)
MAGNESIUM SERPL-MCNC: 2.1 MG/DL (ref 1.7–2.3)
PH: 7.38 (ref 7.35–7.45)
PHOSPHATE SERPL-MCNC: 2.5 MG/DL (ref 2.5–4.5)
POTASSIUM SERPL-SCNC: 4 MMOL/L (ref 3.4–5.3)

## 2023-12-08 PROCEDURE — 258N000003 HC RX IP 258 OP 636: Performed by: PHYSICIAN ASSISTANT

## 2023-12-08 PROCEDURE — 258N000003 HC RX IP 258 OP 636: Performed by: SURGERY

## 2023-12-08 PROCEDURE — 999N000156 HC STATISTIC RCP CONSULT EA 30 MIN

## 2023-12-08 PROCEDURE — 250N000009 HC RX 250: Performed by: SURGERY

## 2023-12-08 PROCEDURE — 120N000013 HC R&B IMCU

## 2023-12-08 PROCEDURE — 250N000011 HC RX IP 250 OP 636: Mod: JZ | Performed by: PHYSICIAN ASSISTANT

## 2023-12-08 PROCEDURE — 36415 COLL VENOUS BLD VENIPUNCTURE: CPT | Performed by: PHYSICIAN ASSISTANT

## 2023-12-08 PROCEDURE — 84132 ASSAY OF SERUM POTASSIUM: CPT | Performed by: PHYSICIAN ASSISTANT

## 2023-12-08 PROCEDURE — 250N000013 HC RX MED GY IP 250 OP 250 PS 637: Performed by: PHYSICIAN ASSISTANT

## 2023-12-08 PROCEDURE — 999N000157 HC STATISTIC RCP TIME EA 10 MIN

## 2023-12-08 PROCEDURE — 97535 SELF CARE MNGMENT TRAINING: CPT | Mod: GO

## 2023-12-08 PROCEDURE — 82330 ASSAY OF CALCIUM: CPT | Performed by: PHYSICIAN ASSISTANT

## 2023-12-08 PROCEDURE — 84100 ASSAY OF PHOSPHORUS: CPT | Performed by: PHYSICIAN ASSISTANT

## 2023-12-08 PROCEDURE — 83735 ASSAY OF MAGNESIUM: CPT | Performed by: PHYSICIAN ASSISTANT

## 2023-12-08 PROCEDURE — 97110 THERAPEUTIC EXERCISES: CPT | Mod: GO

## 2023-12-08 RX ADMIN — SENNOSIDES AND DOCUSATE SODIUM 1 TABLET: 8.6; 5 TABLET ORAL at 20:16

## 2023-12-08 RX ADMIN — PANTOPRAZOLE SODIUM 40 MG: 40 TABLET, DELAYED RELEASE ORAL at 08:11

## 2023-12-08 RX ADMIN — METOPROLOL TARTRATE 12.5 MG: 25 TABLET, FILM COATED ORAL at 16:29

## 2023-12-08 RX ADMIN — OXYCODONE HYDROCHLORIDE 5 MG: 5 TABLET ORAL at 10:51

## 2023-12-08 RX ADMIN — EZETIMIBE 10 MG: 10 TABLET ORAL at 20:17

## 2023-12-08 RX ADMIN — FUROSEMIDE 40 MG: 10 INJECTION, SOLUTION INTRAMUSCULAR; INTRAVENOUS at 08:12

## 2023-12-08 RX ADMIN — SENNOSIDES AND DOCUSATE SODIUM 1 TABLET: 8.6; 5 TABLET ORAL at 08:12

## 2023-12-08 RX ADMIN — SODIUM PHOSPHATE, MONOBASIC, MONOHYDRATE AND SODIUM PHOSPHATE, DIBASIC, ANHYDROUS 9 MMOL: 142; 276 INJECTION, SOLUTION INTRAVENOUS at 06:08

## 2023-12-08 RX ADMIN — METOPROLOL TARTRATE 12.5 MG: 25 TABLET, FILM COATED ORAL at 10:08

## 2023-12-08 RX ADMIN — MAGNESIUM HYDROXIDE 30 ML: 400 SUSPENSION ORAL at 05:19

## 2023-12-08 RX ADMIN — ACETAMINOPHEN 975 MG: 325 TABLET ORAL at 13:31

## 2023-12-08 RX ADMIN — ASPIRIN 162 MG: 81 TABLET, CHEWABLE ORAL at 08:13

## 2023-12-08 RX ADMIN — SODIUM CHLORIDE, POTASSIUM CHLORIDE, SODIUM LACTATE AND CALCIUM CHLORIDE 250 ML: 600; 310; 30; 20 INJECTION, SOLUTION INTRAVENOUS at 09:23

## 2023-12-08 RX ADMIN — ACETAMINOPHEN 975 MG: 325 TABLET ORAL at 05:33

## 2023-12-08 RX ADMIN — OXYCODONE HYDROCHLORIDE 5 MG: 5 TABLET ORAL at 20:14

## 2023-12-08 RX ADMIN — FUROSEMIDE 40 MG: 10 INJECTION, SOLUTION INTRAMUSCULAR; INTRAVENOUS at 16:29

## 2023-12-08 RX ADMIN — SIMVASTATIN 80 MG: 40 TABLET, FILM COATED ORAL at 20:17

## 2023-12-08 RX ADMIN — POLYETHYLENE GLYCOL 3350 17 G: 17 POWDER, FOR SOLUTION ORAL at 08:11

## 2023-12-08 RX ADMIN — THERA TABS 1 TABLET: TAB at 08:11

## 2023-12-08 RX ADMIN — METOPROLOL TARTRATE 12.5 MG: 25 TABLET, FILM COATED ORAL at 20:16

## 2023-12-08 RX ADMIN — HEPARIN SODIUM 5000 UNITS: 5000 INJECTION, SOLUTION INTRAVENOUS; SUBCUTANEOUS at 21:11

## 2023-12-08 RX ADMIN — ACETAMINOPHEN 975 MG: 325 TABLET ORAL at 21:11

## 2023-12-08 RX ADMIN — METOPROLOL TARTRATE 12.5 MG: 25 TABLET, FILM COATED ORAL at 13:31

## 2023-12-08 RX ADMIN — OXYCODONE HYDROCHLORIDE 5 MG: 5 TABLET ORAL at 12:03

## 2023-12-08 RX ADMIN — HEPARIN SODIUM 5000 UNITS: 5000 INJECTION, SOLUTION INTRAVENOUS; SUBCUTANEOUS at 05:25

## 2023-12-08 RX ADMIN — LIDOCAINE 1 PATCH: 4 PATCH TOPICAL at 08:13

## 2023-12-08 RX ADMIN — HEPARIN SODIUM 5000 UNITS: 5000 INJECTION, SOLUTION INTRAVENOUS; SUBCUTANEOUS at 13:31

## 2023-12-08 ASSESSMENT — ACTIVITIES OF DAILY LIVING (ADL)
ADLS_ACUITY_SCORE: 24

## 2023-12-08 NOTE — PLAN OF CARE
Goal Outcome Evaluation:      Plan of Care Reviewed With: patient, spouse, family    Overall Patient Progress: improvingOverall Patient Progress: improving    Outcome Evaluation: pt downgraded from ICU to cardiac tele    [facility] - ICU    RN Progress Note:            Pertinent Assessments:      Please refer to flowsheet rows for full assessment     Pt OOB for every meal with assist of 1-2 for lines, but otherwise doing most of position changes on own.     Added Toradol IV this afternoon with good effect.     Nozin applied to bilateral nares.            Key Events - This Shift:     Downgraded to cardiac tele             Barriers to Discharge / Downgrade:     Chest tubes and atrial pacing wire in place         Point of Contact Update YES-OR-NO: Yes  Wife Chelsea here this afternoon and evening, updated with above

## 2023-12-08 NOTE — PLAN OF CARE
[facility] - ICU    RN Progress Note:            Pertinent Assessments:      Please refer to flowsheet rows for full assessment     No fever, denies pain, chest tubes in place with serosanguineous drainage.             Key Events - This Shift:       Uneventful                      Barriers to Discharge / Downgrade:     Chest tubes in place         Point of Contact Update No  If No, reason: Uneventful  Name:  Phone Number:  Summary of Conversation:       Goal Outcome Evaluation:

## 2023-12-08 NOTE — PROGRESS NOTES
Care Management Follow Up    Length of Stay (days): 2    Expected Discharge Date: 12/11/2023     Concerns to be Addressed: discharge planning     Patient plan of care discussed at interdisciplinary rounds: Yes    Anticipated Discharge Disposition: Home, Outpatient Rehab (PT, OT, SLP, Cardiac or Pulmonary)     Anticipated Discharge Services:  NA  Anticipated Discharge DME:  NA    Patient/family educated on Medicare website which has current facility and service quality ratings:  NA  Education Provided on the Discharge Plan: NA  Patient/Family in Agreement with the Plan:  NA    Referrals Placed by CM/SW:  NA  Private pay costs discussed: Not applicable    Additional Information:  Chart Reviewed.     Discharge planning - home with outpatient cardiac rehab.     Social HX: Pt lives in a house with wife Chelsea, reports he is independent with all I/ADL's at baseline.  CPAP at home.  No other DME, services reported.  Pt hopes to discharge home, family can transport.     CM will continue to follow care progression and aide in discharge planning as needed.     Ibis Jones RN

## 2023-12-08 NOTE — CONSULTS
NUTRITION EDUCATION      REASON:  Provider order for diet education after CV surgery    NUTRITION HISTORY:  Patient is s/p CABG.  Met patient and patient's spouse.  Patient eats a regular diet with a variety of foods.  Patient likes vegetables and eats a variety of vegetables.  Patient does not drink soda though he drinks 1-2 Beer/day.  Patient raises beef cattle    CURRENT DIET:  Consistent Carb, Low fat, low sodium diet    NUTRITION DIAGNOSIS:  Food- and nutrition-related knowledge deficit R/t heart disease.    INTERVENTIONS:    Nutrition Prescription:  Heart Healthy Diet    Implementation:      *  Nutrition Education (Content):   A)  Provided handout Heart Healthy Nutrition Therapy, label reading tips, Eat right with My plate.   B)  Discussed Building a meal plate with 1/2 plate vegetables.  We discussed carbohydrate foods and they affect blood sugar.       *  Nutrition Education (Application):   A)  Discussed current eating habits and recommended alternative food choices      *  Anticipate good compliance      *  Diet Education - refer to Education Flowsheet    Goals:      *  Patient participated in diet education.  Patient plans to change his evening snack to Berries or Nuts in place of Grapes.      *  All of the above goals met during the education session    Follow Up/Monitoring:      *  Provided RD contact information for future questions      *  Further education opportunities in cardiac rehab outpatient.

## 2023-12-08 NOTE — PROGRESS NOTES
"CVTS Daily Progress Note   POD#2 s/p CAB x 4  Attending: Dr Schwab  LOS: 2    SUBJECTIVE/INTERVAL EVENTS:    No acute events overnight. Patient progressing well. Maintaining oxygen saturations on 2L NC. Normotensive/hypotensive. Up to chair this am. Pain well controlled. -BM / flatus. Tolerating diet. UOP adequate. Chest tube output appropriate. Hgb stable. Patient denies new chest pain, shortness of breath, abdominal pain, calf pain, nausea. Patient has no questions today.     OBJECTIVE:  Temp:  [97.8  F (36.6  C)-98.8  F (37.1  C)] 98.8  F (37.1  C)  Pulse:  [83-99] 94  Resp:  [18-24] 18  BP: ()/(50-73) 93/56  MAP:  [73 mmHg-74 mmHg] 73 mmHg  Arterial Line BP: ()/(55) 95/55  SpO2:  [92 %-99 %] 93 %  Vitals:    12/06/23 0646 12/08/23 0530   Weight: 78.1 kg (172 lb 1.6 oz) 81.1 kg (178 lb 12.8 oz)       Clinically Significant Risk Factors          # Hypocalcemia: Lowest iCa = 1.05 mg/dL in last 2 days, will monitor and replace as appropriate   # Hypomagnesemia: Lowest Mg = 1.6 mg/dL in last 2 days, will replace as needed   # Hypoalbuminemia: Lowest albumin = 3.1 g/dL at 12/7/2023  3:58 AM, will monitor as appropriate    # Coagulation Defect: INR = 1.17 (Ref range: 0.85 - 1.15) and/or PTT = 43 Seconds (Ref range: 22 - 38 Seconds), will monitor for bleeding           # Overweight: Estimated body mass index is 29.75 kg/m  as calculated from the following:    Height as of 11/22/23: 1.651 m (5' 5\").    Weight as of this encounter: 81.1 kg (178 lb 12.8 oz)., PRESENT ON ADMISSION     # Financial/Environmental Concerns: none   # History of CABG: noted on surgical history      Current Medications:    Scheduled Meds:   acetaminophen  975 mg Oral Q8H    aspirin  162 mg Oral or NG Tube Daily    ezetimibe  10 mg Oral QPM    furosemide  40 mg Intravenous BID    heparin ANTICOAGULANT  5,000 Units Subcutaneous Q8H    insulin aspart  1-7 Units Subcutaneous TID AC    insulin aspart  1-5 Units Subcutaneous At Bedtime "    lidocaine  1-2 patch Transdermal Q24H    metoprolol tartrate  12.5 mg Oral BID    metoprolol tartrate  12.5 mg Oral TID    multivitamin, therapeutic  1 tablet Oral Daily    pantoprazole  40 mg Oral or NG Tube Daily    Or    pantoprazole  40 mg Oral Daily    polyethylene glycol  17 g Oral Daily    senna-docusate  1 tablet Oral BID    simvastatin  80 mg Oral At Bedtime    sodium phosphate  9 mmol Intravenous Once     Continuous Infusions:      PRN Meds:.[START ON 12/9/2023] acetaminophen, bisacodyl, calcium gluconate, calcium gluconate, calcium gluconate, glucose **OR** dextrose **OR** glucagon, glucose **OR** dextrose **OR** glucagon, hydrALAZINE, ketorolac, lactated ringers, magnesium hydroxide, naloxone **OR** naloxone **OR** naloxone **OR** naloxone, ondansetron **OR** ondansetron, oxyCODONE **OR** oxyCODONE, prochlorperazine **OR** prochlorperazine    Cardiographics:    Telemetry monitoring demonstrates NSR with rates in the 80-90 per my personal review.    Imaging:  Results for orders placed or performed during the hospital encounter of 12/06/23   XR Chest Port 1 View    Impression    IMPRESSION: Postsurgical changes from interval coronary artery bypass grafting. Endotracheal tube terminates approximately 3.9 cm above the nancy. Enteric decompression tube tip is in the lower esophagus and could be advanced at least 10 cm to reach the   stomach. Right neck central venous catheter tip in the upper SVC. Bilateral chest tubes and ascending mediastinal drain also present.    Low lung volumes with bibasilar atelectasis. No pleural effusion or pneumothorax.    Normal cardiomediastinal silhouette with median sternotomy wires and coronary surgical clips.   XR Chest Port 1 View    Impression    IMPRESSION: Poststernotomy and coronary artery bypass grafting. Mediastinal and bilateral pleural drains remain in position. Right internal jugular venous catheter tip in the SVC. Mild atelectasis in both lower lungs. No  visible pneumothorax. Stable mild   cardiomegaly. Normal pulmonary vascularity.       Labs, personally reviewed.  Hemoglobin   Date Value Ref Range Status   12/07/2023 10.0 (L) 13.3 - 17.7 g/dL Final   12/06/2023 11.5 (L) 13.3 - 17.7 g/dL Final   12/06/2023 11.1 (L) 13.3 - 17.7 g/dL Final     Hemoglobin POCT   Date Value Ref Range Status   12/06/2023 11.7 (L) 13.3 - 17.7 g/dL Final   12/06/2023 10.5 (L) 13.3 - 17.7 g/dL Final   12/06/2023 10.1 (L) 13.3 - 17.7 g/dL Final     WBC Count   Date Value Ref Range Status   12/07/2023 14.3 (H) 4.0 - 11.0 10e3/uL Final   12/06/2023 23.0 (H) 4.0 - 11.0 10e3/uL Final   12/06/2023 22.0 (H) 4.0 - 11.0 10e3/uL Final     Platelet Count   Date Value Ref Range Status   12/07/2023 140 (L) 150 - 450 10e3/uL Final   12/06/2023 142 (L) 150 - 450 10e3/uL Final   12/06/2023 161 150 - 450 10e3/uL Final     Creatinine   Date Value Ref Range Status   12/07/2023 1.05 0.67 - 1.17 mg/dL Final   12/06/2023 1.12 0.67 - 1.17 mg/dL Final   12/04/2023 1.20 (H) 0.67 - 1.17 mg/dL Final     Potassium   Date Value Ref Range Status   12/08/2023 4.0 3.4 - 5.3 mmol/L Final   12/07/2023 4.0 3.4 - 5.3 mmol/L Final   12/06/2023 4.1 3.4 - 5.3 mmol/L Final   12/06/2023 4.1 3.4 - 5.3 mmol/L Final   11/17/2021 4.3 3.5 - 5.0 mmol/L Final   10/26/2020 4.9 3.5 - 5.0 mmol/L Final   09/25/2019 4.1 3.5 - 5.0 mmol/L Final     Potassium POCT   Date Value Ref Range Status   12/06/2023 4.5 3.5 - 5.0 mmol/L Final   12/06/2023 5.0 3.5 - 5.0 mmol/L Final   12/06/2023 4.8 3.5 - 5.0 mmol/L Final     Magnesium   Date Value Ref Range Status   12/08/2023 2.1 1.7 - 2.3 mg/dL Final   12/07/2023 1.6 (L) 1.7 - 2.3 mg/dL Final   12/06/2023 2.8 (H) 1.7 - 2.3 mg/dL Final          I/O:  I/O last 3 completed shifts:  In: 1697.03 [P.O.:1200; I.V.:497.03]  Out: 3412 [Urine:2525; Chest Tube:887]       Physical Exam:    General: Patient seen up in chair. NAD. Conversant. Pleasant.   HEENT: ANIBAL, no sclera icterus, moist mucosa  CV: RRR on  monitor. 2+ peripheral pulses in all extremities. Mild edema.   Pulm: Non-labored effort on 2L NC. Chest tubes in place, serosanguinous output, no air leak.  Incision C/D/I.  Abd: Soft, NT, ND  : Boggs with rubén urine  Ext: Mild pedal edema, SCDs in place, warm, distal pulses intact  Neuro: CNs grossly intact.       ASSESSMENT/PLAN:    Royce Feliz is a 73 year old male with a history of CAD who is s/p CAB x 4.    Principal Problem:    Coronary artery disease due to calcified coronary lesion        NEURO:   - Scheduled Tylenol/lidocaine patches and PRN Tylenol/oxycodone/dilaudid for pain    CV:   - Pre-op EF 60-65%  - Normotensive/hypotensive  - Metoprolol 12.5 mg two times a day with parameters and extra doses available prn  - ASA 162mg  - Simvastatin 80mg daily  - TPW's and mediastinal chest tube removed without immediate complications. Bilat pleural chest tubes in place.    PULM:   - Maintaining oxygen saturations on 2L NC  - Encourage pulmonary toilet    FEN/GI:  - Continue electrolyte replacement protocol  - Diet: Cardiac, ADAT   - Bowel regimen    RENAL:  - Adequate UOP/hr. Continue to monitor closely.  - Cr 1.05  - Boggs to remain in for close monitoring of I/O and during period of diuresis/relative immobility.   - Diuresis with 40mg IV Lasix two times a day     HEME:  - Acute blood loss anemia post-op.   - Hgb stable, no bleeding concerns. Hep SQ, ASA  - Plavix before discharge and after chest tubes removed for 1 year per surgeon preference(decrease ASA)    ID:  - [place] op ppx complete, afebrile. No concerns for infection    ENDO:    - Transition to SSI    PPx:   - DVT: SCDs, SQ heparin TID, ambulation   - GI: Protonix 40mg PO daily    DISPO:   - Transfer to general telemetry status      Patient discussed with Dr. Josselin Myers PA-C  [facility]  Vocera or pager [phone]

## 2023-12-08 NOTE — PROGRESS NOTES
RCAT Treatment Plan    Patient Score: 9  Patient Acuity: 4    Clinical Indication for Therapy: productive cough and prevent atelectasis    Therapy Ordered: IS and Aerobika TID    Assessment Summary: Pt on 2L NC, BS are clear, RR 12-20, FS=4589 and FV completed with good effort. RT to re-eval in 72 hours per RT protocol.     Kathy Rodas, RT  12/8/2023

## 2023-12-08 NOTE — PLAN OF CARE
Problem: Fall Injury Risk  Goal: Absence of Fall and Fall-Related Injury  Outcome: Progressing  Intervention: Identify and Manage Contributors  Recent Flowsheet Documentation  Taken 12/8/2023 1210 by Sourav Reveles RN  Medication Review/Management: medications reviewed  Taken 12/8/2023 0800 by Sourav Reveles RN  Medication Review/Management: medications reviewed  Intervention: Promote Injury-Free Environment  Recent Flowsheet Documentation  Taken 12/8/2023 1210 by Sourav Reveles RN  Safety Promotion/Fall Prevention: activity supervised  Taken 12/8/2023 0800 by Sourav Reveles RN  Safety Promotion/Fall Prevention: activity supervised     Problem: Pain Acute  Goal: Optimal Pain Control and Function  Outcome: Progressing  Intervention: Develop Pain Management Plan  Recent Flowsheet Documentation  Taken 12/8/2023 1203 by Sourav Reveles RN  Pain Management Interventions: medication (see MAR)  Intervention: Prevent or Manage Pain  Recent Flowsheet Documentation  Taken 12/8/2023 1210 by Sourav Reveles RN  Medication Review/Management: medications reviewed  Taken 12/8/2023 0800 by Sourav Reveles RN  Medication Review/Management: medications reviewed     Problem: Infection  Goal: Absence of Infection Signs and Symptoms  Outcome: Progressing   Goal Outcome Evaluation:               Pt is alert and oriented x4. Pt is med complaint. Pt received pain med for chest aching. Ct site is dry and intact. Pt is up on the chair. Boggs is patent. Pt was up x1 this morning. Continue to monitor pt.

## 2023-12-09 ENCOUNTER — APPOINTMENT (OUTPATIENT)
Dept: OCCUPATIONAL THERAPY | Facility: HOSPITAL | Age: 73
DRG: 236 | End: 2023-12-09
Attending: SURGERY
Payer: COMMERCIAL

## 2023-12-09 ENCOUNTER — APPOINTMENT (OUTPATIENT)
Dept: RADIOLOGY | Facility: HOSPITAL | Age: 73
DRG: 236 | End: 2023-12-09
Attending: PHYSICIAN ASSISTANT
Payer: COMMERCIAL

## 2023-12-09 LAB
CALCIUM, IONIZED MEASURED: 1.1 MMOL/L (ref 1.11–1.3)
CALCIUM, IONIZED MEASURED: 1.15 MMOL/L (ref 1.11–1.3)
GLUCOSE BLDC GLUCOMTR-MCNC: 121 MG/DL (ref 70–99)
GLUCOSE BLDC GLUCOMTR-MCNC: 130 MG/DL (ref 70–99)
GLUCOSE BLDC GLUCOMTR-MCNC: 151 MG/DL (ref 70–99)
ION CA PH 7.4: 1.14 MMOL/L (ref 1.11–1.3)
ION CA PH 7.4: 1.2 MMOL/L (ref 1.11–1.3)
MAGNESIUM SERPL-MCNC: 2.1 MG/DL (ref 1.7–2.3)
PH: 7.48 (ref 7.35–7.45)
PH: 7.48 (ref 7.35–7.45)
PHOSPHATE SERPL-MCNC: 2.5 MG/DL (ref 2.5–4.5)
POTASSIUM SERPL-SCNC: 3.5 MMOL/L (ref 3.4–5.3)

## 2023-12-09 PROCEDURE — 82330 ASSAY OF CALCIUM: CPT | Performed by: PHYSICIAN ASSISTANT

## 2023-12-09 PROCEDURE — 71046 X-RAY EXAM CHEST 2 VIEWS: CPT

## 2023-12-09 PROCEDURE — 999N000157 HC STATISTIC RCP TIME EA 10 MIN

## 2023-12-09 PROCEDURE — 250N000013 HC RX MED GY IP 250 OP 250 PS 637: Performed by: PHYSICIAN ASSISTANT

## 2023-12-09 PROCEDURE — 84132 ASSAY OF SERUM POTASSIUM: CPT | Performed by: SURGERY

## 2023-12-09 PROCEDURE — 250N000011 HC RX IP 250 OP 636: Mod: JZ | Performed by: PHYSICIAN ASSISTANT

## 2023-12-09 PROCEDURE — 999N000156 HC STATISTIC RCP CONSULT EA 30 MIN

## 2023-12-09 PROCEDURE — 82330 ASSAY OF CALCIUM: CPT | Performed by: SURGERY

## 2023-12-09 PROCEDURE — 250N000013 HC RX MED GY IP 250 OP 250 PS 637: Performed by: SURGERY

## 2023-12-09 PROCEDURE — 250N000011 HC RX IP 250 OP 636: Performed by: PHYSICIAN ASSISTANT

## 2023-12-09 PROCEDURE — 97110 THERAPEUTIC EXERCISES: CPT | Mod: GO

## 2023-12-09 PROCEDURE — 94799 UNLISTED PULMONARY SVC/PX: CPT

## 2023-12-09 PROCEDURE — 36415 COLL VENOUS BLD VENIPUNCTURE: CPT | Performed by: SURGERY

## 2023-12-09 PROCEDURE — 84100 ASSAY OF PHOSPHORUS: CPT | Performed by: SURGERY

## 2023-12-09 PROCEDURE — 210N000001 HC R&B IMCU HEART CARE

## 2023-12-09 PROCEDURE — 83735 ASSAY OF MAGNESIUM: CPT | Performed by: SURGERY

## 2023-12-09 RX ORDER — FUROSEMIDE 10 MG/ML
40 INJECTION INTRAMUSCULAR; INTRAVENOUS DAILY
Status: DISCONTINUED | OUTPATIENT
Start: 2023-12-09 | End: 2023-12-09

## 2023-12-09 RX ORDER — METOPROLOL TARTRATE 25 MG/1
25 TABLET, FILM COATED ORAL 2 TIMES DAILY
Status: DISCONTINUED | OUTPATIENT
Start: 2023-12-09 | End: 2023-12-11

## 2023-12-09 RX ORDER — POTASSIUM CHLORIDE 1500 MG/1
20 TABLET, EXTENDED RELEASE ORAL ONCE
Status: COMPLETED | OUTPATIENT
Start: 2023-12-09 | End: 2023-12-09

## 2023-12-09 RX ORDER — FUROSEMIDE 10 MG/ML
20 INJECTION INTRAMUSCULAR; INTRAVENOUS DAILY
Status: DISCONTINUED | OUTPATIENT
Start: 2023-12-10 | End: 2023-12-10

## 2023-12-09 RX ADMIN — ACETAMINOPHEN 975 MG: 325 TABLET ORAL at 13:00

## 2023-12-09 RX ADMIN — METOPROLOL TARTRATE 25 MG: 25 TABLET, FILM COATED ORAL at 20:26

## 2023-12-09 RX ADMIN — CALCIUM GLUCONATE 1 G: 20 INJECTION, SOLUTION INTRAVENOUS at 07:09

## 2023-12-09 RX ADMIN — METOPROLOL TARTRATE 12.5 MG: 25 TABLET, FILM COATED ORAL at 12:58

## 2023-12-09 RX ADMIN — ACETAMINOPHEN 975 MG: 325 TABLET ORAL at 06:44

## 2023-12-09 RX ADMIN — OXYCODONE HYDROCHLORIDE 5 MG: 5 TABLET ORAL at 16:05

## 2023-12-09 RX ADMIN — POLYETHYLENE GLYCOL 3350 17 G: 17 POWDER, FOR SOLUTION ORAL at 08:12

## 2023-12-09 RX ADMIN — POTASSIUM & SODIUM PHOSPHATES POWDER PACK 280-160-250 MG 1 PACKET: 280-160-250 PACK at 12:52

## 2023-12-09 RX ADMIN — METOPROLOL TARTRATE 12.5 MG: 25 TABLET, FILM COATED ORAL at 03:26

## 2023-12-09 RX ADMIN — LIDOCAINE 2 PATCH: 4 PATCH TOPICAL at 08:12

## 2023-12-09 RX ADMIN — HEPARIN SODIUM 5000 UNITS: 5000 INJECTION, SOLUTION INTRAVENOUS; SUBCUTANEOUS at 14:08

## 2023-12-09 RX ADMIN — THERA TABS 1 TABLET: TAB at 08:11

## 2023-12-09 RX ADMIN — SENNOSIDES AND DOCUSATE SODIUM 1 TABLET: 8.6; 5 TABLET ORAL at 08:11

## 2023-12-09 RX ADMIN — ASPIRIN 162 MG: 81 TABLET, CHEWABLE ORAL at 08:11

## 2023-12-09 RX ADMIN — METOPROLOL TARTRATE 12.5 MG: 25 TABLET, FILM COATED ORAL at 17:28

## 2023-12-09 RX ADMIN — PANTOPRAZOLE SODIUM 40 MG: 40 TABLET, DELAYED RELEASE ORAL at 08:11

## 2023-12-09 RX ADMIN — POTASSIUM & SODIUM PHOSPHATES POWDER PACK 280-160-250 MG 1 PACKET: 280-160-250 PACK at 08:19

## 2023-12-09 RX ADMIN — HEPARIN SODIUM 5000 UNITS: 5000 INJECTION, SOLUTION INTRAVENOUS; SUBCUTANEOUS at 21:22

## 2023-12-09 RX ADMIN — SIMVASTATIN 80 MG: 40 TABLET, FILM COATED ORAL at 20:30

## 2023-12-09 RX ADMIN — SENNOSIDES AND DOCUSATE SODIUM 1 TABLET: 8.6; 5 TABLET ORAL at 20:26

## 2023-12-09 RX ADMIN — POTASSIUM CHLORIDE 20 MEQ: 1500 TABLET, EXTENDED RELEASE ORAL at 08:10

## 2023-12-09 RX ADMIN — KETOROLAC TROMETHAMINE 15 MG: 15 INJECTION, SOLUTION INTRAMUSCULAR; INTRAVENOUS at 03:38

## 2023-12-09 RX ADMIN — FUROSEMIDE 20 MG: 10 INJECTION, SOLUTION INTRAMUSCULAR; INTRAVENOUS at 08:19

## 2023-12-09 RX ADMIN — HEPARIN SODIUM 5000 UNITS: 5000 INJECTION, SOLUTION INTRAVENOUS; SUBCUTANEOUS at 06:47

## 2023-12-09 RX ADMIN — OXYCODONE HYDROCHLORIDE 5 MG: 5 TABLET ORAL at 03:38

## 2023-12-09 RX ADMIN — EZETIMIBE 10 MG: 10 TABLET ORAL at 20:26

## 2023-12-09 ASSESSMENT — ACTIVITIES OF DAILY LIVING (ADL)
ADLS_ACUITY_SCORE: 24

## 2023-12-09 NOTE — PROGRESS NOTES
"CVTS Daily Progress Note   POD#3 s/p CAB x 4  Attending: Dr Schwab  LOS: 3    SUBJECTIVE/INTERVAL EVENTS:    No acute events overnight. Transferred to telemetry unit this am. Patient progressing well. Maintaining oxygen saturations on RA. Normotensive/hypotensive. Up to chair this am. Pain well controlled. -BM /+ flatus. Tolerating diet. UOP adequate. Chest tube output appropriate. Hgb stable. Patient denies new chest pain, shortness of breath, abdominal pain, calf pain, nausea. Patient has no questions today.     OBJECTIVE:  Temp:  [97.7  F (36.5  C)-99.1  F (37.3  C)] 97.7  F (36.5  C)  Pulse:  [] 90  Resp:  [18-20] 18  BP: ()/(53-70) 92/53  SpO2:  [91 %-95 %] 91 %  Vitals:    12/06/23 0646 12/08/23 0530 12/09/23 0413   Weight: 78.1 kg (172 lb 1.6 oz) 81.1 kg (178 lb 12.8 oz) 77.5 kg (170 lb 12.8 oz)       Clinically Significant Risk Factors              # Hypoalbuminemia: Lowest albumin = 3.1 g/dL at 12/7/2023  3:58 AM, will monitor as appropriate              # Overweight: Estimated body mass index is 28.42 kg/m  as calculated from the following:    Height as of 11/22/23: 1.651 m (5' 5\").    Weight as of this encounter: 77.5 kg (170 lb 12.8 oz)., PRESENT ON ADMISSION     # Financial/Environmental Concerns: none   # History of CABG: noted on surgical history      Current Medications:    Scheduled Meds:   acetaminophen  975 mg Oral Q8H    aspirin  162 mg Oral or NG Tube Daily    ezetimibe  10 mg Oral QPM    [START ON 12/10/2023] furosemide  20 mg Intravenous Daily    heparin ANTICOAGULANT  5,000 Units Subcutaneous Q8H    insulin aspart  1-7 Units Subcutaneous TID AC    insulin aspart  1-5 Units Subcutaneous At Bedtime    lidocaine  1-2 patch Transdermal Q24H    metoprolol tartrate  12.5 mg Oral TID    metoprolol tartrate  25 mg Oral BID    multivitamin, therapeutic  1 tablet Oral Daily    pantoprazole  40 mg Oral or NG Tube Daily    Or    pantoprazole  40 mg Oral Daily    polyethylene glycol  17 g " Oral Daily    potassium & sodium phosphates  1 packet Oral or Feeding Tube Q4H    senna-docusate  1 tablet Oral BID    simvastatin  80 mg Oral At Bedtime     Continuous Infusions:      PRN Meds:.acetaminophen, bisacodyl, calcium gluconate, calcium gluconate, calcium gluconate, glucose **OR** dextrose **OR** glucagon, glucose **OR** dextrose **OR** glucagon, hydrALAZINE, ketorolac, lactated ringers, magnesium hydroxide, naloxone **OR** naloxone **OR** naloxone **OR** naloxone, ondansetron **OR** ondansetron, oxyCODONE **OR** oxyCODONE, prochlorperazine **OR** prochlorperazine    Cardiographics:    Telemetry monitoring demonstrates NSR with rates in the 80-90 per my personal review.    Imaging:  Results for orders placed or performed during the hospital encounter of 12/06/23   XR Chest Port 1 View    Impression    IMPRESSION: Postsurgical changes from interval coronary artery bypass grafting. Endotracheal tube terminates approximately 3.9 cm above the nancy. Enteric decompression tube tip is in the lower esophagus and could be advanced at least 10 cm to reach the   stomach. Right neck central venous catheter tip in the upper SVC. Bilateral chest tubes and ascending mediastinal drain also present.    Low lung volumes with bibasilar atelectasis. No pleural effusion or pneumothorax.    Normal cardiomediastinal silhouette with median sternotomy wires and coronary surgical clips.   XR Chest Port 1 View    Impression    IMPRESSION: Poststernotomy and coronary artery bypass grafting. Mediastinal and bilateral pleural drains remain in position. Right internal jugular venous catheter tip in the SVC. Mild atelectasis in both lower lungs. No visible pneumothorax. Stable mild   cardiomegaly. Normal pulmonary vascularity.       Labs, personally reviewed.  Hemoglobin   Date Value Ref Range Status   12/07/2023 10.0 (L) 13.3 - 17.7 g/dL Final   12/06/2023 11.5 (L) 13.3 - 17.7 g/dL Final   12/06/2023 11.1 (L) 13.3 - 17.7 g/dL Final      Hemoglobin POCT   Date Value Ref Range Status   12/06/2023 11.7 (L) 13.3 - 17.7 g/dL Final   12/06/2023 10.5 (L) 13.3 - 17.7 g/dL Final   12/06/2023 10.1 (L) 13.3 - 17.7 g/dL Final     WBC Count   Date Value Ref Range Status   12/07/2023 14.3 (H) 4.0 - 11.0 10e3/uL Final   12/06/2023 23.0 (H) 4.0 - 11.0 10e3/uL Final   12/06/2023 22.0 (H) 4.0 - 11.0 10e3/uL Final     Platelet Count   Date Value Ref Range Status   12/07/2023 140 (L) 150 - 450 10e3/uL Final   12/06/2023 142 (L) 150 - 450 10e3/uL Final   12/06/2023 161 150 - 450 10e3/uL Final     Creatinine   Date Value Ref Range Status   12/07/2023 1.05 0.67 - 1.17 mg/dL Final   12/06/2023 1.12 0.67 - 1.17 mg/dL Final   12/04/2023 1.20 (H) 0.67 - 1.17 mg/dL Final     Potassium   Date Value Ref Range Status   12/09/2023 3.5 3.4 - 5.3 mmol/L Final   12/08/2023 4.0 3.4 - 5.3 mmol/L Final   12/07/2023 4.0 3.4 - 5.3 mmol/L Final   11/17/2021 4.3 3.5 - 5.0 mmol/L Final   10/26/2020 4.9 3.5 - 5.0 mmol/L Final   09/25/2019 4.1 3.5 - 5.0 mmol/L Final     Potassium POCT   Date Value Ref Range Status   12/06/2023 4.5 3.5 - 5.0 mmol/L Final   12/06/2023 5.0 3.5 - 5.0 mmol/L Final   12/06/2023 4.8 3.5 - 5.0 mmol/L Final     Magnesium   Date Value Ref Range Status   12/09/2023 2.1 1.7 - 2.3 mg/dL Final   12/08/2023 2.1 1.7 - 2.3 mg/dL Final   12/07/2023 1.6 (L) 1.7 - 2.3 mg/dL Final          I/O:  I/O last 3 completed shifts:  In: 400 [P.O.:400]  Out: 3760 [Urine:3400; Chest Tube:360]       Physical Exam:    General: Patient seen up in chair. NAD. Conversant. Pleasant.   HEENT: ANIBAL, no sclera icterus, moist mucosa  CV: RRR on monitor. 2+ peripheral pulses in all extremities. Mild edema.   Pulm: Non-labored effort on RA. Chest tubes in place, serosanguinous output, no air leak.  Incision C/D/I.  Abd: Soft, NT, ND  : Voiding  Ext: Mild pedal edema, SCDs in place, warm, distal pulses intact  Neuro: CNs grossly intact.       ASSESSMENT/PLAN:    Royce Feliz is a 73 year  old male with a history of CAD who is s/p CAB x 4.    Principal Problem:    Coronary artery disease due to calcified coronary lesion        NEURO:   - Scheduled Tylenol/lidocaine patches and PRN Tylenol/oxycodone/dilaudid for pain    CV:   - Pre-op EF 60-65%  - Normotensive  - Metoprolol 25 mg two times a day with parameters and extra doses available prn  - ASA 162mg  - Simvastatin 80mg daily  - TPW's and mediastinal chest tube removed without immediate complications 12/8. Bilat pleural chest tubes removed without immediate complications 12/9    PULM:   - Maintaining oxygen saturations on RA  - Encourage pulmonary toilet    FEN/GI:  - Continue electrolyte replacement protocol  - Diet: Cardiac, ADAT   - Bowel regimen    RENAL:  - Adequate UOP/hr. Continue to monitor closely.  - Cr 1.05  - Boggs removed POD#2  - Diuresis with 40mg IV Lasix two times a day-decreased to Lasix 20 mg daily dt hypotension     HEME:  - Acute blood loss anemia post-op.   - Hgb stable, no bleeding concerns. Hep SQ, ASA  - Plavix before discharge and after chest tubes removed for 1 year per surgeon preference(decrease ASA)    ID:  - Ashanti op ppx complete, afebrile. No concerns for infection    ENDO:    - Transition to SSI    PPx:   - DVT: SCDs, SQ heparin TID, ambulation   - GI: Protonix 40mg PO daily    DISPO:   - Continue general telemetry status    Patient discussed with Dr. Mulvihill Gina McCrone,PA-C  [facility]  [employer]ocera or pager [phone]

## 2023-12-09 NOTE — PLAN OF CARE
[facility] - ICU    RN Progress Note:            Pertinent Assessments:      Please refer to flowsheet rows for full assessment     Alert and oriented x 4, make needs known, stable VS, 2 chest tube site secured draining to 1 atrium. O2 at 1-2L/ NC, NSR with ST elevation V2           Key Events - This Shift:   Patient complained of chest tube site pain, 3/10 pain rate, medicated with oxycodone 5 mg x 1 and toradol 15 mg IV x1 dose.            Barriers to Discharge / Downgrade:   Transferred patient to room 304 via wheelchair at 0400, report given to Dalia RN via phone.

## 2023-12-09 NOTE — TREATMENT PLAN
/58   Pulse 98   Temp 98  F (36.7  C) (Oral)   Resp 18   Wt 77.5 kg (170 lb 12.8 oz)   SpO2 91%   BMI 28.42 kg/m       Pt to use IS and [employer] on own. Pt able to use and has no questions.

## 2023-12-09 NOTE — PLAN OF CARE
sc  Patient Name: Royce Feliz   MRN: [id number]   Date of Admission: 2023    Procedure: Procedure(s):  CORONARY ARTERY BYPASS GRAFT TIMES FOUR , LEFT INTERNAL MAMMARY ARTERY HARVEST, LEFT SAPHENOUS ENDOSCOPIC VESSEL PROCUREMENT,TRANSESOPHAGEAL ECHOCARDIOGRAM , EPI AORTIC ULTRASOUND  ANESTHESIA TRANSESOPHAGEAL ECHOCARDIOGRAM    Post Op day #: 3    Subjective (Patient focus/Primary Problem for shift): Pain control          Pain Goal  0 Pain Ratin          Pain Medication/ Regime effective to reduce patient pain: Yes    Objective (Physical assessment):           Rhythm: normal sinus rhythm            Bowel Activity: no if Yes indicate when: none          Bowel Medications: yes            Incision: healing well          Incentive Spirometry Q 1-2 hour when awake:  yes Volume: 750 ml          Epicardial Pacing Wires:  no            Patient Activity:           Up to chair for meals: yes          Ambulation with RN x2 (Not including CR): not applicable            Is patient in home clothes:no             Chest Tubes   Pleural: yes Draining: yes               Suction: yes              Mediastinal: yes Draining: yes               Suction: yes   Dressing Change Daily:yes If No, why? 23                     Urinary Catheter: no           Preventative WOC consult (need MD order): no       Assessment (Nursing primary shift focus): Transferred out of ICU today. Got pain meds before transfer. Rating pain down to 1 to almost gone per patient. Has 2 chest tubes, draining into 1 atrium. Had 90 ml out for tonight.     Plan (Patient Care Plan/focus): Up to chair before breakfast. Encourage IS when awake. Ambulate during the day.       Dalia Taveras RN   2023   4:38 AM

## 2023-12-10 ENCOUNTER — APPOINTMENT (OUTPATIENT)
Dept: OCCUPATIONAL THERAPY | Facility: HOSPITAL | Age: 73
DRG: 236 | End: 2023-12-10
Attending: SURGERY
Payer: COMMERCIAL

## 2023-12-10 LAB
CALCIUM, IONIZED MEASURED: 1.12 MMOL/L (ref 1.11–1.3)
GLUCOSE BLDC GLUCOMTR-MCNC: 105 MG/DL (ref 70–99)
GLUCOSE BLDC GLUCOMTR-MCNC: 109 MG/DL (ref 70–99)
GLUCOSE BLDC GLUCOMTR-MCNC: 125 MG/DL (ref 70–99)
GLUCOSE BLDC GLUCOMTR-MCNC: 96 MG/DL (ref 70–99)
ION CA PH 7.4: 1.15 MMOL/L (ref 1.11–1.3)
MAGNESIUM SERPL-MCNC: 2 MG/DL (ref 1.7–2.3)
PH: 7.45 (ref 7.35–7.45)
PHOSPHATE SERPL-MCNC: 2.9 MG/DL (ref 2.5–4.5)
PLATELET # BLD AUTO: 171 10E3/UL (ref 150–450)
POTASSIUM SERPL-SCNC: 3.9 MMOL/L (ref 3.4–5.3)

## 2023-12-10 PROCEDURE — 250N000013 HC RX MED GY IP 250 OP 250 PS 637: Performed by: PHYSICIAN ASSISTANT

## 2023-12-10 PROCEDURE — 250N000011 HC RX IP 250 OP 636: Mod: JZ | Performed by: PHYSICIAN ASSISTANT

## 2023-12-10 PROCEDURE — 84100 ASSAY OF PHOSPHORUS: CPT | Performed by: SURGERY

## 2023-12-10 PROCEDURE — 210N000001 HC R&B IMCU HEART CARE

## 2023-12-10 PROCEDURE — 85049 AUTOMATED PLATELET COUNT: CPT | Performed by: PHYSICIAN ASSISTANT

## 2023-12-10 PROCEDURE — 250N000013 HC RX MED GY IP 250 OP 250 PS 637: Performed by: SURGERY

## 2023-12-10 PROCEDURE — 82330 ASSAY OF CALCIUM: CPT | Performed by: SURGERY

## 2023-12-10 PROCEDURE — 84132 ASSAY OF SERUM POTASSIUM: CPT | Performed by: SURGERY

## 2023-12-10 PROCEDURE — 83735 ASSAY OF MAGNESIUM: CPT | Performed by: SURGERY

## 2023-12-10 PROCEDURE — 97110 THERAPEUTIC EXERCISES: CPT | Mod: GO

## 2023-12-10 PROCEDURE — 97535 SELF CARE MNGMENT TRAINING: CPT | Mod: GO

## 2023-12-10 PROCEDURE — 36415 COLL VENOUS BLD VENIPUNCTURE: CPT | Performed by: PHYSICIAN ASSISTANT

## 2023-12-10 RX ORDER — CLOPIDOGREL BISULFATE 75 MG/1
75 TABLET ORAL DAILY
Status: DISCONTINUED | OUTPATIENT
Start: 2023-12-10 | End: 2023-12-11 | Stop reason: HOSPADM

## 2023-12-10 RX ORDER — MAGNESIUM OXIDE 400 MG/1
400 TABLET ORAL EVERY 4 HOURS
Status: COMPLETED | OUTPATIENT
Start: 2023-12-10 | End: 2023-12-10

## 2023-12-10 RX ORDER — ASPIRIN 81 MG/1
81 TABLET, CHEWABLE ORAL DAILY
Status: DISCONTINUED | OUTPATIENT
Start: 2023-12-10 | End: 2023-12-11 | Stop reason: HOSPADM

## 2023-12-10 RX ORDER — FUROSEMIDE 10 MG/ML
40 INJECTION INTRAMUSCULAR; INTRAVENOUS
Status: DISCONTINUED | OUTPATIENT
Start: 2023-12-10 | End: 2023-12-11

## 2023-12-10 RX ADMIN — HEPARIN SODIUM 5000 UNITS: 5000 INJECTION, SOLUTION INTRAVENOUS; SUBCUTANEOUS at 06:50

## 2023-12-10 RX ADMIN — HEPARIN SODIUM 5000 UNITS: 5000 INJECTION, SOLUTION INTRAVENOUS; SUBCUTANEOUS at 21:51

## 2023-12-10 RX ADMIN — FUROSEMIDE 40 MG: 10 INJECTION, SOLUTION INTRAMUSCULAR; INTRAVENOUS at 17:57

## 2023-12-10 RX ADMIN — ASPIRIN 81 MG: 81 TABLET, CHEWABLE ORAL at 08:00

## 2023-12-10 RX ADMIN — PANTOPRAZOLE SODIUM 40 MG: 40 TABLET, DELAYED RELEASE ORAL at 08:01

## 2023-12-10 RX ADMIN — METOPROLOL TARTRATE 25 MG: 25 TABLET, FILM COATED ORAL at 08:01

## 2023-12-10 RX ADMIN — HEPARIN SODIUM 5000 UNITS: 5000 INJECTION, SOLUTION INTRAVENOUS; SUBCUTANEOUS at 14:42

## 2023-12-10 RX ADMIN — SENNOSIDES AND DOCUSATE SODIUM 1 TABLET: 8.6; 5 TABLET ORAL at 21:50

## 2023-12-10 RX ADMIN — METOPROLOL TARTRATE 12.5 MG: 25 TABLET, FILM COATED ORAL at 12:14

## 2023-12-10 RX ADMIN — METOPROLOL TARTRATE 25 MG: 25 TABLET, FILM COATED ORAL at 21:50

## 2023-12-10 RX ADMIN — CLOPIDOGREL BISULFATE 75 MG: 75 TABLET ORAL at 08:00

## 2023-12-10 RX ADMIN — THERA TABS 1 TABLET: TAB at 08:00

## 2023-12-10 RX ADMIN — POLYETHYLENE GLYCOL 3350 17 G: 17 POWDER, FOR SOLUTION ORAL at 08:01

## 2023-12-10 RX ADMIN — FUROSEMIDE 40 MG: 10 INJECTION, SOLUTION INTRAMUSCULAR; INTRAVENOUS at 08:00

## 2023-12-10 RX ADMIN — SENNOSIDES AND DOCUSATE SODIUM 1 TABLET: 8.6; 5 TABLET ORAL at 08:00

## 2023-12-10 RX ADMIN — METOPROLOL TARTRATE 12.5 MG: 25 TABLET, FILM COATED ORAL at 17:57

## 2023-12-10 RX ADMIN — ACETAMINOPHEN 650 MG: 325 TABLET ORAL at 16:37

## 2023-12-10 RX ADMIN — SIMVASTATIN 80 MG: 40 TABLET, FILM COATED ORAL at 21:54

## 2023-12-10 RX ADMIN — EZETIMIBE 10 MG: 10 TABLET ORAL at 21:50

## 2023-12-10 RX ADMIN — METOPROLOL TARTRATE 12.5 MG: 25 TABLET, FILM COATED ORAL at 04:04

## 2023-12-10 RX ADMIN — ACETAMINOPHEN 650 MG: 325 TABLET ORAL at 07:55

## 2023-12-10 RX ADMIN — MAGNESIUM OXIDE TAB 400 MG (241.3 MG ELEMENTAL MG) 400 MG: 400 (241.3 MG) TAB at 07:41

## 2023-12-10 RX ADMIN — MAGNESIUM OXIDE TAB 400 MG (241.3 MG ELEMENTAL MG) 400 MG: 400 (241.3 MG) TAB at 12:14

## 2023-12-10 ASSESSMENT — ACTIVITIES OF DAILY LIVING (ADL)
ADLS_ACUITY_SCORE: 25
ADLS_ACUITY_SCORE: 25
ADLS_ACUITY_SCORE: 24
ADLS_ACUITY_SCORE: 25
ADLS_ACUITY_SCORE: 25
ADLS_ACUITY_SCORE: 24
ADLS_ACUITY_SCORE: 25
ADLS_ACUITY_SCORE: 25

## 2023-12-10 NOTE — CARE PLAN
.sc  Patient Name: Royce Feliz   MRN: [id number]   Date of Admission: 12/6/2023    Procedure: Procedure(s):  CORONARY ARTERY BYPASS GRAFT TIMES FOUR , LEFT INTERNAL MAMMARY ARTERY HARVEST, LEFT SAPHENOUS ENDOSCOPIC VESSEL PROCUREMENT,TRANSESOPHAGEAL ECHOCARDIOGRAM , EPI AORTIC ULTRASOUND  ANESTHESIA TRANSESOPHAGEAL ECHOCARDIOGRAM    Post Op day #:3    Subjective (Patient focus/Primary Problem for shift): Increasing endurance. Walk more          Pain Goal3 Pain Rating3           Pain Medication/ Regime effective to reduce patient pain Tylenol Oxycodone 5mg.     Objective (Physical assessment):           Rhythm: normal sinus rhythm            Bowel Activity: no if Yes indicate when: NA          Bowel Medications: yes            Incision: covered          Incentive Spirometry Q 1-2 hour when awake:  yes Volume: 1250          Epicardial Pacing Wires:  no            Patient Activity:           Up to chair for meals: yes          Ambulation with RN x2 (Not including CR): up x1 with Rn,            Is patient in home clothes:no             Chest Tubes   Pleural: not applicable Draining: not applicable               Suction: not applicable              Mediastinal: not applicable Draining: not applicable               Suction: not applicable   Dressing Change Daily:yes If No, why?Drain pulled today shower tomorrow                     Urinary Catheter: no           Preventative WOC consult (need MD order): no       Assessment (Nursing primary shift focus): Increase activity as tolerated. Milk or mag and possible suppository tomorrow if no BM. Pt remains SR with a few PVCs. Off 02 this am till after nap this afternoon. Patient awoke and SATs were 85% on RA. Patient typically wears mouth device for EDMAR but forgot to put on. Encouraged to use IS and Futter valve. LINDA Tan updated    Plan (Patient Care Plan/focus): Continue to monitor O2 need. Patient to sit more upright when sleeping. Bowel meds.       Tamra POLANCO  BERYL Mitchell   12/9/2023   6:26 PM

## 2023-12-10 NOTE — PLAN OF CARE
sc  Patient Name: Royce Feliz   MRN: [id number]   Date of Admission: 12/6/2023    Procedure: Procedure(s):  CORONARY ARTERY BYPASS GRAFT TIMES FOUR , LEFT INTERNAL MAMMARY ARTERY HARVEST, LEFT SAPHENOUS ENDOSCOPIC VESSEL PROCUREMENT,TRANSESOPHAGEAL ECHOCARDIOGRAM , EPI AORTIC ULTRASOUND  ANESTHESIA TRANSESOPHAGEAL ECHOCARDIOGRAM    Post Op day #: 4    Subjective (Patient focus/Primary Problem for shift): Comfort and sleep.          Pain Goal 2 Pain Rating 2          Pain Medication/ Regime effective to reduce patient pain: No PRN given.    Objective (Physical assessment):           Rhythm: normal sinus rhythm            Bowel Activity: no if Yes indicate when: none          Bowel Medications: yes            Incision: healing well          Incentive Spirometry Q 1-2 hour when awake:  not applicable Volume: 1250 ml          Epicardial Pacing Wires:  no            Patient Activity:           Up to chair for meals: yes          Ambulation with RN x2 (Not including CR): not applicable            Is patient in home clothes:no             Chest Tubes   Pleural: no Draining: not applicable               Suction: not applicable              Mediastinal: no Draining: not applicable               Suction: not applicable   Dressing Change Daily:Yes If No, why? Changed.                     Urinary Catheter: no           Preventative WOC consult (need MD order): no       Assessment (Nursing primary shift focus): Pain increased with movement per patient. Lungs diminished. Using yaunker suction for oral secretions. Encouraged to use IS and flutter valve.    Plan (Patient Care Plan/focus): Needs to shower today.      Dalia Taveras RN   12/10/2023   5:35 AM

## 2023-12-10 NOTE — PROGRESS NOTES
Care Management Follow Up    Length of Stay (days): 4    Expected Discharge Date: 12/11/2023     Concerns to be Addressed: medical progression     Patient plan of care discussed at interdisciplinary rounds: Yes    Anticipated Discharge Disposition: Home, Outpatient Rehab (PT, OT, SLP, Cardiac or Pulmonary)     Anticipated Discharge Services:    Anticipated Discharge DME:      Patient/family educated on Medicare website which has current facility and service quality ratings:    Education Provided on the Discharge Plan: Yes  Patient/Family in Agreement with the Plan:      Referrals Placed by CM/SW:    Private pay costs discussed: Not applicable    Additional Information:  Chart review: pt is POD#4 CABGx4. Plan is for pt to have OP cardiac rehab at discharge.    RNCM to follow for medical progression, recommendations, and final discharge plan.      Alexa Landa RN

## 2023-12-10 NOTE — PROGRESS NOTES
"CVTS Daily Progress Note   POD#4 s/p CAB x 4  Attending: Dr Schwab  LOS: 4    SUBJECTIVE/INTERVAL EVENTS:    No acute events overnight. Patient progressing well. Maintaining oxygen saturations on RA. Normotensive. NSR. Up to chair this am. Pain well controlled. -BM /+ flatus. Tolerating diet. UOP adequate.  Hgb stable. Patient denies new chest pain, shortness of breath, abdominal pain, calf pain, nausea. Patient has no questions today.     OBJECTIVE:  Temp:  [97.7  F (36.5  C)-100.4  F (38  C)] 98.9  F (37.2  C)  Pulse:  [] 99  Resp:  [18-28] 28  BP: ()/(53-65) 128/65  SpO2:  [85 %-96 %] 94 %  Vitals:    12/06/23 0646 12/08/23 0530 12/09/23 0413 12/10/23 0358   Weight: 78.1 kg (172 lb 1.6 oz) 81.1 kg (178 lb 12.8 oz) 77.5 kg (170 lb 12.8 oz) 79.7 kg (175 lb 12.8 oz)       Clinically Significant Risk Factors              # Hypoalbuminemia: Lowest albumin = 3.1 g/dL at 12/7/2023  3:58 AM, will monitor as appropriate              # Overweight: Estimated body mass index is 29.25 kg/m  as calculated from the following:    Height as of 11/22/23: 1.651 m (5' 5\").    Weight as of this encounter: 79.7 kg (175 lb 12.8 oz).      # Financial/Environmental Concerns: none   # History of CABG: noted on surgical history      Current Medications:    Scheduled Meds:   aspirin  162 mg Oral or NG Tube Daily    ezetimibe  10 mg Oral QPM    furosemide  40 mg Intravenous BID    heparin ANTICOAGULANT  5,000 Units Subcutaneous Q8H    insulin aspart  1-7 Units Subcutaneous TID AC    insulin aspart  1-5 Units Subcutaneous At Bedtime    lidocaine  1-2 patch Transdermal Q24H    magnesium oxide  400 mg Oral Q4H    metoprolol tartrate  12.5 mg Oral TID    metoprolol tartrate  25 mg Oral BID    multivitamin, therapeutic  1 tablet Oral Daily    pantoprazole  40 mg Oral or NG Tube Daily    Or    pantoprazole  40 mg Oral Daily    polyethylene glycol  17 g Oral Daily    senna-docusate  1 tablet Oral BID    simvastatin  80 mg Oral At " Bedtime     Continuous Infusions:      PRN Meds:.acetaminophen, bisacodyl, calcium gluconate, calcium gluconate, calcium gluconate, glucose **OR** dextrose **OR** glucagon, glucose **OR** dextrose **OR** glucagon, hydrALAZINE, ketorolac, lactated ringers, magnesium hydroxide, naloxone **OR** naloxone **OR** naloxone **OR** naloxone, ondansetron **OR** ondansetron, oxyCODONE **OR** oxyCODONE, prochlorperazine **OR** prochlorperazine    Cardiographics:    Telemetry monitoring demonstrates NSR with rates in the 80-90 per my personal review.    Imaging:  Results for orders placed or performed during the hospital encounter of 12/06/23   XR Chest Port 1 View    Impression    IMPRESSION: Postsurgical changes from interval coronary artery bypass grafting. Endotracheal tube terminates approximately 3.9 cm above the nancy. Enteric decompression tube tip is in the lower esophagus and could be advanced at least 10 cm to reach the   stomach. Right neck central venous catheter tip in the upper SVC. Bilateral chest tubes and ascending mediastinal drain also present.    Low lung volumes with bibasilar atelectasis. No pleural effusion or pneumothorax.    Normal cardiomediastinal silhouette with median sternotomy wires and coronary surgical clips.   XR Chest Port 1 View    Impression    IMPRESSION: Poststernotomy and coronary artery bypass grafting. Mediastinal and bilateral pleural drains remain in position. Right internal jugular venous catheter tip in the SVC. Mild atelectasis in both lower lungs. No visible pneumothorax. Stable mild   cardiomegaly. Normal pulmonary vascularity.       Labs, personally reviewed.  Hemoglobin   Date Value Ref Range Status   12/07/2023 10.0 (L) 13.3 - 17.7 g/dL Final   12/06/2023 11.5 (L) 13.3 - 17.7 g/dL Final   12/06/2023 11.1 (L) 13.3 - 17.7 g/dL Final     Hemoglobin POCT   Date Value Ref Range Status   12/06/2023 11.7 (L) 13.3 - 17.7 g/dL Final   12/06/2023 10.5 (L) 13.3 - 17.7 g/dL Final    12/06/2023 10.1 (L) 13.3 - 17.7 g/dL Final     WBC Count   Date Value Ref Range Status   12/07/2023 14.3 (H) 4.0 - 11.0 10e3/uL Final   12/06/2023 23.0 (H) 4.0 - 11.0 10e3/uL Final   12/06/2023 22.0 (H) 4.0 - 11.0 10e3/uL Final     Platelet Count   Date Value Ref Range Status   12/10/2023 171 150 - 450 10e3/uL Final   12/07/2023 140 (L) 150 - 450 10e3/uL Final   12/06/2023 142 (L) 150 - 450 10e3/uL Final     Creatinine   Date Value Ref Range Status   12/07/2023 1.05 0.67 - 1.17 mg/dL Final   12/06/2023 1.12 0.67 - 1.17 mg/dL Final   12/04/2023 1.20 (H) 0.67 - 1.17 mg/dL Final     Potassium   Date Value Ref Range Status   12/10/2023 3.9 3.4 - 5.3 mmol/L Final   12/09/2023 3.5 3.4 - 5.3 mmol/L Final   12/08/2023 4.0 3.4 - 5.3 mmol/L Final   11/17/2021 4.3 3.5 - 5.0 mmol/L Final   10/26/2020 4.9 3.5 - 5.0 mmol/L Final   09/25/2019 4.1 3.5 - 5.0 mmol/L Final     Potassium POCT   Date Value Ref Range Status   12/06/2023 4.5 3.5 - 5.0 mmol/L Final   12/06/2023 5.0 3.5 - 5.0 mmol/L Final   12/06/2023 4.8 3.5 - 5.0 mmol/L Final     Magnesium   Date Value Ref Range Status   12/10/2023 2.0 1.7 - 2.3 mg/dL Final   12/09/2023 2.1 1.7 - 2.3 mg/dL Final   12/08/2023 2.1 1.7 - 2.3 mg/dL Final          I/O:  I/O last 3 completed shifts:  In: 540 [P.O.:540]  Out: 1450 [Urine:1450]       Physical Exam:    General: Patient seen up in chair. NAD. Conversant. Pleasant.   HEENT: ANIBLA, no sclera icterus, moist mucosa  CV: RRR on monitor. 2+ peripheral pulses in all extremities. Mild edema.   Pulm: Non-labored effort on RA.  Incision C/D/I.  Abd: Soft, NT, ND  : Voiding  Ext: Mild pedal edema, SCDs in place, warm, distal pulses intact  Neuro: CNs grossly intact.       ASSESSMENT/PLAN:    Royce Feliz is a 73 year old male with a history of CAD who is s/p CAB x 4.    Principal Problem:    Coronary artery disease due to calcified coronary lesion        NEURO:   - Scheduled Tylenol/lidocaine patches and PRN  Tylenol/oxycodone/dilaudid for pain    CV:   - Pre-op EF 60-65%  - Normotensive  - Metoprolol 25 mg two times a day with parameters and extra doses available prn  - ASA 81mg  - Simvastatin 80mg daily  - Zetia 10 mg HS  - TPW's and mediastinal chest tube removed without immediate complications 12/8. Bilat pleural chest tubes removed without immediate complications 12/9    PULM:   - Maintaining oxygen saturations on RA  - Encourage pulmonary toilet    FEN/GI:  - Continue electrolyte replacement protocol  - Diet: Cardiac, ADAT   - Bowel regimen    RENAL:  - Adequate UOP/hr. Continue to monitor closely.  - Cr 1.05  - Boggs removed POD#2  - Diuresis with 40mg IV Lasix two times a day    HEME:  - Acute blood loss anemia post-op.   - Hgb stable, no bleeding concerns. Hep SQ, ASA  - Plavix before discharge and after chest tubes removed for 1 year per surgeon preference(decrease ASA)    ID:  - Ashanti op ppx complete, afebrile. No concerns for infection    ENDO:    - Transition to SSI    PPx:   - DVT: SCDs, SQ heparin TID, ambulation   - GI: Protonix 40mg PO daily    DISPO:   - Continue general telemetry status  - Home in am    Patient discussed with Dr. Mulvihill Gina McCrone,PA-C  [facility]othoracic Surgery  [facility] or pager [phone]

## 2023-12-11 ENCOUNTER — APPOINTMENT (OUTPATIENT)
Dept: OCCUPATIONAL THERAPY | Facility: HOSPITAL | Age: 73
DRG: 236 | End: 2023-12-11
Attending: SURGERY
Payer: COMMERCIAL

## 2023-12-11 VITALS
SYSTOLIC BLOOD PRESSURE: 128 MMHG | DIASTOLIC BLOOD PRESSURE: 79 MMHG | OXYGEN SATURATION: 92 % | TEMPERATURE: 97.7 F | RESPIRATION RATE: 20 BRPM | BODY MASS INDEX: 28.09 KG/M2 | HEART RATE: 102 BPM | WEIGHT: 168.8 LBS

## 2023-12-11 LAB
CALCIUM, IONIZED MEASURED: 1.13 MMOL/L (ref 1.11–1.3)
GLUCOSE BLDC GLUCOMTR-MCNC: 104 MG/DL (ref 70–99)
GLUCOSE BLDC GLUCOMTR-MCNC: 130 MG/DL (ref 70–99)
ION CA PH 7.4: 1.17 MMOL/L (ref 1.11–1.3)
MAGNESIUM SERPL-MCNC: 2.2 MG/DL (ref 1.7–2.3)
PH: 7.46 (ref 7.35–7.45)
PHOSPHATE SERPL-MCNC: 3.4 MG/DL (ref 2.5–4.5)
POTASSIUM SERPL-SCNC: 3.9 MMOL/L (ref 3.4–5.3)

## 2023-12-11 PROCEDURE — 97110 THERAPEUTIC EXERCISES: CPT | Mod: GO

## 2023-12-11 PROCEDURE — 84100 ASSAY OF PHOSPHORUS: CPT | Performed by: SURGERY

## 2023-12-11 PROCEDURE — 250N000013 HC RX MED GY IP 250 OP 250 PS 637: Performed by: PHYSICIAN ASSISTANT

## 2023-12-11 PROCEDURE — 82330 ASSAY OF CALCIUM: CPT | Performed by: PHYSICIAN ASSISTANT

## 2023-12-11 PROCEDURE — 250N000011 HC RX IP 250 OP 636: Mod: JZ | Performed by: PHYSICIAN ASSISTANT

## 2023-12-11 PROCEDURE — 84132 ASSAY OF SERUM POTASSIUM: CPT | Performed by: SURGERY

## 2023-12-11 PROCEDURE — 36415 COLL VENOUS BLD VENIPUNCTURE: CPT | Performed by: SURGERY

## 2023-12-11 PROCEDURE — 83735 ASSAY OF MAGNESIUM: CPT | Performed by: SURGERY

## 2023-12-11 RX ORDER — CLOPIDOGREL BISULFATE 75 MG/1
75 TABLET ORAL DAILY
Qty: 30 TABLET | Refills: 11 | Status: SHIPPED | OUTPATIENT
Start: 2023-12-12 | End: 2024-01-08

## 2023-12-11 RX ORDER — METOPROLOL TARTRATE 37.5 MG/1
37.5 TABLET, FILM COATED ORAL 2 TIMES DAILY
Qty: 90 TABLET | Refills: 3 | Status: SHIPPED | OUTPATIENT
Start: 2023-12-11 | End: 2023-12-20

## 2023-12-11 RX ORDER — AMOXICILLIN 250 MG
1 CAPSULE ORAL 2 TIMES DAILY
Qty: 14 TABLET | Refills: 0 | Status: SHIPPED | OUTPATIENT
Start: 2023-12-11 | End: 2024-08-23

## 2023-12-11 RX ORDER — ACETAMINOPHEN 500 MG
500-1000 TABLET ORAL EVERY 6 HOURS PRN
COMMUNITY
Start: 2023-12-11

## 2023-12-11 RX ADMIN — ASPIRIN 81 MG: 81 TABLET, CHEWABLE ORAL at 08:11

## 2023-12-11 RX ADMIN — PANTOPRAZOLE SODIUM 40 MG: 40 TABLET, DELAYED RELEASE ORAL at 08:11

## 2023-12-11 RX ADMIN — SENNOSIDES AND DOCUSATE SODIUM 1 TABLET: 8.6; 5 TABLET ORAL at 08:11

## 2023-12-11 RX ADMIN — POLYETHYLENE GLYCOL 3350 17 G: 17 POWDER, FOR SOLUTION ORAL at 08:11

## 2023-12-11 RX ADMIN — CLOPIDOGREL BISULFATE 75 MG: 75 TABLET ORAL at 08:11

## 2023-12-11 RX ADMIN — HEPARIN SODIUM 5000 UNITS: 5000 INJECTION, SOLUTION INTRAVENOUS; SUBCUTANEOUS at 06:48

## 2023-12-11 RX ADMIN — METOPROLOL TARTRATE 37.5 MG: 25 TABLET, FILM COATED ORAL at 08:12

## 2023-12-11 RX ADMIN — METOPROLOL TARTRATE 12.5 MG: 25 TABLET, FILM COATED ORAL at 03:49

## 2023-12-11 RX ADMIN — THERA TABS 1 TABLET: TAB at 08:11

## 2023-12-11 ASSESSMENT — ACTIVITIES OF DAILY LIVING (ADL)
ADLS_ACUITY_SCORE: 25

## 2023-12-11 NOTE — PLAN OF CARE
sc  Patient Name: Royce Feliz   MRN: [id number]   Date of Admission: 12/6/2023     Procedure: Procedure(s):  CORONARY ARTERY BYPASS GRAFT TIMES FOUR , LEFT INTERNAL MAMMARY ARTERY HARVEST, LEFT SAPHENOUS ENDOSCOPIC VESSEL PROCUREMENT,TRANSESOPHAGEAL ECHOCARDIOGRAM , EPI AORTIC ULTRASOUND  ANESTHESIA TRANSESOPHAGEAL ECHOCARDIOGRAM     Post Op day #: 5     Subjective (Patient focus/Primary Problem for shift): Sleep and comfort.          Pain Goal  0 Pain Rating 0           Pain Medication/ Regime effective to reduce patient pain: Yes     Objective (Physical assessment):           Rhythm: normal sinus rhythm             Bowel Activity: yes if Yes indicate when: 12/10/2023          Bowel Medications: yes             Incision: healing well          Incentive Spirometry Q 1-2 hour when awake:  yes Volume: 2000 m             Patient Activity:           Up to chair for meals: yes          Ambulation with RN x2 (Not including CR): Yes           Is patient in home clothes: Yes                     Urinary Catheter: no            Preventative WOC consult (need MD order): no         Assessment (Nursing primary shift focus): Vitals stable. Pt denies any pain. On room air.Showered. Worked with OT.      Plan (Patient Care Plan/focus): Discharged today at 1330        Romel Crawford RN   12/11/2023   1:30 PM    Discharge to: Home  Transportation: Wife  Time:1330  Prescriptions: Discharge pharmacy  Belongings: suitcase, cell phone, cell phone , clothes, shoes, glasses  Access: Removed  Care plan and education discontinued: Done  Paperwork: [facility]

## 2023-12-11 NOTE — PROGRESS NOTES
Care Management Discharge Note    Discharge Date: 12/11/2023       Discharge Disposition: Home    Discharge Services: None    Discharge DME: None    Discharge Transportation: family or friend will provide    Private pay costs discussed: Not applicable    Does the patient's insurance plan have a 3 day qualifying hospital stay waiver?  No    PAS Confirmation Code: N/A  Patient/family educated on Medicare website which has current facility and service quality ratings: no    Education Provided on the Discharge Plan: Yes  Persons Notified of Discharge Plans: per team  Patient/Family in Agreement with the Plan: yes    Handoff Referral Completed: Yes    Additional Information:  Plan is for Pt to discharge home with his wife. Pt is independent. Pt will complete outpatient cardiac rehab after discharge. Family to transport. No CM needs requested or identified.    CM will sign off. Please contact CM if any additional needs arise prior to discharge.       REYNOLD Shetty

## 2023-12-11 NOTE — PLAN OF CARE
sc  Patient Name: Royce Feliz   MRN: [id number]   Date of Admission: 12/6/2023    Procedure: Procedure(s):  CORONARY ARTERY BYPASS GRAFT TIMES FOUR , LEFT INTERNAL MAMMARY ARTERY HARVEST, LEFT SAPHENOUS ENDOSCOPIC VESSEL PROCUREMENT,TRANSESOPHAGEAL ECHOCARDIOGRAM , EPI AORTIC ULTRASOUND  ANESTHESIA TRANSESOPHAGEAL ECHOCARDIOGRAM    Post Op day #: 5    Subjective (Patient focus/Primary Problem for shift): Sleep and comfort.          Pain Goal  0 Pain Rating 0           Pain Medication/ Regime effective to reduce patient pain: Yes    Objective (Physical assessment):           Rhythm: normal sinus rhythm            Bowel Activity: yes if Yes indicate when: 12/10/2023          Bowel Medications: yes            Incision: healing well          Incentive Spirometry Q 1-2 hour when awake:  yes Volume: 2000 m            Patient Activity:           Up to chair for meals: yes          Ambulation with RN x2 (Not including CR): not applicable            Is patient in home clothes:no                      Urinary Catheter: no           Preventative WOC consult (need MD order): no       Assessment (Nursing primary shift focus): Vitals stable. Denied any pain when in bed. Put on 2l of oxygen overnight, desat to 89% while sleeping. Has CPAP at home. Lungs still diminished.     Plan (Patient Care Plan/focus): Possible discharge home today.      Dalia Taveras RN   12/11/2023   5:26 AM

## 2023-12-11 NOTE — PLAN OF CARE
Cardiac Rehab Discharge Summary    Reason for therapy discharge:    Discharged to home with outpatient therapy.    Progress towards therapy goal(s). See goals on Care Plan in [facility] electronic health record for goal details.  Goals met    Therapy recommendation(s):    Continued therapy is recommended.  Rationale/Recommendations:  Outpt. Cardiac rehab.

## 2023-12-11 NOTE — CARE PLAN
..sc  Patient Name: Royce Feliz   MRN: [id number]   Date of Admission: 12/6/2023    Procedure: Procedure(s):  CORONARY ARTERY BYPASS GRAFT TIMES FOUR , LEFT INTERNAL MAMMARY ARTERY HARVEST, LEFT SAPHENOUS ENDOSCOPIC VESSEL PROCUREMENT,TRANSESOPHAGEAL ECHOCARDIOGRAM , EPI AORTIC ULTRASOUND  ANESTHESIA TRANSESOPHAGEAL ECHOCARDIOGRAM    Post Op day #:4    Subjective (Patient focus/Primary Problem for shift): Increase lung volume. Use IS, Flutter valve at least hourly.           Pain Goal2 Pain Rating2           Pain Medication/ Regime effective to reduce patient pain Tylenol only declined oxycodone lidocaine patches    Objective (Physical assessment):           Rhythm: normal sinus rhythm            Bowel Activity: yes if Yes indicate when: 12/10 x2           Bowel Medications: yes            Incision: oozing slight drainage from chest tube site. Dressing applied           Incentive Spirometry Q 1-2 hour when awake:  yes Volume: 2000          Epicardial Pacing Wires:  not applicable            Patient Activity:           Up to chair for meals: yes          Ambulation with RN x2 (Not including CR): yes            Is patient in home clothes:no             Chest Tubes   Pleural: not applicable Draining: not applicable               Suction: not applicable              Mediastinal: not applicable Draining: no               Suction: no   Dressing Change Daily:yes If No, why?Showered today.                      Urinary Catheter: no           Preventative WOC consult (need MD order): no       Assessment (Nursing primary shift focus): Patient feeling well minimal pain. Up with therapy x2 and x1 with RN. Had BM x2 stomach firm +flatus. Patient 02 dropped for short periods when asleep laying flat without oral sleep apnea device. Now sitting with HOB up slightly and with device sating well when asleep.     Plan (Patient Care Plan/focus):   Probable discharge tomorrow    Tamra Mitchell RN   12/10/2023   10:15 PM

## 2023-12-11 NOTE — DISCHARGE SUMMARY
Cardiovascular Surgery Discharge Summary    Primary Care Physician:  Luis Alfredo Bolanos  Discharge Provider: Maggie Myers PA-C  Admission Date: 12/6/2023  Admission Diagnoses: CAD (coronary artery disease) [I25.10]  Coronary artery disease due to calcified coronary lesion [I25.10, I25.84]  Discharge Date: 12/11/2023  Disposition: Home  Condition at Discharge: Good  Code Status: Full Code     Principal Diagnosis:   Coronary artery disease due to calcified coronary lesion s/p coronary artery bypass grafting x 4    Discharge Diagnoses:    Active Problems:      Patient Active Problem List   Diagnosis    Hypercholesteremia    Prostatism    High cholesterol    Malignant melanoma of torso excluding breast (H)    Chronic kidney disease, stage 3a (H)    Coronary artery disease due to calcified coronary lesion    Precordial pain    Status post coronary angiogram    Abnormal cardiac CT angiography    Abnormal findings on diagnostic imaging of heart and coronary circulation    Coronary artery disease involving native coronary artery of native heart with angina pectoris (H24)         Consult/s: Dietary, critical care medicine, cardiology,     Surgery: 12/6/23  CAB x 4 with Dr. Schwab  1.  Coronary artery bypass grafting x 4 (reversed saphenous vein graft to the right posterior descending coronary artery, reversed saphenous vein graft to the right posterolateral branch of the right coronary artery, reversed saphenous vein graft to the diagonal branch of the left anterior descending coronary artery, and pedicled left internal mammary artery to left anterior descending coronary artery).  2.  Endoscopic vein harvest of the greater saphenous vein from the left lower extremity.    OPERATIVE FINDINGS:  The left internal mammary artery was 1.75 mm in diameter and had excellent flow.  The greater saphenous vein from the left lower extremity was 3-4 mm in diameter and suitable for conduit.  The ascending aorta was free of calcified  plaque.  The right posterior descending coronary artery was 1.75 mm in diameter and free of disease at the site of anastomosis. The right posterolateral branch of the right coronary artery was 1.75 mm in diameter and free of disease at the site of anastomosis. The diagonal branch of the left anterior descending coronary artery was 1.75 mm in diameter and free of disease at the site anastomosis.  The left anterior descending coronary artery 1.75 mm in diameter and free of disease at the site of anastomosis.  After reperfusion, sinus rhythm resumed.  Left ventricular function was normal preoperatively and unchanged after bypass on low-dose inotropic support.     Discharge Medications:      Review of your medicines        START taking        Dose / Directions   acetaminophen 500 MG tablet  Commonly known as: TYLENOL  Used for: S/P CABG (coronary artery bypass graft)      Dose: 500-1,000 mg  Take 1-2 tablets (500-1,000 mg) by mouth every 6 hours as needed for mild pain  Refills: 0     clopidogrel 75 MG tablet  Commonly known as: PLAVIX  Used for: S/P CABG (coronary artery bypass graft)      Dose: 75 mg  Start taking on: December 12, 2023  Take 1 tablet (75 mg) by mouth daily  Quantity: 30 tablet  Refills: 11     Metoprolol Tartrate 37.5 MG Tabs  Used for: S/P CABG (coronary artery bypass graft)      Dose: 37.5 mg  Take 37.5 mg by mouth 2 times daily  Quantity: 90 tablet  Refills: 3     senna-docusate 8.6-50 MG tablet  Commonly known as: SENOKOT-S/PERICOLACE  Used for: S/P CABG (coronary artery bypass graft)      Dose: 1 tablet  Take 1 tablet by mouth 2 times daily  Quantity: 14 tablet  Refills: 0            CONTINUE these medicines which have NOT CHANGED        Dose / Directions   aspirin 81 MG EC tablet  Commonly known as: ASA      Dose: 1 tablet  [ASPIRIN 81 MG EC TABLET] Take 1 tablet by mouth.  Refills: 0     coenzyme Q-10 200 MG Caps capsule  Used for: Hypercholesteremia      Dose: 200 mg  [COENZYME Q10 200 MG  CAPSULE] Take 200 mg by mouth daily.  Quantity: 90 capsule  Refills: 3     ezetimibe 10 MG tablet  Commonly known as: ZETIA      Dose: 10 mg  Take 10 mg by mouth every evening  Refills: 0     fish oil-omega-3 fatty acids 1000 MG capsule      Dose: 2 g  Take 2 g by mouth daily  Refills: 0     MULTIVITAMIN ADULT PO      Dose: 1 tablet  Take 1 tablet by mouth daily  Refills: 0     omeprazole 20 MG DR capsule  Commonly known as: PriLOSEC  Used for: Atypical chest pain      TAKE ONE CAPSULE BY MOUTH DAILY  Quantity: 90 capsule  Refills: 3     sildenafil 50 MG tablet  Commonly known as: VIAGRA  Used for: Hypercholesteremia, Routine general medical examination at a health care facility, Erectile disorder, generalized, mild      [SILDENAFIL (VIAGRA) 50 MG TABLET] TAKE 1 TABLET BY MOUTH 1 HOUR BEFORE NEEDED  Quantity: 18 tablet  Refills: 3     simvastatin 80 MG tablet  Commonly known as: ZOCOR  Used for: Hyperlipidemia LDL goal <100      TAKE 1 TABLET BY MOUTH DAILY  Quantity: 90 tablet  Refills: 2               Where to get your medicines        These medications were sent to [facility] [address] [address]      Phone: [phone]   clopidogrel 75 MG tablet  Metoprolol Tartrate 37.5 MG Tabs  senna-docusate 8.6-50 MG tablet       Some of these will need a paper prescription and others can be bought over the counter. Ask your nurse if you have questions.    You don't need a prescription for these medications  acetaminophen 500 MG tablet         Discharge Instructions:    Follow up appointment with Primary Care Physician: Luis Alfredo Bolanos within 7 days of discharge.  Follow up appointment with Specialist:    Follow with CV Surgery as scheduled.1/2/24 at 11 am    Follow-up with cardiology as scheduled with Dr Zhou 1/8/24 at 9:20 am    Diet: Cardiac    Activity/Restrictions: As tolerated with sternal precautions in mind (see below).  "No driving for 4 weeks or while on pain medication.     - Shower and wash your incisions daily with soap and water. No tub baths/hot tubs for 4 weeks. An antibacterial soap such as Dial or Safeguard is recommended.    - Check your incisions every day. If you notice any redness, drainage, or anything unusual, please call the surgeons office.    - No driving for 4 weeks after surgery or while on pain medication     - Do not lift anything more than 10 pounds for 6 weeks after surgery. After 6 weeks, advance lifting is tolerated.    - You may have watery drainage from your chest tube site for 2-3 weeks after surgery. Your may cover with a Band-Aid to protect your clothing. Remove the Band-Aid every day and wash the site.    - If you have a leg lesion, you may have swelling for 2-3 months. Elevate your leg any time you are not walking.    - If you feel any \"popping\" or \"clicking\" sensations in your chest, your arms are out too far or you are putting too much weight into arm movements. Do not reach over your head or out to the side to pull something. Do not do any arm exercises or use any exercise equipment that involves arm movement. If you feel your sternum moving, call the surgeon's office.    - Increase your daily activity as explained by Cardiac Rehab. You are encouraged to enroll in an Outpatient Cardiac Rehab Program.    - No active sports using your upper arms for 3 months. This includes fishing, hunting, bowling, swimming, tennis or golf.    - No physical activity such as cutting the grass, raking, vacuuming, changing sheets on your bed, snow shoveling, or using a  for 3 months.    - Use incentive spirometer 6-8 times per day for 2 weeks.       Hospital Summary:   Royce Feliz is a 73 year old male who was admitted to [facility] on 12/6/2023 following an abnormal coronary angiogram demonstrating severe multivessel coronary artery disease.  He was referred to CV surgery for evaluation for " "possible coronary artery revascularization.     Patient was deemed a candidate for coronary artery bypass surgery, and was taken to the operating room on 12/6/23 where patient underwent 4 vessel coronary artery bypass and endoscopic vein harvest from the left leg. Surgery was uneventful and patient was brought to the ICU post-operatively. He was extubated on POD#0 and weaned from pressors. Patient was awake and alert, afebrile, and with stable vitals. Insulin drip was discontinued and he was transitioned to a sliding scale. He was transferred to general telemetry status on POD#1 where patient has had return of bowel function, is maintaining oxygen saturations on room air, had his chest tubes removed, and has no complaints of chest pain or shortness of breath. On 12/11/23, patient was stable enough to be discharged to home.    Of note, patient will be maintained on Plavix for one year post-op per surgeon preference for graft patency. OK to stop Plavix 12/10/24, at which point ASA should be increased to 162mg daily indefinitely s/p CABG.    He is euvolemic at discharge, therefore no additional diuresis needed. He is to continue his home CPAP at night.      Vital signs:  Temp: 97.7  F (36.5  C) Temp src: Oral BP: 110/60 Pulse: 102   Resp: 20 SpO2: 92 % O2 Device: None (Room air) Oxygen Delivery: 2 LPM   Weight: 76.6 kg (168 lb 12.8 oz)  Estimated body mass index is 28.09 kg/m  as calculated from the following:    Height as of 11/22/23: 1.651 m (5' 5\").    Weight as of this encounter: 76.6 kg (168 lb 12.8 oz).      Physical Exam:    Pertinent exam findings on day of discharge include:  Gen: Seen up in chair. NAD. Pleasant and conversant.   CV: RRR on monitor. No edema.  Pulm: Non-labored breathing on room air.  Abd: Soft, non-tender, non-distended  Neuro: CNs grossly intact  Inc: C/D/I    Maggie Myers PA-C  [facility] Cardiothoracic Surgery  Vocera or pager [phone]      "

## 2023-12-12 ENCOUNTER — PATIENT OUTREACH (OUTPATIENT)
Dept: CARE COORDINATION | Facility: CLINIC | Age: 73
End: 2023-12-12
Payer: COMMERCIAL

## 2023-12-12 NOTE — LETTER
[facility] CARE COORDINATION  [facility]  December 12, 2023    Royce Feliz  [address] [place] [address]      Dear Royce,    I am a clinic care coordinator who works with Luis Alfredo Bolanos MD with the [facility]. I wanted to thank you for spending the time to talk with me.  Below is a description of clinic care coordination and how I can further assist you.       The clinic care coordination team is made up of a registered nurse, , financial resource worker and community health worker who understand the health care system. The goal of clinic care coordination is to help you manage your health and improve access to the health care system. Our team works alongside your provider to assist you in determining your health and social needs. We can help you obtain health care and community resources, providing you with necessary information and education. We can work with you through any barriers and develop a care plan that helps coordinate and strengthen the communication between you and your care team.  Our services are voluntary and are offered without charge to you personally.    Please feel free to contact me with any questions or concerns regarding care coordination and what we can offer.      We are focused on providing you with the highest-quality healthcare experience possible.    Sincerely,     Sushila Lindsey,   [facility]  [phone]

## 2023-12-12 NOTE — PROGRESS NOTES
Clinic Care Coordination Contact  [facility]: Post-Discharge Note  SITUATION                                                      Admission:    Admission Date: 12/06/23   Reason for Admission: CAD  Discharge:   Discharge Date: 12/11/23  Discharge Diagnosis: CAD    BACKGROUND                                                      Per hospital discharge summary and inpatient provider notes:  Primary Care Physician:  Luis Alfredo Bolanos  Discharge Provider: Maggie Myers PA-C  Admission Date: 12/6/2023  Admission Diagnoses: CAD (coronary artery disease) [I25.10]  Coronary artery disease due to calcified coronary lesion [I25.10, I25.84]  Discharge Date: 12/11/2023                Principal Diagnosis:   Coronary artery disease due to calcified coronary lesion s/p coronary artery bypass grafting x 4    Discharge Instructions:     Follow up appointment with Primary Care Physician: Luis Alfredo Bolanos within 7 days of discharge.  Follow up appointment with Specialist:               Follow with CV Surgery as scheduled.1/2/24 at 11 am               Follow-up with cardiology as scheduled with Dr Zhou 1/8/24 at 9:20 am     Diet: Cardiac     Activity/Restrictions: As tolerated with sternal precautions in mind (see below). No driving for 4 weeks or while on pain medication.      - Shower and wash your incisions daily with soap and water. No tub baths/hot tubs for 4 weeks. An antibacterial soap such as Dial or Safeguard is recommended.     - Check your incisions every day. If you notice any redness, drainage, or anything unusual, please call the surgeons office.     - No driving for 4 weeks after surgery or while on pain medication      - Do not lift anything more than 10 pounds for 6 weeks after surgery. After 6 weeks, advance lifting is tolerated.     - You may have watery drainage from your chest tube site for 2-3 weeks after surgery. Your may cover with a Band-Aid to protect your clothing. Remove the Band-Aid every day and wash  "the site.     - If you have a leg lesion, you may have swelling for 2-3 months. Elevate your leg any time you are not walking.     - If you feel any \"popping\" or \"clicking\" sensations in your chest, your arms are out too far or you are putting too much weight into arm movements. Do not reach over your head or out to the side to pull something. Do not do any arm exercises or use any exercise equipment that involves arm movement. If you feel your sternum moving, call the surgeon's office.     - Increase your daily activity as explained by Cardiac Rehab. You are encouraged to enroll in an Outpatient Cardiac Rehab Program.     - No active sports using your upper arms for 3 months. This includes fishing, hunting, bowling, swimming, tennis or golf.     - No physical activity such as cutting the grass, raking, vacuuming, changing sheets on your bed, snow shoveling, or using a  for 3 months.     - Use incentive spirometer 6-8 times per day for 2 weeks.     ASSESSMENT           Discharge Assessment  How are you doing now that you are home?: well, no pain, moving around  How are your symptoms? (Red Flag symptoms escalate to triage hotline per guidelines): Improved  Do you feel your condition is stable enough to be safe at home until your provider visit?: Yes  Does the patient have their discharge instructions? : Yes  Does the patient have questions regarding their discharge instructions? : No  Were you started on any new medications or were there changes to any of your previous medications? : Yes  Does the patient have all of their medications?: Yes  Do you have questions regarding any of your medications? : No  Do you have all of your needed medical supplies or equipment (DME)?  (i.e. oxygen tank, CPAP, cane, etc.): Yes  Discharge follow-up appointment scheduled within 14 calendar days? : Yes  Discharge Follow Up Appointment Date: 12/20/23  Discharge Follow Up Appointment Scheduled with?: Primary Care Provider     "     Post-op (Clinicians Only)  Did the patient have surgery or a procedure: Yes  Incision: closed  Drainage: No  Chills: No  Redness: No  Warmth: No  Swelling: No  Incision site pain: No  Eating & Drinking: eating and drinking without complaints/concerns  PO Intake: regular diet  Bowel Function: normal  Urinary Status: voiding without complaint/concerns    Had great care in hospital.  Has upcoming appt for outpatient cardiac rehab.  Wife is assisting as needed.  Grateful to have gotten this procedure as he may have  suddenly if not.  He hasn't had any pain.  No other concerns.  Reviewed care coordination and he will call with questions.     PLAN                                                      Outpatient Plan:  will attend appts as scheduled.     Future Appointments   Date Time Provider Department Center   2023  8:00 AM CONSUELO CARDIAC REHAB RESOURCE 2 [facility]   2023  9:00 AM [facility]   2024 11:00 [facility]   2024  9:20 AM Severiano Zhou MD [facility]         For any urgent concerns, please contact our 24 hour nurse triage line: [phone] ([phone])         REYNOLD Olsen

## 2023-12-13 ENCOUNTER — TELEPHONE (OUTPATIENT)
Dept: CARDIOLOGY | Facility: CLINIC | Age: 73
End: 2023-12-13
Payer: COMMERCIAL

## 2023-12-13 NOTE — TELEPHONE ENCOUNTER
Called and left  for pt requesting call back, CV RN contact info provided.    Cardiovascular Surgery  [facility]    DISCHARGE FOLLOW UP PHONE CALL      POST OP MONITORING  How is your pain on a 0-10 scale, how are you managing your pain? Pain is being managed with tylenol.    ACTIVITY  How is your activity tolerance? Up and walking well.  Are you still doing sternal precautions? Yes.  Do you hear any clicking when you are moving or taking a deep breath? No.    Are you weighing yourself daily? Yes pt is currently 164 lbs.    SIGNS AND SYMPTOMS OF INFECTION  INCREASE IN PAIN - No  FEVER - No  DRAINAGE - No If so, color: NA  REDNESS - No  SWELLING - No    ASSISTANCE  Do you have someone at home to assist you with your daily activities? Spouse is assisting pt at home.    MEDICATIONS  Is someone helping you to set up your medications? Spouse is managing.  Do you have any questions about your medications? No.    Are you on a blood thinner? Plavix.  Who is managing your INRs? NA    FOLLOW UP  You are scheduled to see your primary care physician on 12/20.  You are scheduled to see our surgery advanced practive provider for post operative follow up on 1/2.    You are scheduled for cardiac rehab on 12/19.  You are scheduled to see your cardiologist on 1/8.    CONTACT INFORMATION  Please feel free to call us with any questions or symptoms that are concerning for you at [phone]. If it is after 4:00 in the afternoon, or a weekend please call [phone] and ask for the on call specialist. We want to do everything we can to help prevent you needing to return to the ED, so please do not hesitate to call us.    Priyanka Nunez RN  Cardiovascular Surgery  [phone]  December 13, 2023 12:54 PM    ----- Message from Priyanka Nunez RN sent at 12/11/2023  8:51 AM CST -----  POC 12/13    ----- Message -----  From: Maggie Myers PA-C  Sent: 12/11/2023   8:48 AM CST  To: Priyanka Nunez RN    Mr Feliz  discharged to home today.  CAB x 4 with Dr Schwab 12/6/23    Thanks,    Maggie

## 2023-12-19 ENCOUNTER — HOSPITAL ENCOUNTER (OUTPATIENT)
Dept: CARDIAC REHAB | Facility: HOSPITAL | Age: 73
Discharge: HOME | End: 2023-12-19
Attending: PHYSICIAN ASSISTANT
Payer: COMMERCIAL

## 2023-12-19 DIAGNOSIS — Z95.1 S/P CABG (CORONARY ARTERY BYPASS GRAFT): ICD-10-CM

## 2023-12-19 PROCEDURE — 93798 PHYS/QHP OP CAR RHAB W/ECG: CPT

## 2023-12-19 PROCEDURE — 93797 PHYS/QHP OP CAR RHAB WO ECG: CPT | Mod: XU

## 2023-12-20 ENCOUNTER — OFFICE VISIT (OUTPATIENT)
Dept: FAMILY MEDICINE | Facility: CLINIC | Age: 73
End: 2023-12-20
Payer: COMMERCIAL

## 2023-12-20 VITALS
BODY MASS INDEX: 27.87 KG/M2 | DIASTOLIC BLOOD PRESSURE: 70 MMHG | TEMPERATURE: 98.1 F | RESPIRATION RATE: 12 BRPM | OXYGEN SATURATION: 98 % | SYSTOLIC BLOOD PRESSURE: 114 MMHG | WEIGHT: 167.5 LBS | HEART RATE: 89 BPM

## 2023-12-20 DIAGNOSIS — Z95.1 S/P CABG (CORONARY ARTERY BYPASS GRAFT): ICD-10-CM

## 2023-12-20 DIAGNOSIS — I25.84 CORONARY ARTERY DISEASE DUE TO CALCIFIED CORONARY LESION: Primary | ICD-10-CM

## 2023-12-20 DIAGNOSIS — I25.10 CORONARY ARTERY DISEASE DUE TO CALCIFIED CORONARY LESION: Primary | ICD-10-CM

## 2023-12-20 PROCEDURE — 99213 OFFICE O/P EST LOW 20 MIN: CPT | Performed by: FAMILY MEDICINE

## 2023-12-20 RX ORDER — METOPROLOL TARTRATE 37.5 MG/1
37.5 TABLET, FILM COATED ORAL 2 TIMES DAILY
Qty: 90 TABLET | Refills: 3 | Status: SHIPPED | OUTPATIENT
Start: 2023-12-20 | End: 2024-01-08

## 2023-12-20 RX ORDER — SIMVASTATIN 80 MG
TABLET ORAL
Qty: 90 TABLET | Refills: 2 | Status: SHIPPED | OUTPATIENT
Start: 2023-12-20 | End: 2024-08-27 | Stop reason: ALTCHOICE

## 2023-12-20 RX ORDER — EZETIMIBE 10 MG/1
10 TABLET ORAL EVERY EVENING
Qty: 90 TABLET | Refills: 3 | Status: SHIPPED | OUTPATIENT
Start: 2023-12-20

## 2023-12-20 RX ORDER — ASPIRIN 81 MG/1
81 TABLET ORAL DAILY
Qty: 100 TABLET | Refills: 3 | Status: SHIPPED | OUTPATIENT
Start: 2023-12-20

## 2023-12-20 ASSESSMENT — PAIN SCALES - GENERAL: PAINLEVEL: NO PAIN (0)

## 2023-12-20 NOTE — PROGRESS NOTES
[phone]- [facility]; Alexa.  Consult first then determine if at home or at center sleep study is appropriate.  All locations are booking out until March 2024.    Ord had a cancellation this Friday 12/22/23 at 0745 arrival time.  Need to bring old machine with power cord.    Sleep Clinic Appointments: Park in the Red Parking Ramp. Use the skyway to get from the ramp to the [facility], you will be on the 2nd floor. Once you are in the Professional Building you will take the stairs or elevator down to the 1st floor, go to [address] and check in with the .    From Sign In Desk:    Department Address: [address] [place] [address], [address]  [facility] [address]  Department Phone: [phone]    Informed pt of above info and he stated that will work for him. Writer will send above info via PagerDuty message to pt and reminded him that he should be able to see visit details under Visits on PagerDutyhart. Patient verbalized understanding.    Analy Sol, PACHECON, RN  [facility]

## 2023-12-20 NOTE — PROGRESS NOTES
12/12/2023    10:09 AM   Post Discharge Outreach   Admission Date 12/6/2023   Reason for Admission CAD   Discharge Date 12/11/2023   Discharge Diagnosis CAD   How are you doing now that you are home? well, no pain, moving around   How are your symptoms? (Red Flag symptoms escalate to triage hotline per guidelines) Improved   Do you feel your condition is stable enough to be safe at home until your provider visit? Yes   Does the patient have their discharge instructions?  Yes   Does the patient have questions regarding their discharge instructions?  No   Were you started on any new medications or were there changes to any of your previous medications?  Yes   Does the patient have all of their medications? Yes   Do you have questions regarding any of your medications?  No   Do you have all of your needed medical supplies or equipment (DME)?  (i.e. oxygen tank, CPAP, cane, etc.) Yes   Discharge follow-up appointment scheduled within 14 calendar days?  Yes   Discharge Follow Up Appointment Date 12/20/2023   Discharge Follow Up Appointment Scheduled with? Primary Care Provider     Hospital Follow-up Visit:    Hospital/Nursing Home/ Rehab Facility: [facility]  Date of Admission: 12/6/23  Date of Discharge: 12/11/23  Reason(s) for Admission: CABG     Was your hospitalization related to COVID-19? No   Problems taking medications regularly:  None  Medication changes since discharge: None  Problems adhering to non-medication therapy:  None    Summary of hospitalization:  [facility] discharge summary reviewed  Diagnostic Tests/Treatments reviewed.  Follow up needed: none  Other Healthcare Providers Involved in Patient s Care:         None  Update since discharge: improved.         Plan of care communicated with patient         History of present illness and plan of care: Patient is accompanied by his wife he is 7 days post four-vessel CABG; he is doing quite well he is having  some substernal and incisional discomfort related to movement and some soreness in the site of his donor veins but otherwise doing quite well some pain when he coughs.  He does have sleep apnea and cannot get his machine repaired.  We have placed an urgent referral for a sleep study and new CPAP [employer] equipment.  He had a sleep study in 2009 and qualified easily for ongoing CPAP equipment.  He is now Medicare patient.  Fortunately we are able to get him an appointment here on Friday.  This is urgent.  I also refilled all of his medications for 90 days as he plans on going to [place] in January.  Overall he and his wife had usual postoperative questions.  He is seeing the cardiologist in 6 weeks.  He is seeing the surgeon in 1 week.  I inspected his incisions and he is really doing well time spent face-to-face non-face-to-face 40 minutes

## 2023-12-22 ENCOUNTER — HOSPITAL ENCOUNTER (OUTPATIENT)
Dept: CARDIAC REHAB | Facility: HOSPITAL | Age: 73
Discharge: HOME | End: 2023-12-22
Attending: INTERNAL MEDICINE
Payer: COMMERCIAL

## 2023-12-22 ENCOUNTER — OFFICE VISIT (OUTPATIENT)
Dept: SLEEP MEDICINE | Facility: CLINIC | Age: 73
End: 2023-12-22
Attending: FAMILY MEDICINE
Payer: COMMERCIAL

## 2023-12-22 VITALS
WEIGHT: 168 LBS | HEIGHT: 65 IN | OXYGEN SATURATION: 95 % | BODY MASS INDEX: 27.99 KG/M2 | HEART RATE: 89 BPM | DIASTOLIC BLOOD PRESSURE: 70 MMHG | SYSTOLIC BLOOD PRESSURE: 108 MMHG

## 2023-12-22 DIAGNOSIS — G47.33 OSA (OBSTRUCTIVE SLEEP APNEA): Primary | ICD-10-CM

## 2023-12-22 PROCEDURE — 93798 PHYS/QHP OP CAR RHAB W/ECG: CPT

## 2023-12-22 PROCEDURE — 99203 OFFICE O/P NEW LOW 30 MIN: CPT

## 2023-12-22 ASSESSMENT — SLEEP AND FATIGUE QUESTIONNAIRES
HOW LIKELY ARE YOU TO NOD OFF OR FALL ASLEEP WHILE SITTING AND READING: WOULD NEVER DOZE
HOW LIKELY ARE YOU TO NOD OFF OR FALL ASLEEP WHILE SITTING AND TALKING TO SOMEONE: SLIGHT CHANCE OF DOZING
HOW LIKELY ARE YOU TO NOD OFF OR FALL ASLEEP WHILE SITTING QUIETLY AFTER LUNCH WITHOUT ALCOHOL: WOULD NEVER DOZE
HOW LIKELY ARE YOU TO NOD OFF OR FALL ASLEEP WHILE SITTING INACTIVE IN A PUBLIC PLACE: SLIGHT CHANCE OF DOZING
HOW LIKELY ARE YOU TO NOD OFF OR FALL ASLEEP WHILE WATCHING TV: HIGH CHANCE OF DOZING
HOW LIKELY ARE YOU TO NOD OFF OR FALL ASLEEP WHILE LYING DOWN TO REST IN THE AFTERNOON WHEN CIRCUMSTANCES PERMIT: WOULD NEVER DOZE
HOW LIKELY ARE YOU TO NOD OFF OR FALL ASLEEP IN A CAR, WHILE STOPPED FOR A FEW MINUTES IN TRAFFIC: WOULD NEVER DOZE
HOW LIKELY ARE YOU TO NOD OFF OR FALL ASLEEP WHEN YOU ARE A PASSENGER IN A CAR FOR AN HOUR WITHOUT A BREAK: WOULD NEVER DOZE

## 2023-12-22 NOTE — PROGRESS NOTES
Outpatient Sleep Medicine Consultation:      Name: Royce Feliz MRN# [id number]   Age: 73 year old YOB: 1950     Date of Consultation: December 22, 2023  Consultation is requested by: Luis Alfredo Bolanos MD  [address] [facility] [address]  [place] [address] Luis Alfredo Bolanos  Primary care provider: Luis Alfredo Bolanos       Reason for Sleep Consult:     Royce Feliz is sent by Luis Alfredo Bolanos for a sleep consultation regarding previously diagnosed EDMAR.    Patient s Reason for visit  Royce Feliz main reason for visit: to get a new see pap machine which i had sense 2009  Patient states problem(s) started: 10years ago  Royce Feliz's goals for this visit: get a new c pap machine           Assessment and Plan:     Summary Sleep Diagnoses:  (G47.33) EDMAR (obstructive sleep apnea) (primary encounter diagnosis)  Comment: Royce is a 73-year-old male who presents to the sleep clinic accompanied by his son to establish care for previously diagnosed EDMAR. He is in need of a new CPAP machine urgently as he just had a CABG and his old machine is no longer working. He has been using his oral appliance in the meantime. He was diagnosed with severe apnea in 2009. He currently has a REMstar Plus that appears to be set at 9 cmH2O. We cannot get a download from his machine. He says he was tolerating the CPAP pressure and using his machine regularly. He feels more rested with CPAP use/oral appliance therapy. He uses a nasal pillows mask. Royce says no snoring is noted with CPAP. He does not have difficulty initiating or maintaining sleep. No purposeful naps and rare unintentional dozing. No morning headaches, restless legs, or unusual nocturnal behavior.   Plan: Comprehensive DME  An order was placed for a replacement machine set as CPAP 9 cmH2O. We are working on obtaining a copy of his sleep study. If we cannot obtain a copy, he will complete a home sleep study. We reviewed compliance goals. In the meantime, he will  continue to use his oral appliance.     Comorbid Diagnoses:  CAD S/P CABG, chronic kidney disease stage 3a    Summary Recommendations:  Orders Placed This Encounter   Procedures    Comprehensive DME     Summary Counseling:    Sleep Testing Reviewed  Obstructive Sleep Apnea Reviewed  Complications of Untreated Sleep Apnea Reviewed    Patient will follow up in approximately 3 months to document compliance with his new machine.   JORGE LUIS Alberto CNP    Total time spent reviewing medical records, history and physical examination, review of previous testing and interpretation as well as documentation on this date: 39 minutes    CC: Luis Alfredo Bolanos MD          History of Present Illness:     Royce presents to the sleep clinic to establish care for previously diagnosed EDMAR. He is in need of a new CPAP machine urgently as he just had a CABG and his old machine is no longer working.     He currently has a REMstar Plus that appears to be set at 9 cmH2O. We cannot get a download from his machine. He says he was tolerating the CPAP pressure. He uses a nasal pillows mask. He is currently using his oral appliance.    Past Sleep Evaluations: Sleep study in 2009 with an AHI of 42 events/hr. O2 rasheed of 77%. He was titrated to 9 cmH2O and all snoring and disordered breathing events were eliminated.      DME: He had been getting supplies at [employer] in [place], [place]. He is going to transfer to [facility].      SLEEP-WAKE SCHEDULE:     Work/School Days: Patient goes to school/work: Yes   Usually gets into bed at 1030  Takes patient about 5 min to fall asleep  Has trouble falling asleep never nights per week  Wakes up in the middle of the night   times.  Wakes up due to Use the bathroom  He has trouble falling back asleep   times a week.   It usually takes   to get back to sleep  Patient is usually up at 6am  Uses alarm: No    Weekends/Non-work Days/All Other Days:  Usually gets into bed at 1030   Takes patient about   to fall  asleep  Patient is usually up at    Uses alarm:      Sleep Need  Patient gets 7 sleep on average   Patient thinks he needs about 7 sleep    Royce Feliz prefers to sleep in this position(s): Back;Side   Patient states they do the following activities in bed:      Naps  Patient takes a purposeful nap never times a week and naps are usually   in duration  He feels better after a nap:    He dozes off unintentionally   days per week. He does not really unintentionally doze.   Patient has had a driving accident or near-miss due to sleepiness/drowsiness: No    SLEEP DISRUPTIONS:    Breathing/Snoring  Patient snores: Yes, not with CPAP or his oral appliance.   Other people complain about his snoring: Yes, not with CPAP or his oral appliance.   Patient has been told he stops breathing in his sleep:    He has issues with the following: Denies morning headaches.     Movement:  Patient gets pain, discomfort, with an urge to move: No Denies restless legs.   It happens when he is resting: No  It happens more at night: No  Patient has been told he kicks his legs at night: No     Behaviors in Sleep:  Royce Feliz has experienced the following behaviors while sleeping:    He has experienced sudden muscle weakness during the day: No  Pt denies bruxism, sleep talking, sleep walking, and dream enactment behavior. Pt denies sleep paralysis, hypnagogue and cataplexy.    Is there anything else you would like your sleep provider to know:      CAFFEINE AND OTHER SUBSTANCES:    Patient consumes caffeinated beverages per day: 1 cup of coffee  Last caffeine use is usually: 7  List of any prescribed or over the counter stimulants that patient takes:  None  List of any prescribed or over the counter sleep medication patient takes: None  List of previous sleep medications that patient has tried: Advil or Tylenol PM  Patient drinks alcohol to help them sleep: No  Patient drinks alcohol near bedtime: Yes    Family History:  Patient has  a family member been diagnosed with a sleep disorder: Yes  [facility]     Social History: He lives with his wife. Plans to go to [place] on January. He feels like he has a high level of stress in his life. 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       SCALES:    EPWORTH SLEEPINESS SCALE         12/22/2023     8:16 AM    Gallitzin Sleepiness Scale ( SARAH Fiore  [id number]<br>ESS - [place]/English - Final version - 21 Nov 07 - [facility] [place].)   Sitting and reading Would never doze   Watching TV High chance of dozing   Sitting, inactive in a public place (e.g. a theatre or a meeting) Slight chance of dozing   As a passenger in a car for an hour without a break Would never doze   Lying down to rest in the afternoon when circumstances permit Would never doze   Sitting and talking to someone Slight chance of dozing   Sitting quietly after a lunch without alcohol Would never doze   In a car, while stopped for a few minutes in traffic Would never doze   Gallitzin Score (MC) 5   Gallitzin Score (Sleep) 5         INSOMNIA SEVERITY INDEX (CHI)          12/22/2023     7:35 AM   Insomnia Severity Index (CHI)   Difficulty falling asleep 0   Difficulty staying asleep 1   Problems waking up too early 1   How SATISFIED/DISSATISFIED are you with your CURRENT sleep pattern? 2   How NOTICEABLE to others do you think your sleep problem is in terms of impairing the quality of your life? 2   How WORRIED/DISTRESSED are you about your current sleep problem? 2   To what extent do you consider your sleep problem to INTERFERE with your daily functioning (e.g. daytime fatigue, mood, ability to function at work/daily chores, concentration, memory, mood, etc.) CURRENTLY? 1   CHI Total Score 9       Guidelines for Scoring/Interpretation:  Total score categories:  0-7 = No clinically significant insomnia   8-14 = Subthreshold insomnia   15-21 = Clinical insomnia (moderate severity)  22-28 = Clinical insomnia  "(severe)  Used via courtesy of www.myhealth.va.gov with permission from Marky Denny PhD., [facility]      STOP BANG         12/22/2023     8:17 AM   STOP BANG Questionnaire (  2008, the American Society of Anesthesiologists, Inc. [employer]   1. Snoring - Do you snore loudly (louder than talking or loud enough to be heard through closed doors)? No   2. Tired - Do you often feel tired, fatigued, or sleepy during daytime? No   3. Observed - Has anyone observed you stop breathing during your sleep? Yes   4. Blood pressure - Do you have or are you being treated for high blood pressure? No   5. BMI - BMI more than 35 kg/m2? No   6. Age - Age over 50 yr old? Yes   7. Neck circumference - Neck circumference greater than 40 cm? Yes   8. Gender - Gender male? Yes   STOP BANG Score (MC): 3 (High risk of EDMAR)         GAD7        9/25/2019    11:50 AM   LEO-7    1. Feeling nervous, anxious, or on edge 0   2. Not being able to stop or control worrying 0         CAGE-AID         No data to display                CAGE-AID reprinted with permission from the [place] Medical Journal, SHANEKA Marie. and FADY Murry, \"Conjoint screening questionnaires for alcohol and drug abuse\" [place] Medical Journal 94: 135-140, 1995.      PATIENT HEALTH QUESTIONNAIRE-9 (PHQ - 9)        11/17/2021     8:07 AM   PHQ-9 ([employer])   1.  Little interest or pleasure in doing things 0   2.  Feeling down, depressed, or hopeless 0   3.  Trouble falling or staying asleep, or sleeping too much 0   4.  Feeling tired or having little energy 0   5.  Poor appetite or overeating 0   6.  Feeling bad about yourself - or that you are a failure or have let yourself or your family down 0   7.  Trouble concentrating on things, such as reading the newspaper or watching television 0   8.  Moving or speaking so slowly that other people could have noticed. Or the opposite - being so fidgety or restless that you have been moving around a " lot more than usual 0   9.  Thoughts that you would be better off dead, or of hurting yourself in some way 0   PHQ-9 Total Score 0   6.  Feeling bad about yourself 0   7.  Trouble concentrating 0   8.  Moving slowly or restless 0   9.  Suicidal or self-harm thoughts 0   1.  Little interest or pleasure in doing things Not at all   2.  Feeling down, depressed, or hopeless Not at all   3.  Trouble falling or staying asleep, or sleeping too much Not at all   4.  Feeling tired or having little energy Not at all   5.  Poor appetite or overeating Not at all   6.  Feeling bad about yourself Not at all   7.  Trouble concentrating Not at all   8.  Moving slowly or restless Not at all   9.  Suicidal or self-harm thoughts Not at all   PHQ-9 via MyChart TOTAL SCORE-----> 0   Difficulty at work, home, or with people Not difficult at all       Developed by Law Bowie, Hermelinda Faustin, Jay Beck and colleagues, with an educational alejandro from [employer]. No permission required to reproduce, translate, display or distribute.        Allergies:    No Known Allergies    Medications:    Current Outpatient Medications   Medication Sig Dispense Refill    acetaminophen (TYLENOL) 500 MG tablet Take 1-2 tablets (500-1,000 mg) by mouth every 6 hours as needed for mild pain      aspirin 81 MG EC tablet Take 1 tablet (81 mg) by mouth daily 100 tablet 3    clopidogrel (PLAVIX) 75 MG tablet Take 1 tablet (75 mg) by mouth daily 30 tablet 11    coenzyme Q10 200 mg capsule [COENZYME Q10 200 MG CAPSULE] Take 200 mg by mouth daily. 90 capsule 3    ezetimibe (ZETIA) 10 MG tablet Take 1 tablet (10 mg) by mouth every evening 90 tablet 3    Metoprolol Tartrate 37.5 MG TABS Take 37.5 mg by mouth 2 times daily 90 tablet 3    Multiple Vitamin (MULTIVITAMIN ADULT PO) Take 1 tablet by mouth daily      OMEGA-3/DHA/EPA/FISH OIL (FISH OIL-OMEGA-3 FATTY ACIDS) 300-1,000 mg capsule Take 2 g by mouth daily      omeprazole (PRILOSEC) 20 MG   capsule TAKE ONE CAPSULE BY MOUTH DAILY 90 capsule 3    senna-docusate (SENOKOT-S/PERICOLACE) 8.6-50 MG tablet Take 1 tablet by mouth 2 times daily 14 tablet 0    sildenafil (VIAGRA) 50 MG tablet [SILDENAFIL (VIAGRA) 50 MG TABLET] TAKE 1 TABLET BY MOUTH 1 HOUR BEFORE NEEDED 18 tablet 3    simvastatin (ZOCOR) 80 MG tablet TAKE 1 TABLET BY MOUTH DAILY 90 tablet 2       Problem List:  Patient Active Problem List    Diagnosis Date Noted    Status post coronary angiogram 11/22/2023     Priority: Medium    Abnormal cardiac CT angiography 11/22/2023     Priority: Medium    Abnormal findings on diagnostic imaging of heart and coronary circulation 11/22/2023     Priority: Medium    Coronary artery disease involving native coronary artery of native heart with angina pectoris (H24) 11/22/2023     Priority: Medium    Coronary artery disease due to calcified coronary lesion 11/01/2023     Priority: Medium    Precordial pain 11/01/2023     Priority: Medium    Chronic kidney disease, stage 3a (H) 06/09/2022     Priority: Medium    Malignant melanoma of torso excluding breast (H) 11/16/2017     Priority: Medium     Excised in 2015        High cholesterol      Priority: Medium    Hypercholesteremia      Priority: Medium     Created by Conversion        Prostatism      Priority: Medium     Created by Conversion            Past Medical/Surgical History:  Past Medical History:   Diagnosis Date    Heart disease     Hypertension     Sleep apnea      Past Surgical History:   Procedure Laterality Date    BIOPSY SKIN (LOCATION)      CORONARY ARTERY BYPASS GRAFT, WITH ENDOSCOPIC VESSEL PROCUREMENT N/A 12/06/2023    Procedure: CORONARY ARTERY BYPASS GRAFT TIMES FOUR , LEFT INTERNAL MAMMARY ARTERY HARVEST, LEFT SAPHENOUS ENDOSCOPIC VESSEL PROCUREMENT,TRANSESOPHAGEAL ECHOCARDIOGRAM , EPI AORTIC ULTRASOUND;  Surgeon: Rene Schwab MD;  Location: [facility] Main OR    CV CORONARY ANGIOGRAM N/A 11/22/2023    Procedure: Coronary  Angiogram;  Surgeon: Sanjay Arredondo MD;  Location: [facility] CV    CV PCI N/A 11/22/2023    Procedure: Percutaneous Coronary Intervention;  Surgeon: Sanjay Arredondo MD;  Location: [facility] CV    ENT SURGERY      Some type of nose surgery    EYE SURGERY      TRANSESOPHAGEAL ECHOCARDIOGRAM INTRAOPERATIVE N/A 12/06/2023    Procedure: ANESTHESIA TRANSESOPHAGEAL ECHOCARDIOGRAM;  Surgeon: Rene Schwab MD;  Location: [facility] Main OR       Social History:  Social History     Socioeconomic History    Marital status:      Spouse name: Not on file    Number of children: Not on file    Years of education: Not on file    Highest education level: Not on file   Occupational History    Not on file   Tobacco Use    Smoking status: Never     Passive exposure: Never    Smokeless tobacco: Never   Vaping Use    Vaping Use: Never used   Substance and Sexual Activity    Alcohol use: Yes     Comment: moderate    Drug use: Never    Sexual activity: Not on file   Other Topics Concern    Not on file   Social History Narrative    Not on file     Social Determinants of Health     Financial Resource Strain: Low Risk  (12/20/2023)    Financial Resource Strain     Within the past 12 months, have you or your family members you live with been unable to get utilities (heat, electricity) when it was really needed?: No   Food Insecurity: Low Risk  (12/20/2023)    Food Insecurity     Within the past 12 months, did you worry that your food would run out before you got money to buy more?: No     Within the past 12 months, did the food you bought just not last and you didn t have money to get more?: No   Transportation Needs: Low Risk  (12/20/2023)    Transportation Needs     Within the past 12 months, has lack of transportation kept you from medical appointments, getting your medicines, non-medical meetings or appointments, work, or from getting things that you need?: No   Physical Activity: Not on file  "  Stress: Not on file   Social Connections: Not on file   Interpersonal Safety: Not on file   Housing Stability: Low Risk  (12/20/2023)    Housing Stability     Do you have housing? : Yes     Are you worried about losing your housing?: No       Family History:  Family History   Problem Relation Age of Onset    Cancer Mother     Diabetes Father     Diabetes Brother     Cancer Sister     Varicose Veins Sister     Sleep Apnea Son     Sleep Apnea Son     Sleep Apnea Daughter        Review of Systems:  A complete review of systems reviewed by me is negative with the exeption of what has been mentioned in the history of present illness.  In the last TWO WEEKS have you experienced any of the following symptoms?    Fevers:    Night Sweats:    Weight Gain:    Pain at Night:    Double Vision:    Changes in Vision:    Difficulty Breathing through Nose:    In the last TWO WEEKS have you experienced any of the following symptoms?    Sore Throat in Morning: Yes   Dry Mouth in the Morning: Yes   Shortness of Breath Lying Flat:    Shortness of Breath With Activity:    Awakening with Shortness of Breath:    Increased Cough:    In the last TWO WEEKS have you experienced any of the following symptoms?    Heart Racing at Night: No   Swelling in Feet or Legs: No   Diarrhea at Night:    Heartburn at Night:    Urinating More than Once at Night:    In the last TWO WEEKS have you experienced any of the following symptoms?    Losing Control of Urine at Night: No   Joint Pains at Night: No   Headaches in Morning: No   Weakness in Arms or Legs: No   Depressed Mood: No   Anxiety: No     Physical Examination:  Vitals: /70   Pulse 89   Ht 1.651 m (5' 5\")   Wt 76.2 kg (168 lb)   SpO2 95%   BMI 27.96 kg/m    BMI= Body mass index is 27.96 kg/m .    Neck Cir (cm): 43 cm    GENERAL APPEARANCE: healthy, alert, no distress, and cooperative  EYES: Eyes grossly normal to inspection  NECK: no asymmetry, masses, or scars  RESP: no increased work " "of breathing noted, occasional dry nonproductive cough, no wheeze   NEURO: mentation intact and speech normal  PSYCH: mentation appears normal and affect normal/bright         Data: All pertinent previous laboratory data reviewed     Recent Labs   Lab Test 12/11/23  1151 12/11/23  0748 12/11/23  0428 12/10/23  0755 12/10/23  0449 12/07/23  0359 12/07/23  0358 12/06/23  1312 12/06/23  1232   NA  --   --   --   --   --   --  140  --  141   POTASSIUM  --   --  3.9  --  3.9   < > 4.0  --  4.1  4.1   CHLORIDE  --   --   --   --   --   --  107  --  110*   CO2  --   --   --   --   --   --  25  --  23   ANIONGAP  --   --   --   --   --   --  8  --  8   * 104*  --    < >  --    < > 134*   < > 195*   BUN  --   --   --   --   --   --  15.1  --  18.0   CR  --   --   --   --   --   --  1.05  --  1.12   GADIEL  --   --   --   --   --   --  8.3*  --  8.8    < > = values in this interval not displayed.       Recent Labs   Lab Test 12/10/23  0449 12/07/23  0358   WBC  --  14.3*   RBC  --  3.20*   HGB  --  10.0*   HCT  --  30.6*   MCV  --  96   MCH  --  31.3   MCHC  --  32.7   RDW  --  14.1    140*       Recent Labs   Lab Test 12/07/23 0358   PROTTOTAL 4.8*   ALBUMIN 3.1*   BILITOTAL 0.4   ALKPHOS 40   AST 57*   ALT 20       No results found for: \"TSH\"    No results found for: \"UAMP\", \"UBARB\", \"BENZODIAZEUR\", \"UCANN\", \"UCOC\", \"OPIT\", \"UPCP\"    No results found for: \"IRONSAT\", \"[id number]\", \"PILLO\"    pH Arterial (no units)   Date Value   12/07/2023 7.39   12/06/2023 7.33 (L)     pH (no units)   Date Value   12/11/2023 7.46 (H)   12/10/2023 7.45     pO2 Arterial (mm Hg)   Date Value   12/07/2023 104 (H)   12/06/2023 123 (H)     pO2 Arterial POCT (mm Hg)   Date Value   12/06/2023 148 (H)   12/06/2023 307 (H)     pCO2 Arterial (mm Hg)   Date Value   12/07/2023 41   12/06/2023 41     pCO2 Arterial POCT (mm Hg)   Date Value   12/06/2023 48 (H)   12/06/2023 46 (H)     Bicarbonate Arterial (mmol/L)   Date Value   12/07/2023 25 "   12/06/2023 22 (L)     Bicarbonate Arterial POCT (mmol/L)   Date Value   12/06/2023 24   12/06/2023 25     Base Excess/Deficit (mmol/L)   Date Value   12/07/2023 0.0   12/06/2023 -4.4     Base Excess/Deficit (+/-) POCT (mmol/L)   Date Value   12/06/2023 0.1   12/06/2023 1.0       @LABRCNTIPR(phv:4,pco2v:4,po2v:4,hco3v:4,sergei:4,o2per:4)@    Echocardiology: No results found for this or any previous visit (from the past 4320 hour(s)).    Chest x-ray:   XR CHEST 2 VIEWS 12/09/2023    Narrative  EXAM: XR CHEST 2 VIEWS  LOCATION: [facility]  DATE: 12/9/2023    INDICATION: s p ct removal  COMPARISON: 12/06/2023    Impression  IMPRESSION: Bilateral chest tubes have been removed. No significant pneumothorax or pleural effusions. Linear atelectasis in the lung bases. Median sternotomy and mediastinal surgical clips.      Chest CT: No results found for this or any previous visit from the past 365 days.      PFT: Most Recent [facility] Pulmonary Function Testing    JORGE LUIS Alberto CNP 12/22/2023

## 2023-12-27 ENCOUNTER — HOSPITAL ENCOUNTER (OUTPATIENT)
Dept: CARDIAC REHAB | Facility: HOSPITAL | Age: 73
Discharge: HOME | End: 2023-12-27
Attending: INTERNAL MEDICINE
Payer: COMMERCIAL

## 2023-12-27 PROCEDURE — 93798 PHYS/QHP OP CAR RHAB W/ECG: CPT

## 2023-12-28 ENCOUNTER — TELEPHONE (OUTPATIENT)
Dept: FAMILY MEDICINE | Facility: CLINIC | Age: 73
End: 2023-12-28
Payer: COMMERCIAL

## 2023-12-28 NOTE — TELEPHONE ENCOUNTER
General Call    Contacts         Type Contact Phone/Fax    12/28/2023 11:26 AM CST Phone (Incoming) Royce Feliz (Self) [phone] (H)          Reason for Call:  CPAP & Sleep Study    What are your questions or concerns:  PT states that he was in last Friday and was working with Jaylan Reyes and her assistant to get the records from pt sleep study from [facility]. Pt states they told him they should have it by Monday or Friday. PT has not heard anything. Pt needs the records to be able to get his CPAP machine and it is urgent he gets a machine. Pt states if they aren't able to get the records, he would need to have a sleep study done with [facility]. Can someone call patient and update him on where they are at in the process?     Date of last appointment with provider: 12/22/2023    Could we send this information to you in [employer] or would you prefer to receive a phone call?:   Patient would prefer a phone call   Okay to leave a detailed message?: Yes at Home number on file [phone] (home)

## 2023-12-29 ENCOUNTER — HOSPITAL ENCOUNTER (OUTPATIENT)
Dept: CARDIAC REHAB | Facility: HOSPITAL | Age: 73
Discharge: HOME | End: 2023-12-29
Attending: INTERNAL MEDICINE
Payer: COMMERCIAL

## 2023-12-29 PROCEDURE — 93798 PHYS/QHP OP CAR RHAB W/ECG: CPT

## 2024-01-02 ENCOUNTER — OFFICE VISIT (OUTPATIENT)
Dept: CARDIOLOGY | Facility: CLINIC | Age: 74
End: 2024-01-02
Payer: COMMERCIAL

## 2024-01-02 VITALS
RESPIRATION RATE: 16 BRPM | HEART RATE: 97 BPM | WEIGHT: 170.8 LBS | OXYGEN SATURATION: 99 % | BODY MASS INDEX: 28.42 KG/M2 | SYSTOLIC BLOOD PRESSURE: 111 MMHG | DIASTOLIC BLOOD PRESSURE: 76 MMHG

## 2024-01-02 DIAGNOSIS — Z95.1 S/P CABG X 4: Primary | ICD-10-CM

## 2024-01-02 PROCEDURE — 99024 POSTOP FOLLOW-UP VISIT: CPT

## 2024-01-02 NOTE — PROGRESS NOTES
CARDIOTHORACIC SURGERY FOLLOW-UP VISIT     Royce Feliz   1950   [id number]      Reason for visit: Post operative clinic visit. Patient underwent coronary artery bypass grafting x 4 with Dr. Schwab on 12/6/23.    HPI: Royce Feliz is a 73 year old year old male seen in clinic for a routine follow-up appointment after surgery. Patient has past medical history as below. Hospital course was uneventful. Patient was discharged to home.    Patient has been doing well since discharge. Patient did follow-up with primary care since leaving the hospital. Reports that incision is healing well. Denies fevers, peripheral edema. Appetite is improving and patient is voiding without difficulty. Weight has been stable. Pain management at this point with tylenol. Patient has been attending cardiac rehab and this is going well. Patient is on Plavix for 1 year per surgeon preference.    PAST MEDICAL HISTORY:  Past Medical History:   Diagnosis Date    Heart disease     Hypertension     Sleep apnea        PAST SURGICAL HISTORY:  Past Surgical History:   Procedure Laterality Date    BIOPSY SKIN (LOCATION)      CORONARY ARTERY BYPASS GRAFT, WITH ENDOSCOPIC VESSEL PROCUREMENT N/A 12/06/2023    Procedure: CORONARY ARTERY BYPASS GRAFT TIMES FOUR , LEFT INTERNAL MAMMARY ARTERY HARVEST, LEFT SAPHENOUS ENDOSCOPIC VESSEL PROCUREMENT,TRANSESOPHAGEAL ECHOCARDIOGRAM , EPI AORTIC ULTRASOUND;  Surgeon: Rene Schwab MD;  Location: [facility] Main OR    CV CORONARY ANGIOGRAM N/A 11/22/2023    Procedure: Coronary Angiogram;  Surgeon: Sanjay Arredondo MD;  Location: [facility] LAB CV    CV PCI N/A 11/22/2023    Procedure: Percutaneous Coronary Intervention;  Surgeon: Sanjay Arredondo MD;  Location: [facility] CV    ENT SURGERY      Some type of nose surgery    EYE SURGERY      TRANSESOPHAGEAL ECHOCARDIOGRAM INTRAOPERATIVE N/A 12/06/2023    Procedure: ANESTHESIA TRANSESOPHAGEAL ECHOCARDIOGRAM;  Surgeon:  Rene Schwab MD;  Location: [facility] Main OR       CURRENT MEDICATIONS:     Current Outpatient Medications:     acetaminophen (TYLENOL) 500 MG tablet, Take 1-2 tablets (500-1,000 mg) by mouth every 6 hours as needed for mild pain, Disp: , Rfl:     aspirin 81 MG EC tablet, Take 1 tablet (81 mg) by mouth daily, Disp: 100 tablet, Rfl: 3    clopidogrel (PLAVIX) 75 MG tablet, Take 1 tablet (75 mg) by mouth daily, Disp: 30 tablet, Rfl: 11    coenzyme Q10 200 mg capsule, [COENZYME Q10 200 MG CAPSULE] Take 200 mg by mouth daily., Disp: 90 capsule, Rfl: 3    ezetimibe (ZETIA) 10 MG tablet, Take 1 tablet (10 mg) by mouth every evening, Disp: 90 tablet, Rfl: 3    Metoprolol Tartrate 37.5 MG TABS, Take 37.5 mg by mouth 2 times daily, Disp: 90 tablet, Rfl: 3    Multiple Vitamin (MULTIVITAMIN ADULT PO), Take 1 tablet by mouth daily, Disp: , Rfl:     OMEGA-3/DHA/EPA/FISH OIL (FISH OIL-OMEGA-3 FATTY ACIDS) 300-1,000 mg capsule, Take 2 g by mouth daily, Disp: , Rfl:     omeprazole (PRILOSEC) 20 MG DR capsule, TAKE ONE CAPSULE BY MOUTH DAILY, Disp: 90 capsule, Rfl: 3    simvastatin (ZOCOR) 80 MG tablet, TAKE 1 TABLET BY MOUTH DAILY, Disp: 90 tablet, Rfl: 2    senna-docusate (SENOKOT-S/PERICOLACE) 8.6-50 MG tablet, Take 1 tablet by mouth 2 times daily (Patient not taking: Reported on 1/2/2024), Disp: 14 tablet, Rfl: 0    sildenafil (VIAGRA) 50 MG tablet, [SILDENAFIL (VIAGRA) 50 MG TABLET] TAKE 1 TABLET BY MOUTH 1 HOUR BEFORE NEEDED (Patient not taking: Reported on 1/2/2024), Disp: 18 tablet, Rfl: 3    ALLERGIES:    No Known Allergies    ROS:  Gen: No fevers, weight loss/gain  CV: Denies chest pain, peripheral edema  Pulm: Denies SOB  GI/: Voiding without problems, appetite improving.     LABS:  None    IMAGING:  None    PHYSICAL EXAM:   /76 (BP Location: Right arm, Patient Position: Sitting, Cuff Size: Adult Large)   Pulse 97   Resp 16   Wt 77.5 kg (170 lb 12.8 oz)   SpO2 99%   BMI 28.42 kg/m    General:  Alert and oriented, pleasant, no acute distress.  CV:  No peripheral edema.  Pulm: Easy work of breathing on room air. He does have a slight cough with some congestion.  GI: Soft, non-tender, and non-distended  Incision: Chest and left leg incisions clean dry and intact without erythema, swelling or drainage  Neuro: CNs grossly intact.      ASSESSMENT/PLAN:  Royce Feliz is a 73 year old year old male status post CAB x 4 who returns to clinic for postop visit.     - Surgically doing well.  Incisions are healing well with no signs of infection. Sternum is stable.  - Hemodynamics are stable. No medication changes were needed today.  - Follow up with your cardiologist as scheduled with Dr Zhou 1/8/24 at 9:20 am  - Continue Cardiac Rehab until completed.   - May start driving  (4 weeks post-op) if not using narcotic pain medications.  - Continue strict sternal precautions until 3/8/24. No lifting >10bs; may gradually increase at this point (6 weeks post-op).   - No need for further follow-up with CV surgery unless concerns. Feel free to call our office with questions. [phone] - ok to return to [place] for the winter         Maggie Myers PA-C  [facility] Cardiothoracic Surgery  Vocera or pager [phone]

## 2024-01-03 ENCOUNTER — HOSPITAL ENCOUNTER (OUTPATIENT)
Dept: CARDIAC REHAB | Facility: HOSPITAL | Age: 74
Discharge: HOME | End: 2024-01-03
Attending: INTERNAL MEDICINE
Payer: COMMERCIAL

## 2024-01-03 ENCOUNTER — TELEPHONE (OUTPATIENT)
Dept: SLEEP MEDICINE | Facility: CLINIC | Age: 74
End: 2024-01-03
Payer: COMMERCIAL

## 2024-01-03 PROCEDURE — 93798 PHYS/QHP OP CAR RHAB W/ECG: CPT

## 2024-01-03 NOTE — TELEPHONE ENCOUNTER
General Call    Contacts         Type Contact Phone/Fax    01/03/2024 11:34 AM CST Phone (Incoming) Royce Feliz (Self) [phone] (M)          Reason for Call:  Has not heard back about prescription for new Cpap    What are your questions or concerns:  Was last seen 12/22 and needs a new cpap, was told his sleep study records would be obtained but he has not heard back since    Date of last appointment with provider: 12/22/2023    Could we send this information to you in [employer] or would you prefer to receive a phone call?:   Patient would prefer a phone call   Okay to leave a detailed message?: Yes at Cell number on file:    Telephone Information:   Mobile [phone]

## 2024-01-04 NOTE — TELEPHONE ENCOUNTER
Sleep clinic has not received study from [place]. Writer spoke with patient and asked he request study and sleep data to be faxed to [phone]    Mary HERRERA RN  [facility]

## 2024-01-05 ENCOUNTER — HOSPITAL ENCOUNTER (OUTPATIENT)
Dept: CARDIAC REHAB | Facility: HOSPITAL | Age: 74
Discharge: HOME | End: 2024-01-05
Attending: INTERNAL MEDICINE
Payer: COMMERCIAL

## 2024-01-05 PROCEDURE — 93798 PHYS/QHP OP CAR RHAB W/ECG: CPT

## 2024-01-08 ENCOUNTER — OFFICE VISIT (OUTPATIENT)
Dept: CARDIOLOGY | Facility: CLINIC | Age: 74
End: 2024-01-08
Payer: COMMERCIAL

## 2024-01-08 VITALS
HEART RATE: 97 BPM | DIASTOLIC BLOOD PRESSURE: 64 MMHG | HEIGHT: 65 IN | SYSTOLIC BLOOD PRESSURE: 102 MMHG | RESPIRATION RATE: 14 BRPM | WEIGHT: 170 LBS | BODY MASS INDEX: 28.32 KG/M2

## 2024-01-08 DIAGNOSIS — Z95.1 S/P CABG (CORONARY ARTERY BYPASS GRAFT): Primary | ICD-10-CM

## 2024-01-08 PROCEDURE — 99214 OFFICE O/P EST MOD 30 MIN: CPT | Performed by: INTERNAL MEDICINE

## 2024-01-08 RX ORDER — CLOPIDOGREL BISULFATE 75 MG/1
75 TABLET ORAL DAILY
Qty: 90 TABLET | Refills: 3 | Status: SHIPPED | OUTPATIENT
Start: 2024-01-08 | End: 2024-08-23

## 2024-01-08 NOTE — PATIENT INSTRUCTIONS
Royce Feliz,    It was a pleasure to see you today at the [facility].     My recommendations after this visit include:    Stop metoprolol    WMario Zhou MD, FACC, ANN         
Statement Selected

## 2024-01-08 NOTE — LETTER
"1/8/2024    Luis Alfredo Bolanos MD  [address]  [facility] [address]    RE: Royce Feliz       Dear Colleague,     I had the pleasure of seeing Royce Feliz in the [facility].      Cardiology Progress Note     Assessment:  Coronary artery disease, multivessel status post coronary artery bypass graft surgery in December 2023(WOOTEN to LAD, 2 vein grafts to distal RCA and vein graft to diagonal branch)-stable, no angina, preserved LV systolic function  Hypercholesterolemia on statin and Zetia    Plan:  Continue cardiac rehab  Wean of metoprolol as he is normal LV systolic function, no arrhythmias and normal blood pressure  He will stay on Plavix for a year after the surgery per surgeon's preference    Follow-up with me in 6 months    Subjective:   This is 73 year old male who comes in today for follow-up visit.  I saw him in November when he was complaining of some exertional shortness of breath.  He has known coronary artery disease previously diagnosed at [facility].  He underwent CT and then invasive coronary angiogram.  It confirmed progression of coronary artery disease.  He underwent surgical revascularization without complications.  He is doing well now.  He denies any incisional chest discomfort.  He is weight is stable.  He has no heart palpitations.  He has no lightheadedness.  He is planning to go to [place] for the next 3 months.    Review of Systems:   Negative other than history of present illness    Objective:   /64 (BP Location: Right arm, Patient Position: Sitting, Cuff Size: Adult Regular)   Pulse 97   Resp 14   Ht 1.651 m (5' 5\")   Wt 77.1 kg (170 lb)   BMI 28.29 kg/m    Physical Exam:  GENERAL: no distress  NECK: No JVD  LUNGS: A few dry crackles at the bases  CARDIAC: regular rhythm, S1 & S2 normal.  No heaves, thrills, gallops or murmurs.  ABDOMEN: flat, negative hepatosplenomegaly, soft and non-tender.  EXTREMITIES: No evidence of cyanosis, clubbing " or edema.    Current Outpatient Medications   Medication Sig Dispense Refill    acetaminophen (TYLENOL) 500 MG tablet Take 1-2 tablets (500-1,000 mg) by mouth every 6 hours as needed for mild pain      aspirin 81 MG EC tablet Take 1 tablet (81 mg) by mouth daily 100 tablet 3    clopidogrel (PLAVIX) 75 MG tablet Take 1 tablet (75 mg) by mouth daily 90 tablet 3    coenzyme Q10 200 mg capsule [COENZYME Q10 200 MG CAPSULE] Take 200 mg by mouth daily. 90 capsule 3    ezetimibe (ZETIA) 10 MG tablet Take 1 tablet (10 mg) by mouth every evening 90 tablet 3    Multiple Vitamin (MULTIVITAMIN ADULT PO) Take 1 tablet by mouth daily      OMEGA-3/DHA/EPA/FISH OIL (FISH OIL-OMEGA-3 FATTY ACIDS) 300-1,000 mg capsule Take 2 g by mouth daily      omeprazole (PRILOSEC) 20 MG DR capsule TAKE ONE CAPSULE BY MOUTH DAILY 90 capsule 3    senna-docusate (SENOKOT-S/PERICOLACE) 8.6-50 MG tablet Take 1 tablet by mouth 2 times daily 14 tablet 0    sildenafil (VIAGRA) 50 MG tablet [SILDENAFIL (VIAGRA) 50 MG TABLET] TAKE 1 TABLET BY MOUTH 1 HOUR BEFORE NEEDED 18 tablet 3    simvastatin (ZOCOR) 80 MG tablet TAKE 1 TABLET BY MOUTH DAILY 90 tablet 2       Cardiographics:    EC2023 read by me normal sinus rhythm within normal limits    Echocardiogram: 2023  1. The left ventricle is normal in size. Left ventricular function is  normal.The ejection fraction is 60-65%. There is normal left ventricular wall  thickness. Left ventricular diastolic function is normal. No regional wall  motion abnormalities noted.  2. Normal right ventricle size and systolic function.  3. No hemodynamically significant valvular abnormalities on 2D or color flow  imaging.    Coronary angio: 2023  1.  Severe calcific multivessel coronary disease  2.  Sequential calcified stenoses proximal to mid LAD, involving takeoff of large first diagonal branch.  3.  Diffuse severe right coronary calcification with moderate stenosis proximal third and severe  "stenosis distally proximal to the crux.  Moderately severe stenosis mid PDA, with a large posterolateral branch that appears widely patent.  4.  Left circumflex is a very small system.  5.  Normal LVEDP   Lab Results    Chemistry/lipid CBC Cardiac Enzymes/BNP/TSH/INR   Recent Labs   Lab Test 11/22/23  0721   CHOL 149   HDL 56   LDL 64   TRIG 145     Recent Labs   Lab Test 11/22/23  0721 08/29/23  0800 12/06/22  1050   LDL 64 60 62     Recent Labs   Lab Test 12/11/23  1151 12/11/23  0748 12/11/23  0428 12/07/23  0359 12/07/23  0358   NA  --   --   --   --  140   POTASSIUM  --   --  3.9   < > 4.0   CHLORIDE  --   --   --   --  107   CO2  --   --   --   --  25   *   < >  --    < > 134*   BUN  --   --   --   --  15.1   CR  --   --   --   --  1.05   GFRESTIMATED  --   --   --   --  74   GADIEL  --   --   --   --  8.3*    < > = values in this interval not displayed.     Recent Labs   Lab Test 12/07/23 0358 12/06/23  1232 12/04/23  0827   CR 1.05 1.12 1.20*     Recent Labs   Lab Test 12/04/23  0827 08/29/23  0800 11/17/21  0904   A1C 5.9* 5.6 5.4          Recent Labs   Lab Test 12/10/23  0449 12/07/23  0358   WBC  --  14.3*   HGB  --  10.0*   HCT  --  30.6*   MCV  --  96    140*     Recent Labs   Lab Test 12/07/23  0358 12/06/23  1232 12/06/23  1119   HGB 10.0* 11.5* 11.1*  11.7*    No results for input(s): \"TROPONINI\" in the last 91208 hours.  No results for input(s): \"BNP\", \"NTBNPI\", \"NTBNP\" in the last 27562 hours.  No results for input(s): \"TSH\" in the last 83653 hours.  Recent Labs   Lab Test 12/06/23  1232 12/06/23  1119 12/04/23  0827   INR 1.17* 1.33* 0.93                         Thank you for allowing me to participate in the care of your patient.      Sincerely,     Iván Zhou MD     [facility]  cc:   No referring provider defined for this encounter.      "

## 2024-01-10 ENCOUNTER — HOSPITAL ENCOUNTER (OUTPATIENT)
Dept: CARDIAC REHAB | Facility: HOSPITAL | Age: 74
Discharge: HOME | End: 2024-01-10
Attending: INTERNAL MEDICINE
Payer: COMMERCIAL

## 2024-01-10 PROCEDURE — 93798 PHYS/QHP OP CAR RHAB W/ECG: CPT

## 2024-01-12 ENCOUNTER — HOSPITAL ENCOUNTER (OUTPATIENT)
Dept: CARDIAC REHAB | Facility: HOSPITAL | Age: 74
Discharge: HOME | End: 2024-01-12
Attending: INTERNAL MEDICINE
Payer: COMMERCIAL

## 2024-01-12 PROCEDURE — 93798 PHYS/QHP OP CAR RHAB W/ECG: CPT

## 2024-01-15 ENCOUNTER — HOSPITAL ENCOUNTER (OUTPATIENT)
Dept: CARDIAC REHAB | Facility: HOSPITAL | Age: 74
Discharge: HOME | End: 2024-01-15
Attending: INTERNAL MEDICINE
Payer: COMMERCIAL

## 2024-01-15 PROCEDURE — 93798 PHYS/QHP OP CAR RHAB W/ECG: CPT

## 2024-01-15 PROCEDURE — 93797 PHYS/QHP OP CAR RHAB WO ECG: CPT

## 2024-02-09 DIAGNOSIS — Z00.00 ROUTINE GENERAL MEDICAL EXAMINATION AT A HEALTH CARE FACILITY: ICD-10-CM

## 2024-02-09 DIAGNOSIS — E78.00 HYPERCHOLESTEREMIA: ICD-10-CM

## 2024-02-09 DIAGNOSIS — N52.8 OTHER MALE ERECTILE DYSFUNCTION: Primary | ICD-10-CM

## 2024-02-09 RX ORDER — SILDENAFIL 50 MG/1
TABLET, FILM COATED ORAL
Qty: 18 TABLET | Refills: 3 | Status: SHIPPED | OUTPATIENT
Start: 2024-02-09

## 2024-04-29 NOTE — PROGRESS NOTES
2024       Inder Kevin MD  [address]  [address]  [facility] [address]  Via Fax: [phone]      Patient: Yoav Rudolph   YOB: 1968   Date of Visit: 2024       Dear Dr. Kevin:    Thank you for referring Yoav Rudolph to me for evaluation. Below are my notes for this visit with him.    If you have questions, please do not hesitate to call me. I look forward to following your patient along with you.      Sincerely,        Magan Apodaca MD        CC: No Recipients  Magan Apodaca MD  2024 10:02 AM  Signed  [facility]  [address], [place] [address]  P [phone]  F [phone]        PROGRESS NOTE - CARDIOLOGY    PATIENT:  Yoav Rudolph   : 1968    CHIEF COMPLAINT  Office Visit and Follow-up     Referring Physician: Inder Kevin MD       HISTORY OF PRESENT ILLNESS  Mr. Rudolph is a 55 year old male with admission in 2020 for decompensated heart failure found to have severe mitral regurgitation now status post mitral valve repair with #34 mm St Gurpreet annuloplasty ring as well as left atrial appendage ligation on 2020.  Brief A. fib postop discharged on amiodarone.  Amiodarone was discontinued after an event monitor failed to reveal further atrial fibrillation (10/2020).    Last seen by me on 23 by me for cardiovascular follow up    Echo from 24 showed mean gradient of 6 mmHg which is unchanged from prior echocardiogram 2023    He is here for routine follow-up visit.  He has no new complaints.  He is staying active.  No chest pain or pressure with activity.  He is walking regularly at lunch.  No change exercise capacity.  No syncope.  No blood work done recently.        MEDICATIONS  Prior to Admission medications    Medication Sig Start Date End Date Taking? Authorizing Provider   Pseudoephedrine-Ibuprofen (ADVIL COLD & SINUS LIQUI-GELS PO)    Yes Provider, Outside   aspirin 81 MG EC tablet Take 1    CT EXTUBATION FAST TRACK:    [facility] - ICU     FastTrack Candidate: N/A     Patient Status: Remains Intubated Wean Ongoing        Patient goal is 1808.  Currently doing well on wean, just gags on the ET tube a bit.     tablet by mouth daily. 21  Yes Magan Apodaca MD     The patient's current medications were reviewed.    ALLERGIES  ALLERGIES:   Allergen Reactions   • Seasonal Runny Nose and Other (See Comments)     nssal congestion        HISTORIES  Past Medical History:   Diagnosis Date   • Diastolic heart failure (CMD)     2/2 fail MV leaflet from severe MR normal EF plan for repair  Dr. Hernandez normal LHC    • Elevated LFTs     due to hepatic congestion from CHF + ETOH , u/s abd with fatty liver   • ETOH abuse    • Mitral insufficiency     flail posterior leaflet resulting in severe MR, normal EF confirmed by OTTONIEL  presented with CHF normal LHC, plan for repair 20 Dr. Hernandez       History reviewed. No pertinent surgical history.    FAMILY HISTORY  Family History   Problem Relation Age of Onset   • Myocardial Infarction Paternal Uncle    • Myocardial Infarction Paternal Uncle    • Patient is unaware of any medical problems Mother    • Patient is unaware of any medical problems Father        SOCIAL HISTORY  Social History     Tobacco Use   • Smoking status: Former     Current packs/day: 0.00     Average packs/day: 0.3 packs/day for 5.0 years (1.3 ttl pk-yrs)     Types: Cigarettes     Start date:      Quit date: 2020     Years since quittin.3   • Smokeless tobacco: Never   Vaping Use   • Vaping status: never used   Substance Use Topics   • Alcohol use: Yes     Comment: occ   • Drug use: Never        REVIEW OF SYSTEMS    Constitutional: Negative for fatigue.   HENT: Negative for congestion and ear discharge.    Eyes: Negative for discharge.   Respiratory: No shortness of breath  Cardiovascular: Negative for chest pain.  Gastrointestinal: Negative for abdominal distention, nausea or vomiting.   Endocrine: Negative for polyphagia.   Genitourinary: Negative for hematuria.   Musculoskeletal: Negative for arthralgias.   Skin: Negative for color change.   Allergic/Immunologic: Negative for environmental  allergies.   Neurological: Negative for seizures.   Hematological: Does not bruise/bleed easily.   Psychiatric/Behavioral: Negative for behavioral problems.     PHYSICAL EXAM  Visit Vitals  /87 (BP Location: LUE - Left upper extremity)   Pulse 78   Temp 97.3 °F (36.3 °C)   Resp 16   Ht 5' 11\" (1.803 m)   Wt 87.9 kg (193 lb 12.8 oz)   SpO2 97%   BMI 27.03 kg/m²       Constitutional: Appears well-developed and well-nourished.   Head: Normocephalic.   Mouth/Throat: Mucous membranes are normal.   Eyes: Conjunctivae are normal. Pupils are equal, round, and reactive to light.   Neck: Normal range of motion. Neck supple. No JVD present. Carotid bruit is not present.   Cardiovascular: RRR nl S1S2, III/VI systolic murmur at the apex radiates to the axilla can be heard posteriorly as well, no r/g, no LE edema, no JVD  Pulmonary/Chest: Effort normal and breath sounds normal. No respiratory distress. No wheezes, rhonchi, rales, no tenderness.   Abdominal: Soft. Normal appearance and bowel sounds are normal. There is no tenderness.   Musculoskeletal: Normal range of motion. No joint swelling   Neurological: Grossly normal. Alert and oriented to person, place, and time.   Skin: Skin is warm, dry and intact. No rash noted. No erythema.   Psychiatric: Normal mood and affect. Behavior is normal.     LABS  No results found for: \"HGBA1C\"    Cholesterol (mg/dL)   Date Value   08/16/2020 183   08/16/2020 183     HDL (mg/dL)   Date Value   08/16/2020 82 (H)   08/16/2020 82 (H)     TRIGLYCERIDE (mg/dL)   Date Value   08/16/2020 69   08/16/2020 69     CALCULATED LDL (mg/dL)   Date Value   08/16/2020 87   08/16/2020 87        CARDIAC TESTING/IMAGING  I have reviewed the pertinent imaging study reports. These are the pertinent findings:  ECG performed and personally reviewed today -03/10/2021   TTE - 4/2024: Left ventricle: The cavity size is normal. Wall thickness is normal. Systolic function is normal. The ejection fraction was  measured by biplane method of disks. The ejection fraction is 60%. Mitral valve: A 34mm St Gurpreet annuloplasty ringis present. Prior procedures include surgical repair. The sewing ring appears normal. There is no regurgitation. The mean diastolic gradient is 6mm Hg. Right ventricle: The cavity size is normal. Wall thickness is normal. Systolic function is normal. Systolic pressure is within the normal range.  The estimated peak pressure is 19mm Hg. Tricuspid valve: There is trace regurgitation  4/2023:  1. Left ventricle: The cavity size is normal. Wall thickness is at the upper limits of normal. Systolic function is normal. The ejection fraction was measured by biplane method of disks. The ejection fraction is 60%. 2. Mitral valve: The sewing ring appears normal. The mean diastolic gradient is 6mm Hg. 3. Right ventricle: The cavity size is normal. Wall thickness is normal. Systolic function is normal. IMPRESSIONS: No significant change from 2022.   4/2022: 1. Left ventricle: The cavity size is normal. Wall thickness is normal. Doppler parameters are consistent with abnormal left ventricular relaxation (grade 1 diastolic dysfunction). The ejection fraction is 73%. 2. Mitral valve: There is an annuloplasty ring. The mean diastolic gradient is 6mm Hg. 3. Left atrium: The atrium is mildly dilated.  March 15, 2021: EF 55% Wall motion abnormalities are possible with HK apical inferior wall. Grade 1 DD. Mild/mod LAE.  Moderate mitral stenosis mean gradient 7 mmHg (findings similar to previous echo Jan 2021 reviewed by Dr Apodaca)  Jan 2021: Nml LV size. EF 55%. LA dilated. MV -The sewing ring appears normal. The mima   flets are mildly thickened. Mitral valve appearance has changed in comparison with the previous study. Mobility is restricted. Posterior leaflet restriction post   repair. Mild turbulent flow across the valve.  Doppler:  Transvalvular velocity is increased. The findings are consistent with mild to moderate   stenosis.  No significant regurgitation 10/2020: Nml LV size and wall thickness. EF is 55%. Prior procedures include surgical repair. There is an annuloplasty ring. The sewing ring appears normal. Posterior leaflet restriction post repair. Mild turbulent flow across the valve. The pressure half-time is 78ms. The mean diastolic gradient is 7mm Hg in mitral valve.  LA is dilated.  8/15/20: Upper limits of normal left ventricular size with normal function. Likely flail posterior mitral valve leaflet with resulting eccentric mitral regurgitation. The degree of MR is very underrepresented on this study. Severely dilated left atrium. OTTONIEL to follow. Suspect severe MR will be noted.  8/17/20: (OTTONIEL) Normal left ventricular size and systolic function with a normal estimated ejection fraction 60-65%. Flail posterior mitral valve leaflet as described above with severe mitral regurgitation. No evidence of interatrial shunting. No evidence of intracavitary thrombus.  Coronary angiography -   8/17/20: Left main coronary artery is a short vessel and bifurcates into the LAD and circumflex.  Angiographically within normal limits. Left anterior descending coronary artery: Large caliber vessel, angiographically free of significant disease.  There are 2 diagonal branches free of disease. Left circumflex coronary artery: Large caliber vessel, angiographically free of significant disease.  Main runoff through the OM branch that is free of disease. Right coronary artery is a large caliber vessel, dominant, angiographically free of significant disease.  Right heart catheterization -   8/17/20: IVC 11 mmHg, RA 7 mmHg, RV 25/5 mmHg, PA 24/16 mmHg, PAWP 16 mmHg, Ao 109/75 mmHg, /2 mmHg, LVEDP 10 mmHg, SATURATION: Ao 95%, PA 68%. HEMODYNAMICS: VALENTIN CO 3.9 l/min; CI 2 l/min/m2 TD CO 4.7 l/min/; CI 2.3 l/min/m2 PVR 0.8 DURAN (0.25-1.6);   SVR 21 DURAN (9-20);     Carotid ultrasound -   8/14/20: No hemodynamically significant stenosis in either  internal carotid artery.  Mild atherosclerotic disease.  Abdomen ultrasound -   8/14/20: Fatty infiltration of the liver. No suspicious hepatic mass. No bile duct dilatation. No calcified gallstones in the gallbladder.   Event monitor-   10/2020: No evidence of afib. NSR.     ASSESSMENT/PLAN  Mitral regurgitation status post mitral valve repair 8/28/2020 with 34 mm Saint Gurpreet annuloplasty ring placement and left atrial appendage ligation  Echocardiogram in 4/2024 shows mean gradient of 6 mmHg (prior echos showed mean gradient of 6-7 mmHg).  No significant regurgitation.  The patient understands that he will need antibiotic prophylaxis for dental procedures.    A. fib  Brief A. fib noted postop.  Discharged on amiodarone which is subsequently been discontinued and there is been no recurrence of atrial fibrillation reported or noted on follow-up event monitor  from 10/2020.  on aspirin 81 mg daily.  Of note he is status post left atrial appendage ligation.    History of diastolic heart failure  In the setting of severe MR. No recurrence after mitral valve repair.  Is Euvolemic.      Orders Placed This Encounter   • Comprehensive Metabolic Panel   • Lipid Panel With Reflex   • Glycohemoglobin     There are no discontinued medications.  Return in about 1 year (around 4/29/2025).    Magan Apodaca MD  [facility] [place]   [facility]    This note was created by [employer] voice recognition. Errors in content may be related to improper recognition by the system; efforts to review and correct have been done but errors may still exist.  Plan of care discussed with the patient.  All questions were answered.  Patient verbalized understanding and agrees with the plan.    A letter has been sent to referring physician.

## 2024-05-02 ENCOUNTER — OFFICE VISIT (OUTPATIENT)
Dept: FAMILY MEDICINE | Facility: CLINIC | Age: 74
End: 2024-05-02
Payer: COMMERCIAL

## 2024-05-02 VITALS
BODY MASS INDEX: 29.32 KG/M2 | RESPIRATION RATE: 18 BRPM | SYSTOLIC BLOOD PRESSURE: 118 MMHG | HEIGHT: 65 IN | TEMPERATURE: 98.6 F | OXYGEN SATURATION: 98 % | WEIGHT: 176 LBS | HEART RATE: 70 BPM | DIASTOLIC BLOOD PRESSURE: 68 MMHG

## 2024-05-02 DIAGNOSIS — I25.810 CORONARY ARTERY DISEASE INVOLVING AUTOLOGOUS ARTERY CORONARY BYPASS GRAFT WITHOUT ANGINA PECTORIS: Primary | ICD-10-CM

## 2024-05-02 DIAGNOSIS — N48.6 PEYRONIE DISEASE: ICD-10-CM

## 2024-05-02 PROBLEM — N18.31 CHRONIC KIDNEY DISEASE, STAGE 3A (H): Status: RESOLVED | Noted: 2022-06-09 | Resolved: 2024-05-02

## 2024-05-02 LAB
ERYTHROCYTE [DISTWIDTH] IN BLOOD BY AUTOMATED COUNT: 14.9 % (ref 10–15)
HCT VFR BLD AUTO: 41.6 % (ref 40–53)
HGB BLD-MCNC: 13.6 G/DL (ref 13.3–17.7)
MCH RBC QN AUTO: 30.3 PG (ref 26.5–33)
MCHC RBC AUTO-ENTMCNC: 32.7 G/DL (ref 31.5–36.5)
MCV RBC AUTO: 93 FL (ref 78–100)
PLATELET # BLD AUTO: 178 10E3/UL (ref 150–450)
RBC # BLD AUTO: 4.49 10E6/UL (ref 4.4–5.9)
WBC # BLD AUTO: 7.1 10E3/UL (ref 4–11)

## 2024-05-02 PROCEDURE — 99214 OFFICE O/P EST MOD 30 MIN: CPT | Performed by: FAMILY MEDICINE

## 2024-05-02 PROCEDURE — 85027 COMPLETE CBC AUTOMATED: CPT | Performed by: FAMILY MEDICINE

## 2024-05-02 PROCEDURE — 80053 COMPREHEN METABOLIC PANEL: CPT | Performed by: FAMILY MEDICINE

## 2024-05-02 PROCEDURE — 36415 COLL VENOUS BLD VENIPUNCTURE: CPT | Performed by: FAMILY MEDICINE

## 2024-05-02 ASSESSMENT — PAIN SCALES - GENERAL: PAINLEVEL: NO PAIN (0)

## 2024-05-02 NOTE — PROGRESS NOTES
"  Assessment & Plan     Coronary artery disease involving autologous artery coronary bypass graft without angina pectoris  Patient wants to go off of his Plavix because of cosmetic easy bleeding he has been on it for 5 months we can titrate him off of this over the next 2 weeks on every other day and then he can discontinue but continue his baby aspirin  - Comprehensive metabolic panel (BMP + Alb, Alk Phos, ALT, AST, Total. Bili, TP); Future  - CBC with platelets; Future  - Comprehensive metabolic panel (BMP + Alb, Alk Phos, ALT, AST, Total. Bili, TP)  - CBC with platelets    Peyronie disease  Patient has symptomatic Peyronie's disease.  We will place a urology consultation to be to consider medication or surgery this is causing dyspareunia with his wife  - Adult Urology  Referral; Future            BMI  Estimated body mass index is 29.29 kg/m  as calculated from the following:    Height as of this encounter: 1.651 m (5' 5\").    Weight as of this encounter: 79.8 kg (176 lb).             Ponce Crane is a 73 year old, presenting for the following health issues:  Recheck Medication and RECHECK      5/2/2024     8:50 AM   Additional Questions   Roomed by chidi mendiola     HOLLI Crane is here for follow-up after having coronary artery bypass surgery in December; he wintered and recovered and did his cardiac rehabilitation while he was in [place].  He is back to almost baseline at least 90%.  He is lifting some objects 50 pounds I think he should cut back to that 20 pound level for at least another month or so.  He wants to go off of his Plavix which is okay we will titrate him off of that over the next 2 weeks.  He is experiencing very mild incisional pain no shortness of breath no dyspnea he is more concerned about his Real's disease which is troubled him prior to getting evaluated for coronary artery bypass.  We have placed a referral for that.  He is following up with Dr. Fabian and his group in the next " "month or so.  I will do a comprehensive profile hemogram today he is cardiovascular wise stable I enjoyed seeing Royce again time spent face-to-face non-face-to-face 29 minutes.              Review of Systems  CONSTITUTIONAL: NEGATIVE for fever, chills, change in weight  INTEGUMENTARY/SKIN: NEGATIVE for worrisome rashes, moles or lesions  EYES: NEGATIVE for vision changes or irritation  ENT/MOUTH: NEGATIVE for ear, mouth and throat problems  RESP: NEGATIVE for significant cough or SOB  BREAST: NEGATIVE for masses, tenderness or discharge  CV: NEGATIVE for chest pain, palpitations or peripheral edema  GI: NEGATIVE for nausea, abdominal pain, heartburn, or change in bowel habits  : NEGATIVE for frequency, dysuria, or hematuria  MUSCULOSKELETAL: NEGATIVE for significant arthralgias or myalgia  NEURO: NEGATIVE for weakness, dizziness or paresthesias  ENDOCRINE: NEGATIVE for temperature intolerance, skin/hair changes  HEME: NEGATIVE for bleeding problems  PSYCHIATRIC: NEGATIVE for changes in mood or affect      Objective    /68   Pulse 70   Temp 98.6  F (37  C)   Resp 18   Ht 1.651 m (5' 5\")   Wt 79.8 kg (176 lb)   SpO2 98%   BMI 29.29 kg/m    Body mass index is 29.29 kg/m .  Physical Exam   GENERAL: alert and no distress  NECK: no adenopathy, no asymmetry, masses, or scars  RESP: lungs clear to auscultation - no rales, rhonchi or wheezes  CV: regular rate and rhythm, normal S1 S2, no S3 or S4, no murmur, click or rub, no peripheral edema  ABDOMEN: soft, nontender, no hepatosplenomegaly, no masses and bowel sounds normal  MS: no gross musculoskeletal defects noted, no edema            Signed Electronically by: Luis Alfredo Bolanos MD    "

## 2024-05-03 LAB
ALBUMIN SERPL BCG-MCNC: 4.8 G/DL (ref 3.5–5.2)
ALP SERPL-CCNC: 83 U/L (ref 40–150)
ALT SERPL W P-5'-P-CCNC: 27 U/L (ref 0–70)
ANION GAP SERPL CALCULATED.3IONS-SCNC: 11 MMOL/L (ref 7–15)
AST SERPL W P-5'-P-CCNC: 26 U/L (ref 0–45)
BILIRUB SERPL-MCNC: 0.6 MG/DL
BUN SERPL-MCNC: 20.8 MG/DL (ref 8–23)
CALCIUM SERPL-MCNC: 9.9 MG/DL (ref 8.8–10.2)
CHLORIDE SERPL-SCNC: 104 MMOL/L (ref 98–107)
CREAT SERPL-MCNC: 1.11 MG/DL (ref 0.67–1.17)
DEPRECATED HCO3 PLAS-SCNC: 24 MMOL/L (ref 22–29)
EGFRCR SERPLBLD CKD-EPI 2021: 69 ML/MIN/1.73M2
GLUCOSE SERPL-MCNC: 108 MG/DL (ref 70–99)
POTASSIUM SERPL-SCNC: 4.6 MMOL/L (ref 3.4–5.3)
PROT SERPL-MCNC: 7.3 G/DL (ref 6.4–8.3)
SODIUM SERPL-SCNC: 139 MMOL/L (ref 135–145)

## 2024-05-08 NOTE — TELEPHONE ENCOUNTER
MEDICAL RECORDS REQUEST   [facility]  Urology Clinic  [address] [place] [address]  PHONE: [phone]  Fax: [phone]        FUTURE VISIT INFORMATION                                                   Royce Feliz, : 1950 scheduled for future visit at [facility]    APPOINTMENT INFORMATION:  Date: 24  Provider:  Jesu Roth MD   Reason for Visit/Diagnosis: Peyronie's Disease, Appt Per Patient, Med recs in chart, referred by Luis Alfredo Bolanos MD     REFERRAL INFORMATION:  Referring provider:  Luis Alfredo Bolanos MD @ [facility]    RECORDS REQUESTED FOR VISIT                                                     NOTES  STATUS/DETAILS   OFFICE NOTE from referring provider  yes, 24 --Luis Alfredo Bolanos MD @ [facility]   MEDICATION LIST  yes   LABS     URINALYSIS (UA)  yes     PRE-VISIT CHECKLIST      Joint diagnostic appointment coordinated correctly          (ensure right order & amount of time) Yes   RECORD COLLECTION COMPLETE Yes

## 2024-06-28 ENCOUNTER — PRE VISIT (OUTPATIENT)
Dept: UROLOGY | Facility: CLINIC | Age: 74
End: 2024-06-28
Payer: COMMERCIAL

## 2024-06-28 NOTE — TELEPHONE ENCOUNTER
Reason for visit: Peyronie's disease     Records/imaging/labs/orders: all records available    Pt called: no need for a call    At Rooming:  Standard rooming      Tip Ayala  6/28/2024  9:48 AM

## 2024-07-12 ENCOUNTER — OFFICE VISIT (OUTPATIENT)
Dept: UROLOGY | Facility: CLINIC | Age: 74
End: 2024-07-12
Attending: FAMILY MEDICINE
Payer: COMMERCIAL

## 2024-07-12 ENCOUNTER — PRE VISIT (OUTPATIENT)
Dept: UROLOGY | Facility: CLINIC | Age: 74
End: 2024-07-12

## 2024-07-12 VITALS
OXYGEN SATURATION: 98 % | HEART RATE: 84 BPM | WEIGHT: 168 LBS | HEIGHT: 66 IN | DIASTOLIC BLOOD PRESSURE: 95 MMHG | BODY MASS INDEX: 27 KG/M2 | SYSTOLIC BLOOD PRESSURE: 124 MMHG

## 2024-07-12 DIAGNOSIS — N48.6 PEYRONIE DISEASE: ICD-10-CM

## 2024-07-12 PROCEDURE — 99203 OFFICE O/P NEW LOW 30 MIN: CPT | Performed by: UROLOGY

## 2024-07-12 ASSESSMENT — PAIN SCALES - GENERAL: PAINLEVEL: NO PAIN (0)

## 2024-07-12 NOTE — PROGRESS NOTES
I am seeing Royce Feliz in consultation for evaluation of Peyronie's disease.      HPI:  Royce Feliz is a 73 year old male with history of Peyronie's disease.    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  Severity/Degree of curve 45 degree up and left.    ED: good firmness with Viagra  Dupuytren's contractures: No    Pain with erection: no  Degree of curve precludes intercourse:  these are painful for his wife.    PAST MEDICAL HX:  Past Medical History:   Diagnosis Date    Heart disease     Hypertension     Sleep apnea        PAST SURG HX:  Past Surgical History:   Procedure Laterality Date    BIOPSY SKIN (LOCATION)      CORONARY ARTERY BYPASS GRAFT, WITH ENDOSCOPIC VESSEL PROCUREMENT N/A 12/06/2023    Procedure: CORONARY ARTERY BYPASS GRAFT TIMES FOUR , LEFT INTERNAL MAMMARY ARTERY HARVEST, LEFT SAPHENOUS ENDOSCOPIC VESSEL PROCUREMENT,TRANSESOPHAGEAL ECHOCARDIOGRAM , EPI AORTIC ULTRASOUND;  Surgeon: Rene Schwab MD;  Location: [facility] OR    CV CORONARY ANGIOGRAM N/A 11/22/2023    Procedure: Coronary Angiogram;  Surgeon: Sanjay Arredondo MD;  Location: [facility] CATH LAB CV    CV PCI N/A 11/22/2023    Procedure: Percutaneous Coronary Intervention;  Surgeon: Sanjay Arredondo MD;  Location: [facility] LAB CV    ENT SURGERY      Some type of nose surgery    EYE SURGERY      TRANSESOPHAGEAL ECHOCARDIOGRAM INTRAOPERATIVE N/A 12/06/2023    Procedure: ANESTHESIA TRANSESOPHAGEAL ECHOCARDIOGRAM;  Surgeon: Rene Schwab MD;  Location: [facility] OR        FAMILY HX:  Family History   Problem Relation Age of Onset    Cancer Mother     Diabetes Father     Diabetes Brother     Cancer Sister     Varicose Veins Sister     Sleep Apnea Son     Sleep Apnea Son     Sleep Apnea Daughter        SOCIAL HX:  Social History     Tobacco Use    Smoking status: Never     Passive exposure:  Never    Smokeless tobacco: Never   Vaping Use    Vaping status: Never Used   Substance Use Topics    Alcohol use: Yes     Comment: moderate    Drug use: Never       MEDICATIONS:  Current Outpatient Medications   Medication Sig Dispense Refill    acetaminophen (TYLENOL) 500 MG tablet Take 1-2 tablets (500-1,000 mg) by mouth every 6 hours as needed for mild pain      aspirin 81 MG EC tablet Take 1 tablet (81 mg) by mouth daily 100 tablet 3    clopidogrel (PLAVIX) 75 MG tablet Take 1 tablet (75 mg) by mouth daily 90 tablet 3    coenzyme Q10 200 mg capsule [COENZYME Q10 200 MG CAPSULE] Take 200 mg by mouth daily. 90 capsule 3    ezetimibe (ZETIA) 10 MG tablet Take 1 tablet (10 mg) by mouth every evening 90 tablet 3    Multiple Vitamin (MULTIVITAMIN ADULT PO) Take 1 tablet by mouth daily      OMEGA-3/DHA/EPA/FISH OIL (FISH OIL-OMEGA-3 FATTY ACIDS) 300-1,000 mg capsule Take 2 g by mouth daily      omeprazole (PRILOSEC) 20 MG DR capsule TAKE ONE CAPSULE BY MOUTH DAILY 90 capsule 3    senna-docusate (SENOKOT-S/PERICOLACE) 8.6-50 MG tablet Take 1 tablet by mouth 2 times daily 14 tablet 0    sildenafil (VIAGRA) 50 MG tablet [SILDENAFIL (VIAGRA) 50 MG TABLET] TAKE 1 TABLET BY MOUTH 1 HOUR BEFORE NEEDED 18 tablet 3    simvastatin (ZOCOR) 80 MG tablet TAKE 1 TABLET BY MOUTH DAILY 90 tablet 2     No current facility-administered medications for this visit.       ALLERGIES:  Patient has no known allergies.    GENERAL PHYSICAL EXAM:   Constitutional: No acute distress. Well nourished.   PSYCH: normal mood and affect.  NEURO: normal gait, no focal deficits.   EYES: anicteric, EOMI, PERR.  CARDIOPULMONARY: breathing non-labored, pulse regular rate/rhythm, no peripheral edema.  GI: Abdomen soft, non-tender,nondistended   MUSCULOSKELETAL: normal limb proportions, no muscle wasting, no contractures.  SKIN: Normal virilized hair distribution, no lesions, warts or rashes over genitalia, abdomen extremities or face.  HEME/LYMPH: no  ecchymosis, no lymphadenopathy in groin, no lymphedema.     EXAM:  Phallus circumcised, meatus adequate, some left corporal scar/plaques palpated at the left mid and distal left lateral erectile body.  Left testis descended , size is normal  , consistency is normal . No intra-testicular masses.   Right testis descended , size is normal  , consistency is normal . No intra-testicular masses.   Epididymes present, non-tender, not-enlarged.   Cord structures not remarkable.     Prostate exam: deferred     Imaging/labs:  Lab Results   Component Value Date    PSA 0.90 08/29/2023    PSA 0.86 11/17/2021    PSA 1.0 10/26/2020    PSA 0.8 09/25/2019    PSA 1.4 05/23/2018    PSA 1.1 04/24/2017    PSA 0.9 11/25/2015    PSA 0.9 09/03/2014        ASSESSMENT:   Peyronie's disease with curvature.    Erectile dysfunction, preexisting, managed with sildenafil..    PLAN:  Prescription for RestoreX traction device.  Consider collagenase injections if traction alone is not helpful.  Discussed the protocol for this option.  Collagenase can help soften the curve to make stretches more effective.  Discussed penile plication as a surgical option which is more aggressive but can usually give a complete/more complete correction in a timely fashion.  I'm happy to see him back in the future as needed.       Copied cc to Consulting provider Dr. Luis Alfredo Bolanos        Thank-you,  Jesu Roth MD     Urological Surgeon     --------------------------------------------------------------------------------------------------------------------   Additional Coding Information:    Problems:  3 -- one stable chronic illness    Data Reviewed  N/A     Tests ordered/pending: N/A     Level of risk:  4 -- prescription drug management ( discussed Xiaflex option)    Time spent:  22 minutes spent on the date of the encounter doing chart review, history and exam, documentation and further activities per the note

## 2024-07-12 NOTE — NURSING NOTE
"Chief Complaint   Patient presents with    Consult     Here for peyronie's disease       Height 1.676 m (5' 6\"), weight 76.2 kg (168 lb). Body mass index is 27.12 kg/m .    Patient Active Problem List   Diagnosis    Hypercholesteremia    Prostatism    High cholesterol    Malignant melanoma of torso excluding breast (H)    Coronary artery disease due to calcified coronary lesion    Precordial pain    Status post coronary angiogram    Abnormal cardiac CT angiography    Abnormal findings on diagnostic imaging of heart and coronary circulation    Coronary artery disease involving native coronary artery of native heart with angina pectoris (H24)       No Known Allergies    Current Outpatient Medications   Medication Sig Dispense Refill    acetaminophen (TYLENOL) 500 MG tablet Take 1-2 tablets (500-1,000 mg) by mouth every 6 hours as needed for mild pain      aspirin 81 MG EC tablet Take 1 tablet (81 mg) by mouth daily 100 tablet 3    clopidogrel (PLAVIX) 75 MG tablet Take 1 tablet (75 mg) by mouth daily 90 tablet 3    coenzyme Q10 200 mg capsule [COENZYME Q10 200 MG CAPSULE] Take 200 mg by mouth daily. 90 capsule 3    ezetimibe (ZETIA) 10 MG tablet Take 1 tablet (10 mg) by mouth every evening 90 tablet 3    Multiple Vitamin (MULTIVITAMIN ADULT PO) Take 1 tablet by mouth daily      OMEGA-3/DHA/EPA/FISH OIL (FISH OIL-OMEGA-3 FATTY ACIDS) 300-1,000 mg capsule Take 2 g by mouth daily      omeprazole (PRILOSEC) 20 MG DR capsule TAKE ONE CAPSULE BY MOUTH DAILY 90 capsule 3    senna-docusate (SENOKOT-S/PERICOLACE) 8.6-50 MG tablet Take 1 tablet by mouth 2 times daily 14 tablet 0    sildenafil (VIAGRA) 50 MG tablet [SILDENAFIL (VIAGRA) 50 MG TABLET] TAKE 1 TABLET BY MOUTH 1 HOUR BEFORE NEEDED 18 tablet 3    simvastatin (ZOCOR) 80 MG tablet TAKE 1 TABLET BY MOUTH DAILY 90 tablet 2     Pt states that he has had a curve for all of his life. More recently about a year ago, he noticed during sex with his wife that she was in more " pain. No pain associated with penis or scar tissue noticed    Social History     Tobacco Use    Smoking status: Never     Passive exposure: Never    Smokeless tobacco: Never   Vaping Use    Vaping status: Never Used   Substance Use Topics    Alcohol use: Yes     Comment: moderate    Drug use: Never       Tip Ayala  7/12/2024  8:51 AM

## 2024-07-12 NOTE — LETTER
7/12/2024       RE: Royce Feliz  [address] [place] [address]     Dear Colleague,    Thank you for referring your patient, Royce Feliz, to the [facility] [place] at [facility]. Please see a copy of my visit note below.    I am seeing Royce Feliz in consultation for evaluation of Peyronie's disease.      HPI:  Royce Feliz is a 73 year old male with history of Peyronie's disease.    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  Severity/Degree of curve 45 degree up and left.    ED: good firmness with Viagra  Dupuytren's contractures: No    Pain with erection: no  Degree of curve precludes intercourse:  these are painful for his wife.    PAST MEDICAL HX:  Past Medical History:   Diagnosis Date    Heart disease     Hypertension     Sleep apnea        PAST SURG HX:  Past Surgical History:   Procedure Laterality Date    BIOPSY SKIN (LOCATION)      CORONARY ARTERY BYPASS GRAFT, WITH ENDOSCOPIC VESSEL PROCUREMENT N/A 12/06/2023    Procedure: CORONARY ARTERY BYPASS GRAFT TIMES FOUR , LEFT INTERNAL MAMMARY ARTERY HARVEST, LEFT SAPHENOUS ENDOSCOPIC VESSEL PROCUREMENT,TRANSESOPHAGEAL ECHOCARDIOGRAM , EPI AORTIC ULTRASOUND;  Surgeon: Rene Schwab MD;  Location: [facility] CORONARY ANGIOGRAM N/A 11/22/2023    Procedure: Coronary Angiogram;  Surgeon: Sanjay Arredondo MD;  Location: [facility] CV    CV PCI N/A 11/22/2023    Procedure: Percutaneous Coronary Intervention;  Surgeon: Sanjay Arredondo MD;  Location: [facility] CV    ENT SURGERY      Some type of nose surgery    EYE SURGERY      TRANSESOPHAGEAL ECHOCARDIOGRAM INTRAOPERATIVE N/A 12/06/2023    Procedure: ANESTHESIA TRANSESOPHAGEAL ECHOCARDIOGRAM;  Surgeon: Rene Schwab MD;  Location: [facility]         FAMILY HX:  Family History   Problem Relation Age of Onset    Cancer Mother     Diabetes Father     Diabetes Brother     Cancer Sister     Varicose Veins Sister     Sleep Apnea Son     Sleep Apnea Son     Sleep Apnea Daughter        SOCIAL HX:  Social History     Tobacco Use    Smoking status: Never     Passive exposure: Never    Smokeless tobacco: Never   Vaping Use    Vaping status: Never Used   Substance Use Topics    Alcohol use: Yes     Comment: moderate    Drug use: Never       MEDICATIONS:  Current Outpatient Medications   Medication Sig Dispense Refill    acetaminophen (TYLENOL) 500 MG tablet Take 1-2 tablets (500-1,000 mg) by mouth every 6 hours as needed for mild pain      aspirin 81 MG EC tablet Take 1 tablet (81 mg) by mouth daily 100 tablet 3    clopidogrel (PLAVIX) 75 MG tablet Take 1 tablet (75 mg) by mouth daily 90 tablet 3    coenzyme Q10 200 mg capsule [COENZYME Q10 200 MG CAPSULE] Take 200 mg by mouth daily. 90 capsule 3    ezetimibe (ZETIA) 10 MG tablet Take 1 tablet (10 mg) by mouth every evening 90 tablet 3    Multiple Vitamin (MULTIVITAMIN ADULT PO) Take 1 tablet by mouth daily      OMEGA-3/DHA/EPA/FISH OIL (FISH OIL-OMEGA-3 FATTY ACIDS) 300-1,000 mg capsule Take 2 g by mouth daily      omeprazole (PRILOSEC) 20 MG DR capsule TAKE ONE CAPSULE BY MOUTH DAILY 90 capsule 3    senna-docusate (SENOKOT-S/PERICOLACE) 8.6-50 MG tablet Take 1 tablet by mouth 2 times daily 14 tablet 0    sildenafil (VIAGRA) 50 MG tablet [SILDENAFIL (VIAGRA) 50 MG TABLET] TAKE 1 TABLET BY MOUTH 1 HOUR BEFORE NEEDED 18 tablet 3    simvastatin (ZOCOR) 80 MG tablet TAKE 1 TABLET BY MOUTH DAILY 90 tablet 2     No current facility-administered medications for this visit.       ALLERGIES:  Patient has no known allergies.    GENERAL PHYSICAL EXAM:   Constitutional: No acute distress. Well nourished.   PSYCH: normal mood and affect.  NEURO: normal gait, no focal deficits.   EYES: anicteric, EOMI, PERR.  CARDIOPULMONARY:  breathing non-labored, pulse regular rate/rhythm, no peripheral edema.  GI: Abdomen soft, non-tender,nondistended   MUSCULOSKELETAL: normal limb proportions, no muscle wasting, no contractures.  SKIN: Normal virilized hair distribution, no lesions, warts or rashes over genitalia, abdomen extremities or face.  HEME/LYMPH: no ecchymosis, no lymphadenopathy in groin, no lymphedema.     EXAM:  Phallus circumcised, meatus adequate, some left corporal scar/plaques palpated at the left mid and distal left lateral erectile body.  Left testis descended , size is normal  , consistency is normal . No intra-testicular masses.   Right testis descended , size is normal  , consistency is normal . No intra-testicular masses.   Epididymes present, non-tender, not-enlarged.   Cord structures not remarkable.     Prostate exam: deferred     Imaging/labs:  Lab Results   Component Value Date    PSA 0.90 08/29/2023    PSA 0.86 11/17/2021    PSA 1.0 10/26/2020    PSA 0.8 09/25/2019    PSA 1.4 05/23/2018    PSA 1.1 04/24/2017    PSA 0.9 11/25/2015    PSA 0.9 09/03/2014        ASSESSMENT:   Peyronie's disease with curvature.    Erectile dysfunction, preexisting, managed with sildenafil..    PLAN:  Prescription for RestoreX traction device.  Consider collagenase injections if traction alone is not helpful.  Discussed the protocol for this option.  Collagenase can help soften the curve to make stretches more effective.  Discussed penile plication as a surgical option which is more aggressive but can usually give a complete/more complete correction in a timely fashion.  I'm happy to see him back in the future as needed.       Copied cc to Consulting provider Dr. Luis Alfredo Bolanos        Thank-you,  Jesu Roth MD     Urological Surgeon     --------------------------------------------------------------------------------------------------------------------   Additional Coding Information:    Problems:  3 -- one stable chronic illness    Data  Reviewed  N/A     Tests ordered/pending: N/A     Level of risk:  4 -- prescription drug management ( discussed Xiaflex option)    Time spent:  22 minutes spent on the date of the encounter doing chart review, history and exam, documentation and further activities per the note

## 2024-07-26 NOTE — PROGRESS NOTES
"    Cardiology Progress Note     Assessment:  Coronary artery disease, multivessel status post coronary artery bypass graft surgery in December 2023(WOOTEN to LAD, 2 vein grafts to distal RCA and vein graft to diagonal branch)-stable, no angina, preserved LV systolic function  Hypercholesterolemia on statin and Zetia    Plan:  Continue cardiac rehab  Wean of metoprolol as he is normal LV systolic function, no arrhythmias and normal blood pressure  He will stay on Plavix for a year after the surgery per surgeon's preference    Follow-up with me in 6 months    Subjective:   This is 73 year old male who comes in today for follow-up visit.  I saw him in November when he was complaining of some exertional shortness of breath.  He has known coronary artery disease previously diagnosed at [facility].  He underwent CT and then invasive coronary angiogram.  It confirmed progression of coronary artery disease.  He underwent surgical revascularization without complications.  He is doing well now.  He denies any incisional chest discomfort.  He is weight is stable.  He has no heart palpitations.  He has no lightheadedness.  He is planning to go to [place] for the next 3 months.    Review of Systems:   Negative other than history of present illness    Objective:   /64 (BP Location: Right arm, Patient Position: Sitting, Cuff Size: Adult Regular)   Pulse 97   Resp 14   Ht 1.651 m (5' 5\")   Wt 77.1 kg (170 lb)   BMI 28.29 kg/m    Physical Exam:  GENERAL: no distress  NECK: No JVD  LUNGS: A few dry crackles at the bases  CARDIAC: regular rhythm, S1 & S2 normal.  No heaves, thrills, gallops or murmurs.  ABDOMEN: flat, negative hepatosplenomegaly, soft and non-tender.  EXTREMITIES: No evidence of cyanosis, clubbing or edema.    Current Outpatient Medications   Medication Sig Dispense Refill    acetaminophen (TYLENOL) 500 MG tablet Take 1-2 tablets (500-1,000 mg) by mouth every 6 hours as needed for mild pain      aspirin 81 " Writer called CT around 0215am to see if they were ready yet for patients stat CT ordered at 2145. CT said they would call back when patient was able to be brought down.     Patient to CT at 0350.   MG EC tablet Take 1 tablet (81 mg) by mouth daily 100 tablet 3    clopidogrel (PLAVIX) 75 MG tablet Take 1 tablet (75 mg) by mouth daily 90 tablet 3    coenzyme Q10 200 mg capsule [COENZYME Q10 200 MG CAPSULE] Take 200 mg by mouth daily. 90 capsule 3    ezetimibe (ZETIA) 10 MG tablet Take 1 tablet (10 mg) by mouth every evening 90 tablet 3    Multiple Vitamin (MULTIVITAMIN ADULT PO) Take 1 tablet by mouth daily      OMEGA-3/DHA/EPA/FISH OIL (FISH OIL-OMEGA-3 FATTY ACIDS) 300-1,000 mg capsule Take 2 g by mouth daily      omeprazole (PRILOSEC) 20 MG DR capsule TAKE ONE CAPSULE BY MOUTH DAILY 90 capsule 3    senna-docusate (SENOKOT-S/PERICOLACE) 8.6-50 MG tablet Take 1 tablet by mouth 2 times daily 14 tablet 0    sildenafil (VIAGRA) 50 MG tablet [SILDENAFIL (VIAGRA) 50 MG TABLET] TAKE 1 TABLET BY MOUTH 1 HOUR BEFORE NEEDED 18 tablet 3    simvastatin (ZOCOR) 80 MG tablet TAKE 1 TABLET BY MOUTH DAILY 90 tablet 2       Cardiographics:    EC2023 read by me normal sinus rhythm within normal limits    Echocardiogram: 2023  1. The left ventricle is normal in size. Left ventricular function is  normal.The ejection fraction is 60-65%. There is normal left ventricular wall  thickness. Left ventricular diastolic function is normal. No regional wall  motion abnormalities noted.  2. Normal right ventricle size and systolic function.  3. No hemodynamically significant valvular abnormalities on 2D or color flow  imaging.    Coronary angio: 2023  1.  Severe calcific multivessel coronary disease  2.  Sequential calcified stenoses proximal to mid LAD, involving takeoff of large first diagonal branch.  3.  Diffuse severe right coronary calcification with moderate stenosis proximal third and severe stenosis distally proximal to the crux.  Moderately severe stenosis mid PDA, with a large posterolateral branch that appears widely patent.  4.  Left circumflex is a very small system.  5.  Normal LVEDP   Lab Results   "  Chemistry/lipid CBC Cardiac Enzymes/BNP/TSH/INR   Recent Labs   Lab Test 11/22/23 0721   CHOL 149   HDL 56   LDL 64   TRIG 145     Recent Labs   Lab Test 11/22/23  0721 08/29/23  0800 12/06/22  1050   LDL 64 60 62     Recent Labs   Lab Test 12/11/23  1151 12/11/23  0748 12/11/23  0428 12/07/23  0359 12/07/23  0358   NA  --   --   --   --  140   POTASSIUM  --   --  3.9   < > 4.0   CHLORIDE  --   --   --   --  107   CO2  --   --   --   --  25   *   < >  --    < > 134*   BUN  --   --   --   --  15.1   CR  --   --   --   --  1.05   GFRESTIMATED  --   --   --   --  74   GADIEL  --   --   --   --  8.3*    < > = values in this interval not displayed.     Recent Labs   Lab Test 12/07/23 0358 12/06/23  1232 12/04/23  0827   CR 1.05 1.12 1.20*     Recent Labs   Lab Test 12/04/23  0827 08/29/23  0800 11/17/21  0904   A1C 5.9* 5.6 5.4          Recent Labs   Lab Test 12/10/23  0449 12/07/23 0358   WBC  --  14.3*   HGB  --  10.0*   HCT  --  30.6*   MCV  --  96    140*     Recent Labs   Lab Test 12/07/23  0358 12/06/23  1232 12/06/23  1119   HGB 10.0* 11.5* 11.1*  11.7*    No results for input(s): \"TROPONINI\" in the last 68923 hours.  No results for input(s): \"BNP\", \"NTBNPI\", \"NTBNP\" in the last 34668 hours.  No results for input(s): \"TSH\" in the last 55298 hours.  Recent Labs   Lab Test 12/06/23  1232 12/06/23  1119 12/04/23  0827   INR 1.17* 1.33* 0.93                     "

## 2024-08-20 ENCOUNTER — PATIENT OUTREACH (OUTPATIENT)
Dept: CARE COORDINATION | Facility: CLINIC | Age: 74
End: 2024-08-20
Payer: COMMERCIAL

## 2024-08-23 ENCOUNTER — OFFICE VISIT (OUTPATIENT)
Dept: CARDIOLOGY | Facility: CLINIC | Age: 74
End: 2024-08-23
Payer: COMMERCIAL

## 2024-08-23 VITALS
BODY MASS INDEX: 27.44 KG/M2 | DIASTOLIC BLOOD PRESSURE: 62 MMHG | HEART RATE: 72 BPM | RESPIRATION RATE: 14 BRPM | SYSTOLIC BLOOD PRESSURE: 118 MMHG | WEIGHT: 170 LBS

## 2024-08-23 DIAGNOSIS — I25.84 CORONARY ARTERY DISEASE DUE TO CALCIFIED CORONARY LESION: Primary | ICD-10-CM

## 2024-08-23 DIAGNOSIS — I25.10 CORONARY ARTERY DISEASE DUE TO CALCIFIED CORONARY LESION: Primary | ICD-10-CM

## 2024-08-23 DIAGNOSIS — E78.00 HYPERCHOLESTEREMIA: ICD-10-CM

## 2024-08-23 DIAGNOSIS — Z95.1 S/P CABG (CORONARY ARTERY BYPASS GRAFT): ICD-10-CM

## 2024-08-23 LAB
ANION GAP SERPL CALCULATED.3IONS-SCNC: 12 MMOL/L (ref 7–15)
BUN SERPL-MCNC: 19.3 MG/DL (ref 8–23)
CALCIUM SERPL-MCNC: 9.2 MG/DL (ref 8.8–10.4)
CHLORIDE SERPL-SCNC: 104 MMOL/L (ref 98–107)
CHOLEST SERPL-MCNC: 135 MG/DL
CREAT SERPL-MCNC: 1.09 MG/DL (ref 0.67–1.17)
EGFRCR SERPLBLD CKD-EPI 2021: 71 ML/MIN/1.73M2
FASTING STATUS PATIENT QL REPORTED: YES
FASTING STATUS PATIENT QL REPORTED: YES
GLUCOSE SERPL-MCNC: 117 MG/DL (ref 70–99)
HCO3 SERPL-SCNC: 24 MMOL/L (ref 22–29)
HDLC SERPL-MCNC: 57 MG/DL
LDLC SERPL CALC-MCNC: 61 MG/DL
NONHDLC SERPL-MCNC: 78 MG/DL
POTASSIUM SERPL-SCNC: 4.1 MMOL/L (ref 3.4–5.3)
SODIUM SERPL-SCNC: 140 MMOL/L (ref 135–145)
TRIGL SERPL-MCNC: 83 MG/DL

## 2024-08-23 PROCEDURE — G2211 COMPLEX E/M VISIT ADD ON: HCPCS | Performed by: INTERNAL MEDICINE

## 2024-08-23 PROCEDURE — 99214 OFFICE O/P EST MOD 30 MIN: CPT | Performed by: INTERNAL MEDICINE

## 2024-08-23 PROCEDURE — 80048 BASIC METABOLIC PNL TOTAL CA: CPT | Performed by: INTERNAL MEDICINE

## 2024-08-23 PROCEDURE — 80061 LIPID PANEL: CPT | Performed by: INTERNAL MEDICINE

## 2024-08-23 PROCEDURE — 36415 COLL VENOUS BLD VENIPUNCTURE: CPT | Performed by: INTERNAL MEDICINE

## 2024-08-23 NOTE — PATIENT INSTRUCTIONS
Royce Feliz,    It was a pleasure to see you today at [facility].     My recommendations after this visit include:    Stop clopidogrel  lipids    WMario Zhou MD, FACC, ANN

## 2024-08-23 NOTE — LETTER
8/23/2024    Luis Alfredo Bolanos MD  [address]  [facility] [address]    RE: Royce Feliz       Dear Colleague,     I had the pleasure of seeing Royce Feliz in the [facility].      Cardiology Progress Note     Assessment:  Coronary artery disease, multivessel status post coronary artery bypass graft surgery in December 2023(WOOTEN to LAD, 2 vein grafts to distal RCA and vein graft to diagonal branch)-stable, no angina, preserved LV systolic function  Hypercholesterolemia on statin and Zetia  Easy bruising secondary to clopidogrel and fish oil    Plan:  Check lipids today-switch to contemporary statin when the results available  Continue baby aspirin, stop Plavix and fish oil  We discussed secondary prevention of cardiac events    Follow-up in 12 months    Subjective:   This is 73 year old male who comes in today for follow-up visit.  He has done well.  He denies chest pain or shortness of breath.  He is physically active.  His weight has been stable.  He is compliant with all the medication.  He complains of easy bruising he denies any profound bleeding    Review of Systems:   Negative other than history of present illness    Objective:   /62 (BP Location: Left arm, Patient Position: Sitting, Cuff Size: Adult Regular)   Pulse 72   Resp 14   Wt 77.1 kg (170 lb)   BMI 27.44 kg/m    Physical Exam:  GENERAL: no distress  NECK: No JVD  LUNGS: Clear to auscultation.  CARDIAC: regular rhythm, S1 & S2 normal.  No heaves, thrills, gallops or murmurs.  ABDOMEN: flat, negative hepatosplenomegaly, soft and non-tender.  EXTREMITIES: No evidence of cyanosis, clubbing or edema.    Current Outpatient Medications   Medication Sig Dispense Refill     acetaminophen (TYLENOL) 500 MG tablet Take 1-2 tablets (500-1,000 mg) by mouth every 6 hours as needed for mild pain       coenzyme Q10 200 mg capsule [COENZYME Q10 200 MG CAPSULE] Take 200 mg by mouth daily. 90 capsule 3     ezetimibe  (ZETIA) 10 MG tablet Take 1 tablet (10 mg) by mouth every evening 90 tablet 3     Multiple Vitamin (MULTIVITAMIN ADULT PO) Take 1 tablet by mouth daily       OMEGA-3/DHA/EPA/FISH OIL (FISH OIL-OMEGA-3 FATTY ACIDS) 300-1,000 mg capsule Take 2 g by mouth daily       omeprazole (PRILOSEC) 20 MG DR capsule TAKE ONE CAPSULE BY MOUTH DAILY 90 capsule 3     sildenafil (VIAGRA) 50 MG tablet [SILDENAFIL (VIAGRA) 50 MG TABLET] TAKE 1 TABLET BY MOUTH 1 HOUR BEFORE NEEDED 18 tablet 3     simvastatin (ZOCOR) 80 MG tablet TAKE 1 TABLET BY MOUTH DAILY 90 tablet 2     aspirin 81 MG EC tablet Take 1 tablet (81 mg) by mouth daily (Patient not taking: Reported on 2024) 100 tablet 3       Cardiographics:      EC2023 read by me normal sinus rhythm within normal limits     Echocardiogram: 2023  1. The left ventricle is normal in size. Left ventricular function is  normal.The ejection fraction is 60-65%. There is normal left ventricular wall  thickness. Left ventricular diastolic function is normal. No regional wall  motion abnormalities noted.  2. Normal right ventricle size and systolic function.  3. No hemodynamically significant valvular abnormalities on 2D or color flow  imaging.     Coronary angio: 2023  1.  Severe calcific multivessel coronary disease  2.  Sequential calcified stenoses proximal to mid LAD, involving takeoff of large first diagonal branch.  3.  Diffuse severe right coronary calcification with moderate stenosis proximal third and severe stenosis distally proximal to the crux.  Moderately severe stenosis mid PDA, with a large posterolateral branch that appears widely patent.  4.  Left circumflex is a very small system.  5.  Normal LVEDP   Lab Results    Chemistry/lipid CBC Cardiac Enzymes/BNP/TSH/INR   Recent Labs   Lab Test 23  0721   CHOL 149   HDL 56   LDL 64   TRIG 145     Recent Labs   Lab Test 23  0721 23  0800 22  1050   LDL 64 60 62     Recent Labs   Lab  "Test 05/02/24  0929      POTASSIUM 4.6   CHLORIDE 104   CO2 24   *   BUN 20.8   CR 1.11   GFRESTIMATED 69   GADIEL 9.9     Recent Labs   Lab Test 05/02/24  0929 12/07/23  0358 12/06/23  1232   CR 1.11 1.05 1.12     Recent Labs   Lab Test 12/04/23  0827 08/29/23  0800 11/17/21  0904   A1C 5.9* 5.6 5.4          Recent Labs   Lab Test 05/02/24  0929   WBC 7.1   HGB 13.6   HCT 41.6   MCV 93        Recent Labs   Lab Test 05/02/24  0929 12/07/23  0358 12/06/23  1232   HGB 13.6 10.0* 11.5*    No results for input(s): \"TROPONINI\" in the last 53322 hours.  No results for input(s): \"BNP\", \"NTBNPI\", \"NTBNP\" in the last 44728 hours.  No results for input(s): \"TSH\" in the last 68405 hours.  Recent Labs   Lab Test 12/06/23  1232 12/06/23  1119 12/04/23  0827   INR 1.17* 1.33* 0.93                         Thank you for allowing me to participate in the care of your patient.      Sincerely,     Iván Zhou MD     [facility]  cc:   Severiano Zhou MD  [address]  [address]  [place] [address]      "

## 2024-08-23 NOTE — PROGRESS NOTES
Cardiology Progress Note     Assessment:  Coronary artery disease, multivessel status post coronary artery bypass graft surgery in December 2023(WOOTEN to LAD, 2 vein grafts to distal RCA and vein graft to diagonal branch)-stable, no angina, preserved LV systolic function  Hypercholesterolemia on statin and Zetia  Easy bruising secondary to clopidogrel and fish oil    Plan:  Check lipids today-switch to contemporary statin when the results available  Continue baby aspirin, stop Plavix and fish oil  We discussed secondary prevention of cardiac events    Follow-up in 12 months    Subjective:   This is 73 year old male who comes in today for follow-up visit.  He has done well.  He denies chest pain or shortness of breath.  He is physically active.  His weight has been stable.  He is compliant with all the medication.  He complains of easy bruising he denies any profound bleeding    Review of Systems:   Negative other than history of present illness    Objective:   /62 (BP Location: Left arm, Patient Position: Sitting, Cuff Size: Adult Regular)   Pulse 72   Resp 14   Wt 77.1 kg (170 lb)   BMI 27.44 kg/m    Physical Exam:  GENERAL: no distress  NECK: No JVD  LUNGS: Clear to auscultation.  CARDIAC: regular rhythm, S1 & S2 normal.  No heaves, thrills, gallops or murmurs.  ABDOMEN: flat, negative hepatosplenomegaly, soft and non-tender.  EXTREMITIES: No evidence of cyanosis, clubbing or edema.    Current Outpatient Medications   Medication Sig Dispense Refill    acetaminophen (TYLENOL) 500 MG tablet Take 1-2 tablets (500-1,000 mg) by mouth every 6 hours as needed for mild pain      coenzyme Q10 200 mg capsule [COENZYME Q10 200 MG CAPSULE] Take 200 mg by mouth daily. 90 capsule 3    ezetimibe (ZETIA) 10 MG tablet Take 1 tablet (10 mg) by mouth every evening 90 tablet 3    Multiple Vitamin (MULTIVITAMIN ADULT PO) Take 1 tablet by mouth daily      OMEGA-3/DHA/EPA/FISH OIL (FISH OIL-OMEGA-3 FATTY ACIDS) 300-1,000 mg  capsule Take 2 g by mouth daily      omeprazole (PRILOSEC) 20 MG DR capsule TAKE ONE CAPSULE BY MOUTH DAILY 90 capsule 3    sildenafil (VIAGRA) 50 MG tablet [SILDENAFIL (VIAGRA) 50 MG TABLET] TAKE 1 TABLET BY MOUTH 1 HOUR BEFORE NEEDED 18 tablet 3    simvastatin (ZOCOR) 80 MG tablet TAKE 1 TABLET BY MOUTH DAILY 90 tablet 2    aspirin 81 MG EC tablet Take 1 tablet (81 mg) by mouth daily (Patient not taking: Reported on 2024) 100 tablet 3       Cardiographics:      EC2023 read by me normal sinus rhythm within normal limits     Echocardiogram: 2023  1. The left ventricle is normal in size. Left ventricular function is  normal.The ejection fraction is 60-65%. There is normal left ventricular wall  thickness. Left ventricular diastolic function is normal. No regional wall  motion abnormalities noted.  2. Normal right ventricle size and systolic function.  3. No hemodynamically significant valvular abnormalities on 2D or color flow  imaging.     Coronary angio: 2023  1.  Severe calcific multivessel coronary disease  2.  Sequential calcified stenoses proximal to mid LAD, involving takeoff of large first diagonal branch.  3.  Diffuse severe right coronary calcification with moderate stenosis proximal third and severe stenosis distally proximal to the crux.  Moderately severe stenosis mid PDA, with a large posterolateral branch that appears widely patent.  4.  Left circumflex is a very small system.  5.  Normal LVEDP   Lab Results    Chemistry/lipid CBC Cardiac Enzymes/BNP/TSH/INR   Recent Labs   Lab Test 23  0721   CHOL 149   HDL 56   LDL 64   TRIG 145     Recent Labs   Lab Test 23  0721 23  0800 22  1050   LDL 64 60 62     Recent Labs   Lab Test 24  0929      POTASSIUM 4.6   CHLORIDE 104   CO2 24   *   BUN 20.8   CR 1.11   GFRESTIMATED 69   GADIEL 9.9     Recent Labs   Lab Test 24  0929 23  0358 23  1232   CR 1.11 1.05 1.12     Recent  "Labs   Lab Test 12/04/23  0827 08/29/23  0800 11/17/21  0904   A1C 5.9* 5.6 5.4          Recent Labs   Lab Test 05/02/24  0929   WBC 7.1   HGB 13.6   HCT 41.6   MCV 93        Recent Labs   Lab Test 05/02/24  0929 12/07/23  0358 12/06/23  1232   HGB 13.6 10.0* 11.5*    No results for input(s): \"TROPONINI\" in the last 34262 hours.  No results for input(s): \"BNP\", \"NTBNPI\", \"NTBNP\" in the last 32211 hours.  No results for input(s): \"TSH\" in the last 17419 hours.  Recent Labs   Lab Test 12/06/23  1232 12/06/23  1119 12/04/23  0827   INR 1.17* 1.33* 0.93                     "

## 2024-08-27 ENCOUNTER — TELEPHONE (OUTPATIENT)
Dept: CARDIOLOGY | Facility: CLINIC | Age: 74
End: 2024-08-27
Payer: COMMERCIAL

## 2024-08-27 DIAGNOSIS — I25.10 CORONARY ARTERY DISEASE DUE TO CALCIFIED CORONARY LESION: Primary | ICD-10-CM

## 2024-08-27 DIAGNOSIS — I25.84 CORONARY ARTERY DISEASE DUE TO CALCIFIED CORONARY LESION: Primary | ICD-10-CM

## 2024-08-27 RX ORDER — ROSUVASTATIN CALCIUM 40 MG/1
40 TABLET, COATED ORAL DAILY
Qty: 90 TABLET | Refills: 3 | Status: SHIPPED | OUTPATIENT
Start: 2024-08-27

## 2024-08-27 NOTE — TELEPHONE ENCOUNTER
Called Royce and updated on results and recommendations from [facility]. He verbalized understanding and the plan was to switch his statin based on last OV with Dr. Zhou. Rosuvastatin sent to preferred pharmacy and simvastatin discontinued. He will continue on Zetia. Repeat Lipid panel ordered for in 3 months- he will have done at pmd office. Results of BMP routed to Dr. Bolanos to address fasting glucose level. He admitted that he did have some gum prior to coming in for his blood work- it had sugar. He did not want to have coffee breath for Dr. Zhou. -[facility] [place]    2

## 2024-08-27 NOTE — TELEPHONE ENCOUNTER
----- Message from Iván Zhou sent at 8/23/2024 11:25 AM CDT -----  Cholesterol levels are acceptable.  We should switch him from simvastatin to rosuvastatin 40 mg daily and repeat lipid profile in 3 months  Electrolytes and kidneys are fine.  Fasting glucose is mildly elevated.  Should follow-up on this with his primary

## 2024-09-03 ENCOUNTER — PATIENT OUTREACH (OUTPATIENT)
Dept: CARE COORDINATION | Facility: CLINIC | Age: 74
End: 2024-09-03
Payer: COMMERCIAL

## 2024-10-29 ENCOUNTER — OFFICE VISIT (OUTPATIENT)
Dept: FAMILY MEDICINE | Facility: CLINIC | Age: 74
End: 2024-10-29
Payer: COMMERCIAL

## 2024-10-29 VITALS
OXYGEN SATURATION: 98 % | SYSTOLIC BLOOD PRESSURE: 120 MMHG | WEIGHT: 170 LBS | DIASTOLIC BLOOD PRESSURE: 70 MMHG | BODY MASS INDEX: 26.68 KG/M2 | RESPIRATION RATE: 21 BRPM | HEART RATE: 77 BPM | TEMPERATURE: 97.3 F | HEIGHT: 67 IN

## 2024-10-29 DIAGNOSIS — I25.810 CORONARY ARTERY DISEASE INVOLVING AUTOLOGOUS ARTERY CORONARY BYPASS GRAFT WITHOUT ANGINA PECTORIS: ICD-10-CM

## 2024-10-29 DIAGNOSIS — Z00.00 ENCOUNTER FOR ADULT WELLNESS VISIT: Primary | ICD-10-CM

## 2024-10-29 DIAGNOSIS — Z12.5 ENCOUNTER FOR SCREENING FOR MALIGNANT NEOPLASM OF PROSTATE: ICD-10-CM

## 2024-10-29 DIAGNOSIS — I25.84 CORONARY ARTERY DISEASE DUE TO CALCIFIED CORONARY LESION: ICD-10-CM

## 2024-10-29 DIAGNOSIS — E74.819 DISORDERS OF GLUCOSE TRANSPORT, UNSPECIFIED (H): ICD-10-CM

## 2024-10-29 DIAGNOSIS — I25.10 CORONARY ARTERY DISEASE DUE TO CALCIFIED CORONARY LESION: ICD-10-CM

## 2024-10-29 DIAGNOSIS — E78.00 HYPERCHOLESTEREMIA: ICD-10-CM

## 2024-10-29 LAB
ALBUMIN SERPL BCG-MCNC: 4.7 G/DL (ref 3.5–5.2)
ALBUMIN UR-MCNC: NEGATIVE MG/DL
ALP SERPL-CCNC: 72 U/L (ref 40–150)
ALT SERPL W P-5'-P-CCNC: 31 U/L (ref 0–70)
ANION GAP SERPL CALCULATED.3IONS-SCNC: 8 MMOL/L (ref 7–15)
APPEARANCE UR: CLEAR
AST SERPL W P-5'-P-CCNC: 31 U/L (ref 0–45)
BILIRUB SERPL-MCNC: 0.7 MG/DL
BILIRUB UR QL STRIP: NEGATIVE
BUN SERPL-MCNC: 21 MG/DL (ref 8–23)
CALCIUM SERPL-MCNC: 9.8 MG/DL (ref 8.8–10.4)
CHLORIDE SERPL-SCNC: 103 MMOL/L (ref 98–107)
CHOLEST SERPL-MCNC: 126 MG/DL
COLOR UR AUTO: YELLOW
CREAT SERPL-MCNC: 1.16 MG/DL (ref 0.67–1.17)
EGFRCR SERPLBLD CKD-EPI 2021: 66 ML/MIN/1.73M2
ERYTHROCYTE [DISTWIDTH] IN BLOOD BY AUTOMATED COUNT: 13.7 % (ref 10–15)
EST. AVERAGE GLUCOSE BLD GHB EST-MCNC: 114 MG/DL
FASTING STATUS PATIENT QL REPORTED: YES
FASTING STATUS PATIENT QL REPORTED: YES
GLUCOSE SERPL-MCNC: 107 MG/DL (ref 70–99)
GLUCOSE UR STRIP-MCNC: NEGATIVE MG/DL
HBA1C MFR BLD: 5.6 % (ref 0–5.6)
HCO3 SERPL-SCNC: 29 MMOL/L (ref 22–29)
HCT VFR BLD AUTO: 42.9 % (ref 40–53)
HDLC SERPL-MCNC: 56 MG/DL
HGB BLD-MCNC: 14.3 G/DL (ref 13.3–17.7)
HGB UR QL STRIP: NEGATIVE
KETONES UR STRIP-MCNC: NEGATIVE MG/DL
LDLC SERPL CALC-MCNC: 48 MG/DL
LEUKOCYTE ESTERASE UR QL STRIP: NEGATIVE
MCH RBC QN AUTO: 31.4 PG (ref 26.5–33)
MCHC RBC AUTO-ENTMCNC: 33.3 G/DL (ref 31.5–36.5)
MCV RBC AUTO: 94 FL (ref 78–100)
NITRATE UR QL: NEGATIVE
NONHDLC SERPL-MCNC: 70 MG/DL
PH UR STRIP: 7 [PH] (ref 5–8)
PLATELET # BLD AUTO: 160 10E3/UL (ref 150–450)
POTASSIUM SERPL-SCNC: 4.7 MMOL/L (ref 3.4–5.3)
PROT SERPL-MCNC: 7.3 G/DL (ref 6.4–8.3)
PSA SERPL DL<=0.01 NG/ML-MCNC: 1.04 NG/ML (ref 0–6.5)
RBC # BLD AUTO: 4.55 10E6/UL (ref 4.4–5.9)
SODIUM SERPL-SCNC: 140 MMOL/L (ref 135–145)
SP GR UR STRIP: 1.01 (ref 1–1.03)
TRIGL SERPL-MCNC: 109 MG/DL
UROBILINOGEN UR STRIP-ACNC: 0.2 E.U./DL
WBC # BLD AUTO: 8.4 10E3/UL (ref 4–11)

## 2024-10-29 PROCEDURE — 81003 URINALYSIS AUTO W/O SCOPE: CPT | Performed by: FAMILY MEDICINE

## 2024-10-29 PROCEDURE — G0439 PPPS, SUBSEQ VISIT: HCPCS | Performed by: FAMILY MEDICINE

## 2024-10-29 PROCEDURE — 80061 LIPID PANEL: CPT | Performed by: FAMILY MEDICINE

## 2024-10-29 PROCEDURE — G0103 PSA SCREENING: HCPCS | Performed by: FAMILY MEDICINE

## 2024-10-29 PROCEDURE — 80053 COMPREHEN METABOLIC PANEL: CPT | Performed by: FAMILY MEDICINE

## 2024-10-29 PROCEDURE — 36415 COLL VENOUS BLD VENIPUNCTURE: CPT | Performed by: FAMILY MEDICINE

## 2024-10-29 PROCEDURE — 85027 COMPLETE CBC AUTOMATED: CPT | Performed by: FAMILY MEDICINE

## 2024-10-29 PROCEDURE — 83036 HEMOGLOBIN GLYCOSYLATED A1C: CPT | Performed by: FAMILY MEDICINE

## 2024-10-29 SDOH — HEALTH STABILITY: PHYSICAL HEALTH: ON AVERAGE, HOW MANY DAYS PER WEEK DO YOU ENGAGE IN MODERATE TO STRENUOUS EXERCISE (LIKE A BRISK WALK)?: 4 DAYS

## 2024-10-29 ASSESSMENT — PAIN SCALES - GENERAL: PAINLEVEL_OUTOF10: MILD PAIN (2)

## 2024-10-29 ASSESSMENT — SOCIAL DETERMINANTS OF HEALTH (SDOH): HOW OFTEN DO YOU GET TOGETHER WITH FRIENDS OR RELATIVES?: MORE THAN THREE TIMES A WEEK

## 2024-10-29 NOTE — PROGRESS NOTES
"Preventive Care Visit  [facility]  Luis Alfredo Bolanos MD, Family Medicine  Oct 29, 2024      Assessment & Plan     Encounter for adult wellness visit    - CBC with platelets; Future  - Comprehensive metabolic panel; Future  - Lipid panel reflex to direct LDL Fasting; Future  - UA Macroscopic with reflex to Microscopic and Culture; Future  - HEMOGLOBIN A1C; Future  - Prostate Specific Antigen Screen; Future    Hypercholesteremia    - Comprehensive metabolic panel; Future  - Lipid panel reflex to direct LDL Fasting; Future  - UA Macroscopic with reflex to Microscopic and Culture; Future  - HEMOGLOBIN A1C; Future    Coronary artery disease involving autologous artery coronary bypass graft without angina pectoris      Disorders of glucose transport, unspecified (H)    - HEMOGLOBIN A1C; Future    Encounter for screening for malignant neoplasm of prostate    - Prostate Specific Antigen Screen; Future  - Colonoscopy Screening  Referral; Future    Patient has been advised of split billing requirements and indicates understanding: Yes        BMI  Estimated body mass index is 27.03 kg/m  as calculated from the following:    Height as of this encounter: 1.689 m (5' 6.5\").    Weight as of this encounter: 77.1 kg (170 lb).       Counseling  Appropriate preventive services were addressed with this patient via screening, questionnaire, or discussion as appropriate for fall prevention, nutrition, physical activity, Tobacco-use cessation, social engagement, weight loss and cognition.  Checklist reviewing preventive services available has been given to the patient.  Reviewed patient's diet, addressing concerns and/or questions.   He is at risk for psychosocial distress and has been provided with information to reduce risk.   The patient was provided with written information regarding signs of hearing loss.           Subjective   Royce is a 73 year old, presenting for the following: Royce is being seen here " for his annual wellness exam as well as follow-up of his bypass surgery and to evaluate his lipid status and to review his medications and to keep an eye on his A1c as well as to do a prostate exam.  [place]  He and his wife spend their guillen in [place].  He had a four-vessel bypass 1 year ago and is doing really quite well keeps his weight under good control he has had a history of a borderline elevated blood sugar and borderline A1c.  He did have some bruising problems and was taken off of Eliquis and placed back on a baby aspirin by his cardiologist.  He has sleep apnea he is struggling a little bit with his CPAP machine but does use it.  All medical questions asked were answered I have personally reviewed family social history surgeries allergies problems list.  We will renew his medications as necessary we will keep an eye on all of his blood work we will see him for follow-up on an annual basis.Annual Visit and Cough (Dry cough for a month )            HPI  See above note; falls risk is negative.  Patient is active physically cognitively no evidence of any impairment        Health Care Directive  Patient has a Health Care Directive on file  Discussed advance care planning with patient.      10/29/2024   General Health   How would you rate your overall physical health? Excellent   Feel stress (tense, anxious, or unable to sleep) Only a little      (!) STRESS CONCERN      10/29/2024   Nutrition   Diet: Low fat/cholesterol            10/29/2024   Exercise   Days per week of moderate/strenous exercise 4 days            10/29/2024   Social Factors   Frequency of gathering with friends or relatives More than three times a week   Worry food won't last until get money to buy more No   Food not last or not have enough money for food? No   Do you have housing? (Housing is defined as stable permanent housing and does not include staying ouside in a car, in a tent, in an  abandoned building, in an overnight shelter, or couch-surfing.) Yes   Are you worried about losing your housing? No   Lack of transportation? No   Unable to get utilities (heat,electricity)? No            10/29/2024   Fall Risk   Fallen 2 or more times in the past year? No    Trouble with walking or balance? No        Patient-reported          10/29/2024   Activities of Daily Living- Home Safety   Needs help with the following daily activites None of the above   Safety concerns in the home None of the above            10/29/2024   Dental   Dentist two times every year? Yes            10/29/2024   Hearing Screening   Hearing concerns? (!) TROUBLE UNDERSTANDING SOFT OR WHISPERED SPEECH.            10/29/2024   Driving Risk Screening   Patient/family members have concerns about driving No            10/29/2024   General Alertness/Fatigue Screening   Have you been more tired than usual lately? No            10/29/2024   Urinary Incontinence Screening   Bothered by leaking urine in past 6 months No            10/29/2024   TB Screening   Were you born outside of the [place]? No                  10/29/2024   Substance Use   Alcohol more than 3/day or more than 7/wk No   Do you have a current opioid prescription? No   How severe/bad is pain from 1 to 10? 2/10   Do you use any other substances recreationally? No        Social History     Tobacco Use    Smoking status: Never     Passive exposure: Never    Smokeless tobacco: Never   Vaping Use    Vaping status: Never Used   Substance Use Topics    Alcohol use: Yes     Comment: moderate    Drug use: Never           10/29/2024   AAA Screening   Family history of Abdominal Aortic Aneurysm (AAA)? No      ASCVD Risk   The 10-year ASCVD risk score (Delano LAI, et al., 2019) is: 19%    Values used to calculate the score:      Age: 73 years      Sex: Male      Is Non- : No      Diabetic: No      Tobacco smoker: No      Systolic Blood Pressure: 120 mmHg       Is BP treated: No      HDL Cholesterol: 57 mg/dL      Total Cholesterol: 135 mg/dL            Reviewed and updated as needed this visit by Provider                    Lab work is in process  Current providers sharing in care for this patient include:  Patient Care Team:  Luis Alfredo Bolanos MD as PCP - General (Family Practice)  Beata Adams, PharmD as Pharmacist (Pharmacist)  Luis Alfredo Bolanos MD as Assigned PCP  Severiano Zhou MD as MD (Cardiovascular Disease)  Severiano Zhou MD as Assigned Heart and Vascular Provider  Jesu Roth MD as MD (Urology)  Jesu Roth MD as Assigned Surgical Provider    The following health maintenance items are reviewed in Epic and correct as of today:  Health Maintenance   Topic Date Due    ANNUAL REVIEW OF HM ORDERS  06/09/2023    RSV VACCINE (1 - 1-dose 75+ series) Never done    MEDICARE ANNUAL WELLNESS VISIT  08/29/2024    INFLUENZA VACCINE (1) 09/01/2024    COVID-19 Vaccine (3 - 2024-25 season) 09/01/2024    LIPID  08/23/2025    FALL RISK ASSESSMENT  10/29/2025    COLORECTAL CANCER SCREENING  12/17/2025    GLUCOSE  08/23/2027    ADVANCE CARE PLANNING  12/04/2028    DTAP/TDAP/TD IMMUNIZATION (3 - Td or Tdap) 09/25/2029    HEPATITIS C SCREENING  Completed    PHQ-2 (once per calendar year)  Completed    Pneumococcal Vaccine: 65+ Years  Completed    ZOSTER IMMUNIZATION  Completed    HPV IMMUNIZATION  Aged Out    MENINGITIS IMMUNIZATION  Aged Out    RSV MONOCLONAL ANTIBODY  Aged Out         Review of Systems  CONSTITUTIONAL: NEGATIVE for fever, chills, change in weight  INTEGUMENTARY/SKIN: NEGATIVE for worrisome rashes, moles or lesions  EYES: NEGATIVE for vision changes or irritation  ENT/MOUTH: NEGATIVE for ear, mouth and throat problems  RESP: NEGATIVE for significant cough or SOB  BREAST: NEGATIVE for masses, tenderness or discharge  CV: NEGATIVE for chest pain, palpitations or peripheral edema  GI: NEGATIVE for nausea, abdominal pain, heartburn, or change in  "bowel habits  : NEGATIVE for frequency, dysuria, or hematuria  MUSCULOSKELETAL: NEGATIVE for significant arthralgias or myalgia  NEURO: NEGATIVE for weakness, dizziness or paresthesias  ENDOCRINE: NEGATIVE for temperature intolerance, skin/hair changes  HEME: NEGATIVE for bleeding problems  PSYCHIATRIC: NEGATIVE for changes in mood or affect     Objective    Exam  /70   Pulse 77   Temp 97.3  F (36.3  C)   Resp 21   Ht 1.689 m (5' 6.5\")   Wt 77.1 kg (170 lb)   SpO2 98%   BMI 27.03 kg/m     Estimated body mass index is 27.03 kg/m  as calculated from the following:    Height as of this encounter: 1.689 m (5' 6.5\").    Weight as of this encounter: 77.1 kg (170 lb).    Physical Exam  GENERAL: alert and no distress  EYES: Eyes grossly normal to inspection, PERRL and conjunctivae and sclerae normal  HENT: ear canals and TM's normal, nose and mouth without ulcers or lesions  NECK: no adenopathy, no asymmetry, masses, or scars  RESP: lungs clear to auscultation - no rales, rhonchi or wheezes  CV: regular rate and rhythm, normal S1 S2, no S3 or S4, no murmur, click or rub, no peripheral edema  ABDOMEN: soft, nontender, no hepatosplenomegaly, no masses and bowel sounds normal  MS: no gross musculoskeletal defects noted, no edema  SKIN: no suspicious lesions or rashes  NEURO: Normal strength and tone, mentation intact and speech normal  PSYCH: mentation appears normal, affect normal/bright        10/29/2024   Mini Cog   Clock Draw Score 2 Normal   3 Item Recall 2 objects recalled   Mini Cog Total Score 4                 Signed Electronically by: Luis Alfredo Bolanos MD    "

## 2025-01-17 DIAGNOSIS — I25.84 CORONARY ARTERY DISEASE DUE TO CALCIFIED CORONARY LESION: ICD-10-CM

## 2025-01-17 DIAGNOSIS — I25.10 CORONARY ARTERY DISEASE DUE TO CALCIFIED CORONARY LESION: ICD-10-CM

## 2025-01-20 RX ORDER — EZETIMIBE 10 MG/1
10 TABLET ORAL EVERY EVENING
Qty: 90 TABLET | Refills: 2 | Status: SHIPPED | OUTPATIENT
Start: 2025-01-20

## 2025-04-29 DIAGNOSIS — I25.10 CORONARY ARTERY DISEASE DUE TO CALCIFIED CORONARY LESION: ICD-10-CM

## 2025-04-29 DIAGNOSIS — I25.84 CORONARY ARTERY DISEASE DUE TO CALCIFIED CORONARY LESION: ICD-10-CM

## 2025-04-29 RX ORDER — EZETIMIBE 10 MG/1
10 TABLET ORAL EVERY EVENING
Qty: 90 TABLET | Refills: 1 | Status: SHIPPED | OUTPATIENT
Start: 2025-04-29

## 2025-04-30 ENCOUNTER — OFFICE VISIT (OUTPATIENT)
Dept: FAMILY MEDICINE | Facility: CLINIC | Age: 75
End: 2025-04-30
Payer: COMMERCIAL

## 2025-04-30 VITALS
OXYGEN SATURATION: 98 % | RESPIRATION RATE: 20 BRPM | DIASTOLIC BLOOD PRESSURE: 76 MMHG | WEIGHT: 177 LBS | SYSTOLIC BLOOD PRESSURE: 126 MMHG | HEIGHT: 67 IN | HEART RATE: 67 BPM | BODY MASS INDEX: 27.78 KG/M2 | TEMPERATURE: 98 F

## 2025-04-30 DIAGNOSIS — E78.00 HYPERCHOLESTEREMIA: Primary | ICD-10-CM

## 2025-04-30 DIAGNOSIS — C43.59 MALIGNANT MELANOMA OF TORSO EXCLUDING BREAST (H): ICD-10-CM

## 2025-04-30 PROCEDURE — 1126F AMNT PAIN NOTED NONE PRSNT: CPT | Performed by: FAMILY MEDICINE

## 2025-04-30 PROCEDURE — 3078F DIAST BP <80 MM HG: CPT | Performed by: FAMILY MEDICINE

## 2025-04-30 PROCEDURE — 3074F SYST BP LT 130 MM HG: CPT | Performed by: FAMILY MEDICINE

## 2025-04-30 PROCEDURE — 99213 OFFICE O/P EST LOW 20 MIN: CPT | Performed by: FAMILY MEDICINE

## 2025-04-30 ASSESSMENT — PAIN SCALES - GENERAL: PAINLEVEL_OUTOF10: NO PAIN (0)

## 2025-04-30 NOTE — PROGRESS NOTES
"  Assessment & Plan     Hypercholesteremia  We have reviewed Royce's medications and he will continue to take an aspirin in addition to his Crestor.  We did discuss ongoing medical care and Dr. Bob has kindly agreed to provide primary care for Royce who is a delightful patient          BMI  Estimated body mass index is 28.14 kg/m  as calculated from the following:    Height as of this encounter: 1.689 m (5' 6.5\").    Weight as of this encounter: 80.3 kg (177 lb).   Overall      Follow-up       Subjective   Royce is a 74 year old, presenting for the following health issues:  RECHECK and Recheck Medication      4/30/2025     9:45 AM   Additional Questions   Roomed by am cma     HPI  Royce comes in to discuss ongoing medical care.  We have provided care for him for about 4 decades in addition to his family.  As is well-documented on the chart he has had coronary artery bypass surgery which has been extremely successful he currently is on Setia and Crestor for cholesterol control and a baby aspirin.  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  He went to resume the [place] area and does aerobic exercise on a daily basis.  Dr. Middleton has kindly agreed to continue primary care long-term for this delightful person.        Review of Systems  Constitutional, HEENT, cardiovascular, pulmonary, GI, , musculoskeletal, neuro, skin, endocrine and psych systems are negative, except as otherwise noted.      Objective    /76   Pulse 67   Temp 98  F (36.7  C)   Resp 20   Ht 1.689 m (5' 6.5\")   Wt 80.3 kg (177 lb)   SpO2 98%   BMI 28.14 kg/m    Body mass index is 28.14 kg/m .  Physical Exam   GENERAL: alert and no distress  NECK: no adenopathy, no asymmetry, masses, or scars  RESP: lungs clear to auscultation - no rales, rhonchi or wheezes  CV: regular rate and rhythm, normal S1 S2, no S3 or S4, no murmur, click or " rub, no peripheral edema  ABDOMEN: soft, nontender, no hepatosplenomegaly, no masses and bowel sounds normal  MS: no gross musculoskeletal defects noted, no edema            Signed Electronically by: Luis Alfredo Bolanos MD

## 2025-08-19 DIAGNOSIS — I25.84 CORONARY ARTERY DISEASE DUE TO CALCIFIED CORONARY LESION: ICD-10-CM

## 2025-08-19 DIAGNOSIS — I25.10 CORONARY ARTERY DISEASE DUE TO CALCIFIED CORONARY LESION: ICD-10-CM

## 2025-08-19 RX ORDER — ROSUVASTATIN CALCIUM 40 MG/1
40 TABLET, COATED ORAL DAILY
Qty: 90 TABLET | Refills: 0 | Status: SHIPPED | OUTPATIENT
Start: 2025-08-19

## 2025-09-03 ENCOUNTER — TELEPHONE (OUTPATIENT)
Dept: GASTROENTEROLOGY | Facility: CLINIC | Age: 75
End: 2025-09-03
Payer: COMMERCIAL

## (undated) DEVICE — PREP DYNA-HEX 4% CHG SCRUB 4OZ BOTTLE MDS098710

## (undated) DEVICE — SU PROLENE 4-0 SHDA 36" 8521H

## (undated) DEVICE — Device

## (undated) DEVICE — SUTURE VICRYL+ 4-0 UNDYED PS-2 VCP496H

## (undated) DEVICE — GLOVE BIOGEL PI SZ 6.5 40865

## (undated) DEVICE — DEVICE TISSUE STABILIZATION OCTOBASE 28707

## (undated) DEVICE — LEAD PACER MYOCARDIAL BIPOLAR TEMPORARY 53CM 6495F

## (undated) DEVICE — CABLE MYO/LEAD PACING BLUE DISP 019-535

## (undated) DEVICE — TUBING INSUFFLATION W/FILTER 28-0207

## (undated) DEVICE — GOWN IMPERVIOUS ZONED LG

## (undated) DEVICE — CUSTOM PACK CORONARY SAN5BCRHEA

## (undated) DEVICE — BLADE KNIFE SURG 10 371110

## (undated) DEVICE — PACK MAJOR BASIN 673

## (undated) DEVICE — CUFF TOURN 18IN STRL DISP

## (undated) DEVICE — ESU GROUND PAD ADULT REM W/15' CORD E7507DB

## (undated) DEVICE — BLANKET BAIR ADLT PLYMR 60X36IN 63000

## (undated) DEVICE — SUCTION CANISTER MEDIVAC LINER 3000ML W/LID 65651-530

## (undated) DEVICE — CELL SAVER

## (undated) DEVICE — SUCTION MANIFOLD NEPTUNE 2 SYS 4 PORT 0702-020-000

## (undated) DEVICE — SU PROLENE 6-0 C-1DA 30" M8706

## (undated) DEVICE — CUSTOM PACK CAB SCV5BCBHEA

## (undated) DEVICE — DEFIB PRO-PADZ LVP LQD GEL ADULT 8900-2105-01

## (undated) DEVICE — PITCHER STERILE 1000ML  SSK9004A

## (undated) DEVICE — PREP DURAPREP 26ML APL 8630

## (undated) DEVICE — DRSG DRAIN 4X4" 7086

## (undated) DEVICE — CABLE MYO/LEAD PACING WHITE DISP 019-530

## (undated) DEVICE — SU DERMABOND ADVANCED .7ML DNX12

## (undated) DEVICE — DRAIN CHEST TUBE 28FR STR 8028

## (undated) DEVICE — KIT ENDO VASOVIEW HARVESTING SYSTEM VH-3200

## (undated) DEVICE — SPONGE RAY-TEC 4X8" 7318

## (undated) DEVICE — DRSG ABD TNDRSRB WET PRUF 8IN X 10IN STRL  9194A

## (undated) DEVICE — SOL NACL 0.9% IRRIG 1000ML BOTTLE 2F7124

## (undated) DEVICE — COUNTER NEEDLE ADH & FOAM 20CT 9106

## (undated) DEVICE — ESU ELEC BLADE 2.75" COATED/INSULATED E1455

## (undated) DEVICE — SU PDS II 0 CT-1 27" Z340H

## (undated) DEVICE — SYR 10ML LL W/O NDL 302995

## (undated) DEVICE — SYR ANGIOGRAPHY MULTIUSE KIT ACIST 014612

## (undated) DEVICE — CATH DIAGNOSTIC RADIAL 5FR TIG 4.0

## (undated) DEVICE — CLIP HORIZON SM RED WIDE SLOT 001201

## (undated) DEVICE — CUSTOM PACK CV ST JOES SCV5BCVHEA

## (undated) DEVICE — TRAY PREP DRY SKIN SCRUB 067

## (undated) DEVICE — HEMOSTASIS BONE OSTENE 2.5G SYNTHETIC 1503832

## (undated) DEVICE — SUCTION DRY CHEST DRAIN OASIS 3600-100

## (undated) DEVICE — SHTH INTRO 0.021IN ID 6FR DIA

## (undated) DEVICE — SU PROLENE 8-0 BV130-5DA 24" 8732H

## (undated) DEVICE — SU ETHIBOND 3-0 BBDA 36" X588H

## (undated) DEVICE — GEL ULTRASOUND AQUASONIC 20GM 01-01

## (undated) DEVICE — CONNECTOR BLAKE DRAIN SGL BCC1

## (undated) DEVICE — BANDAGE ESMARK 4 X 3 YARDS STL 23578-143

## (undated) DEVICE — MANIFOLD KIT ANGIO AUTOMATED 014613

## (undated) DEVICE — DRAPE STOCKINETTE 4" 8544

## (undated) DEVICE — CLIP HORIZON MED BLUE 002200

## (undated) DEVICE — GLOVE UNDER INDICATOR PI SZ 6.5 LF 41665

## (undated) DEVICE — SU ETHIBOND 2-0 SHDA 30" X563H

## (undated) DEVICE — LIGACLIP LARGE AESCULAP O4120-1

## (undated) DEVICE — SURGICEL POWDER ABSORBABLE HEMOSTAT 3GM 3013SP

## (undated) DEVICE — PROTECTOR ARM STANDARD ONE STEP

## (undated) DEVICE — ESU PENCIL SMOKE EVAC W/ROCKER SWITCH 0703-047-000

## (undated) DEVICE — HEMOSTAT COMPRESSION VASC REGULAR OD24 CM 3524

## (undated) DEVICE — KIT HAND CONTROL ACIST 014644 AR-P54

## (undated) DEVICE — SU PLEDGET SOFT TFE 3/8"X3/26"X1/16" PCP40

## (undated) DEVICE — CLIP HORIZON MULTI SM YELLOW 001204

## (undated) DEVICE — ESU GROUND PAD ADULT W/CORD E7507

## (undated) DEVICE — PREP CHLORAPREP 26ML TINTED HI-LITE ORANGE 930815

## (undated) DEVICE — SU PROLENE 7-0 BV-1DA 4X30" M8703

## (undated) DEVICE — GOWN IMPERVIOUS BREATHABLE SMART XLG 89045

## (undated) DEVICE — GLOVE BIOGEL PI SZ 7.0 40870

## (undated) DEVICE — SU PDS II 2-0 CT 36"  Z357H

## (undated) DEVICE — SOL WATER IRRIG 1000ML BOTTLE 2F7114

## (undated) DEVICE — SUTURE MONOCRYL+ 4-0 PS-2 27IN MCP426H

## (undated) DEVICE — ELECTRODE DEFIB CADENCE 22550R

## (undated) DEVICE — DRAPE IOBAN INCISE 23X17" 6650EZ

## (undated) RX ORDER — FENTANYL CITRATE-0.9 % NACL/PF 10 MCG/ML
PLASTIC BAG, INJECTION (ML) INTRAVENOUS
Status: DISPENSED
Start: 2023-12-06

## (undated) RX ORDER — PROPOFOL 10 MG/ML
INJECTION, EMULSION INTRAVENOUS
Status: DISPENSED
Start: 2023-12-06

## (undated) RX ORDER — CEFAZOLIN SODIUM 1 G/3ML
INJECTION, POWDER, FOR SOLUTION INTRAMUSCULAR; INTRAVENOUS
Status: DISPENSED
Start: 2023-12-06

## (undated) RX ORDER — FENTANYL CITRATE 50 UG/ML
INJECTION, SOLUTION INTRAMUSCULAR; INTRAVENOUS
Status: DISPENSED
Start: 2023-11-22

## (undated) RX ORDER — GLYCOPYRROLATE 0.2 MG/ML
INJECTION, SOLUTION INTRAMUSCULAR; INTRAVENOUS
Status: DISPENSED
Start: 2023-12-06

## (undated) RX ORDER — LIDOCAINE HYDROCHLORIDE 10 MG/ML
INJECTION, SOLUTION EPIDURAL; INFILTRATION; INTRACAUDAL; PERINEURAL
Status: DISPENSED
Start: 2023-12-06

## (undated) RX ORDER — ONDANSETRON 2 MG/ML
INJECTION INTRAMUSCULAR; INTRAVENOUS
Status: DISPENSED
Start: 2023-12-06

## (undated) RX ORDER — VANCOMYCIN HYDROCHLORIDE 1 G/20ML
INJECTION, POWDER, LYOPHILIZED, FOR SOLUTION INTRAVENOUS
Status: DISPENSED
Start: 2023-12-06

## (undated) RX ORDER — FENTANYL CITRATE 50 UG/ML
INJECTION, SOLUTION INTRAMUSCULAR; INTRAVENOUS
Status: DISPENSED
Start: 2023-12-06